# Patient Record
Sex: FEMALE | Race: WHITE | Employment: UNEMPLOYED | URBAN - METROPOLITAN AREA
[De-identification: names, ages, dates, MRNs, and addresses within clinical notes are randomized per-mention and may not be internally consistent; named-entity substitution may affect disease eponyms.]

---

## 2019-01-02 ENCOUNTER — HOSPITAL ENCOUNTER (INPATIENT)
Age: 84
LOS: 3 days | Discharge: HOME | DRG: 194 | End: 2019-01-05
Attending: EMERGENCY MEDICINE | Admitting: INTERNAL MEDICINE
Payer: MEDICARE

## 2019-01-02 ENCOUNTER — APPOINTMENT (OUTPATIENT)
Dept: GENERAL RADIOLOGY | Age: 84
DRG: 194 | End: 2019-01-02
Payer: MEDICARE

## 2019-01-02 DIAGNOSIS — R06.89 DYSPNEA AND RESPIRATORY ABNORMALITIES: ICD-10-CM

## 2019-01-02 DIAGNOSIS — J11.1 INFLUENZA WITH RESPIRATORY MANIFESTATION OTHER THAN PNEUMONIA: ICD-10-CM

## 2019-01-02 DIAGNOSIS — R06.00 DYSPNEA AND RESPIRATORY ABNORMALITIES: ICD-10-CM

## 2019-01-02 DIAGNOSIS — J18.9 PNEUMONIA OF BOTH LUNGS DUE TO INFECTIOUS ORGANISM, UNSPECIFIED PART OF LUNG: Primary | ICD-10-CM

## 2019-01-02 DIAGNOSIS — R00.0 TACHYCARDIA: ICD-10-CM

## 2019-01-02 DIAGNOSIS — D72.828 OTHER ELEVATED WHITE BLOOD CELL (WBC) COUNT: ICD-10-CM

## 2019-01-02 LAB
ALBUMIN SERPL-MCNC: 3.6 G/DL (ref 3.9–4.9)
ALP BLD-CCNC: 78 U/L (ref 40–130)
ALT SERPL-CCNC: 15 U/L (ref 0–33)
ANION GAP SERPL CALCULATED.3IONS-SCNC: 19 MEQ/L (ref 7–13)
AST SERPL-CCNC: 19 U/L (ref 0–35)
BILIRUB SERPL-MCNC: 0.5 MG/DL (ref 0–1.2)
BUN BLDV-MCNC: 24 MG/DL (ref 8–23)
CALCIUM SERPL-MCNC: 9.5 MG/DL (ref 8.6–10.2)
CHLORIDE BLD-SCNC: 98 MEQ/L (ref 98–107)
CO2: 24 MEQ/L (ref 22–29)
CREAT SERPL-MCNC: 0.67 MG/DL (ref 0.5–0.9)
EKG ATRIAL RATE: 111 BPM
EKG P AXIS: 19 DEGREES
EKG P-R INTERVAL: 100 MS
EKG Q-T INTERVAL: 340 MS
EKG QRS DURATION: 84 MS
EKG QTC CALCULATION (BAZETT): 462 MS
EKG R AXIS: 17 DEGREES
EKG T AXIS: 71 DEGREES
EKG VENTRICULAR RATE: 111 BPM
GFR AFRICAN AMERICAN: >60
GFR NON-AFRICAN AMERICAN: >60
GLOBULIN: 3.4 G/DL (ref 2.3–3.5)
GLUCOSE BLD-MCNC: 114 MG/DL (ref 74–109)
HCT VFR BLD CALC: 40 % (ref 37–47)
HEMOGLOBIN: 13.3 G/DL (ref 12–16)
LACTIC ACID: 1.7 MMOL/L (ref 0.5–2.2)
MCH RBC QN AUTO: 32.1 PG (ref 27–31.3)
MCHC RBC AUTO-ENTMCNC: 33.3 % (ref 33–37)
MCV RBC AUTO: 96.4 FL (ref 82–100)
PDW BLD-RTO: 13.5 % (ref 11.5–14.5)
PLATELET # BLD: 187 K/UL (ref 130–400)
POTASSIUM SERPL-SCNC: 4.1 MEQ/L (ref 3.5–5.1)
PRO-BNP: 1221 PG/ML
PROCALCITONIN: 1.89 NG/ML (ref 0–0.15)
RAPID INFLUENZA  B AGN: NEGATIVE
RAPID INFLUENZA A AGN: POSITIVE
RBC # BLD: 4.15 M/UL (ref 4.2–5.4)
SODIUM BLD-SCNC: 141 MEQ/L (ref 132–144)
TOTAL PROTEIN: 7 G/DL (ref 6.4–8.1)
WBC # BLD: 16.7 K/UL (ref 4.8–10.8)

## 2019-01-02 PROCEDURE — 6360000002 HC RX W HCPCS: Performed by: EMERGENCY MEDICINE

## 2019-01-02 PROCEDURE — 1210000000 HC MED SURG R&B

## 2019-01-02 PROCEDURE — 2580000003 HC RX 258: Performed by: PHYSICIAN ASSISTANT

## 2019-01-02 PROCEDURE — 71046 X-RAY EXAM CHEST 2 VIEWS: CPT

## 2019-01-02 PROCEDURE — 36415 COLL VENOUS BLD VENIPUNCTURE: CPT

## 2019-01-02 PROCEDURE — 83880 ASSAY OF NATRIURETIC PEPTIDE: CPT

## 2019-01-02 PROCEDURE — 94664 DEMO&/EVAL PT USE INHALER: CPT

## 2019-01-02 PROCEDURE — 80053 COMPREHEN METABOLIC PANEL: CPT

## 2019-01-02 PROCEDURE — 94640 AIRWAY INHALATION TREATMENT: CPT

## 2019-01-02 PROCEDURE — 6370000000 HC RX 637 (ALT 250 FOR IP): Performed by: EMERGENCY MEDICINE

## 2019-01-02 PROCEDURE — 2580000003 HC RX 258: Performed by: EMERGENCY MEDICINE

## 2019-01-02 PROCEDURE — 83605 ASSAY OF LACTIC ACID: CPT

## 2019-01-02 PROCEDURE — 96367 TX/PROPH/DG ADDL SEQ IV INF: CPT

## 2019-01-02 PROCEDURE — 96375 TX/PRO/DX INJ NEW DRUG ADDON: CPT

## 2019-01-02 PROCEDURE — 6370000000 HC RX 637 (ALT 250 FOR IP): Performed by: PHYSICIAN ASSISTANT

## 2019-01-02 PROCEDURE — 96365 THER/PROPH/DIAG IV INF INIT: CPT

## 2019-01-02 PROCEDURE — 87040 BLOOD CULTURE FOR BACTERIA: CPT

## 2019-01-02 PROCEDURE — 93005 ELECTROCARDIOGRAM TRACING: CPT

## 2019-01-02 PROCEDURE — 94760 N-INVAS EAR/PLS OXIMETRY 1: CPT

## 2019-01-02 PROCEDURE — 85027 COMPLETE CBC AUTOMATED: CPT

## 2019-01-02 PROCEDURE — 87804 INFLUENZA ASSAY W/OPTIC: CPT

## 2019-01-02 PROCEDURE — 84145 PROCALCITONIN (PCT): CPT

## 2019-01-02 PROCEDURE — 99285 EMERGENCY DEPT VISIT HI MDM: CPT

## 2019-01-02 PROCEDURE — 6360000002 HC RX W HCPCS: Performed by: PHYSICIAN ASSISTANT

## 2019-01-02 RX ORDER — ALBUTEROL SULFATE 2.5 MG/3ML
2.5 SOLUTION RESPIRATORY (INHALATION)
Status: DISCONTINUED | OUTPATIENT
Start: 2019-01-02 | End: 2019-01-05 | Stop reason: HOSPADM

## 2019-01-02 RX ORDER — METHYLPREDNISOLONE SODIUM SUCCINATE 40 MG/ML
40 INJECTION, POWDER, LYOPHILIZED, FOR SOLUTION INTRAMUSCULAR; INTRAVENOUS EVERY 6 HOURS
Status: DISCONTINUED | OUTPATIENT
Start: 2019-01-02 | End: 2019-01-03

## 2019-01-02 RX ORDER — OSELTAMIVIR PHOSPHATE 75 MG/1
75 CAPSULE ORAL 2 TIMES DAILY
Status: DISCONTINUED | OUTPATIENT
Start: 2019-01-02 | End: 2019-01-05 | Stop reason: HOSPADM

## 2019-01-02 RX ORDER — METHYLPREDNISOLONE SODIUM SUCCINATE 125 MG/2ML
125 INJECTION, POWDER, LYOPHILIZED, FOR SOLUTION INTRAMUSCULAR; INTRAVENOUS ONCE
Status: COMPLETED | OUTPATIENT
Start: 2019-01-02 | End: 2019-01-02

## 2019-01-02 RX ORDER — OSELTAMIVIR PHOSPHATE 75 MG/1
75 CAPSULE ORAL ONCE
Status: COMPLETED | OUTPATIENT
Start: 2019-01-02 | End: 2019-01-02

## 2019-01-02 RX ORDER — SODIUM CHLORIDE 0.9 % (FLUSH) 0.9 %
10 SYRINGE (ML) INJECTION EVERY 12 HOURS SCHEDULED
Status: DISCONTINUED | OUTPATIENT
Start: 2019-01-02 | End: 2019-01-05 | Stop reason: HOSPADM

## 2019-01-02 RX ORDER — ONDANSETRON 2 MG/ML
4 INJECTION INTRAMUSCULAR; INTRAVENOUS EVERY 6 HOURS PRN
Status: DISCONTINUED | OUTPATIENT
Start: 2019-01-02 | End: 2019-01-05 | Stop reason: HOSPADM

## 2019-01-02 RX ORDER — IPRATROPIUM BROMIDE AND ALBUTEROL SULFATE 2.5; .5 MG/3ML; MG/3ML
1 SOLUTION RESPIRATORY (INHALATION) 4 TIMES DAILY
Status: DISCONTINUED | OUTPATIENT
Start: 2019-01-03 | End: 2019-01-05

## 2019-01-02 RX ORDER — IPRATROPIUM BROMIDE AND ALBUTEROL SULFATE 2.5; .5 MG/3ML; MG/3ML
1 SOLUTION RESPIRATORY (INHALATION)
Status: DISCONTINUED | OUTPATIENT
Start: 2019-01-02 | End: 2019-01-02

## 2019-01-02 RX ORDER — SODIUM CHLORIDE 9 MG/ML
INJECTION, SOLUTION INTRAVENOUS CONTINUOUS
Status: DISCONTINUED | OUTPATIENT
Start: 2019-01-02 | End: 2019-01-05 | Stop reason: HOSPADM

## 2019-01-02 RX ORDER — ALBUTEROL SULFATE 2.5 MG/3ML
2.5 SOLUTION RESPIRATORY (INHALATION) EVERY 4 HOURS PRN
COMMUNITY

## 2019-01-02 RX ORDER — ALBUTEROL SULFATE 2.5 MG/3ML
2.5 SOLUTION RESPIRATORY (INHALATION) EVERY 6 HOURS PRN
Status: DISCONTINUED | OUTPATIENT
Start: 2019-01-02 | End: 2019-01-02

## 2019-01-02 RX ORDER — SODIUM CHLORIDE 0.9 % (FLUSH) 0.9 %
10 SYRINGE (ML) INJECTION PRN
Status: DISCONTINUED | OUTPATIENT
Start: 2019-01-02 | End: 2019-01-05 | Stop reason: HOSPADM

## 2019-01-02 RX ADMIN — ENOXAPARIN SODIUM 40 MG: 40 INJECTION SUBCUTANEOUS at 19:50

## 2019-01-02 RX ADMIN — CEFTRIAXONE SODIUM 1 G: 1 INJECTION, POWDER, FOR SOLUTION INTRAMUSCULAR; INTRAVENOUS at 14:34

## 2019-01-02 RX ADMIN — SODIUM CHLORIDE: 9 INJECTION, SOLUTION INTRAVENOUS at 19:49

## 2019-01-02 RX ADMIN — OSELTAMIVIR PHOSPHATE 75 MG: 75 CAPSULE ORAL at 16:13

## 2019-01-02 RX ADMIN — METHYLPREDNISOLONE SODIUM SUCCINATE 40 MG: 40 INJECTION, POWDER, FOR SOLUTION INTRAMUSCULAR; INTRAVENOUS at 19:49

## 2019-01-02 RX ADMIN — AZITHROMYCIN MONOHYDRATE 500 MG: 500 INJECTION, POWDER, LYOPHILIZED, FOR SOLUTION INTRAVENOUS at 15:23

## 2019-01-02 RX ADMIN — OSELTAMIVIR PHOSPHATE 75 MG: 75 CAPSULE ORAL at 21:10

## 2019-01-02 RX ADMIN — IPRATROPIUM BROMIDE AND ALBUTEROL SULFATE 1 AMPULE: .5; 3 SOLUTION RESPIRATORY (INHALATION) at 18:03

## 2019-01-02 RX ADMIN — Medication 10 ML: at 19:50

## 2019-01-02 RX ADMIN — IPRATROPIUM BROMIDE AND ALBUTEROL SULFATE 1 AMPULE: .5; 3 SOLUTION RESPIRATORY (INHALATION) at 14:03

## 2019-01-02 RX ADMIN — METHYLPREDNISOLONE SODIUM SUCCINATE 125 MG: 125 INJECTION, POWDER, FOR SOLUTION INTRAMUSCULAR; INTRAVENOUS at 14:34

## 2019-01-02 ASSESSMENT — ENCOUNTER SYMPTOMS
RHINORRHEA: 0
CHEST TIGHTNESS: 1
ABDOMINAL DISTENTION: 0
WHEEZING: 1
FACIAL SWELLING: 0
SHORTNESS OF BREATH: 1
PHOTOPHOBIA: 0
COLOR CHANGE: 0
COUGH: 1
VOMITING: 0
ABDOMINAL PAIN: 0
EYE DISCHARGE: 0

## 2019-01-02 ASSESSMENT — PAIN SCALES - GENERAL: PAINLEVEL_OUTOF10: 6

## 2019-01-02 ASSESSMENT — PAIN DESCRIPTION - LOCATION: LOCATION: CHEST

## 2019-01-03 ENCOUNTER — APPOINTMENT (OUTPATIENT)
Dept: ULTRASOUND IMAGING | Age: 84
DRG: 194 | End: 2019-01-03
Payer: MEDICARE

## 2019-01-03 LAB
ANION GAP SERPL CALCULATED.3IONS-SCNC: 12 MEQ/L (ref 7–13)
BASOPHILS ABSOLUTE: 0 K/UL (ref 0–0.2)
BASOPHILS RELATIVE PERCENT: 0.1 %
BUN BLDV-MCNC: 21 MG/DL (ref 8–23)
CALCIUM SERPL-MCNC: 9 MG/DL (ref 8.6–10.2)
CHLORIDE BLD-SCNC: 102 MEQ/L (ref 98–107)
CO2: 28 MEQ/L (ref 22–29)
CREAT SERPL-MCNC: 0.47 MG/DL (ref 0.5–0.9)
D DIMER: 1.66 MG/L FEU (ref 0–0.5)
EOSINOPHILS ABSOLUTE: 0 K/UL (ref 0–0.7)
EOSINOPHILS RELATIVE PERCENT: 0 %
GFR AFRICAN AMERICAN: >60
GFR NON-AFRICAN AMERICAN: >60
GLUCOSE BLD-MCNC: 132 MG/DL (ref 74–109)
HCT VFR BLD CALC: 36.9 % (ref 37–47)
HEMOGLOBIN: 12 G/DL (ref 12–16)
LYMPHOCYTES ABSOLUTE: 0.2 K/UL (ref 1–4.8)
LYMPHOCYTES RELATIVE PERCENT: 1.7 %
MCH RBC QN AUTO: 31.5 PG (ref 27–31.3)
MCHC RBC AUTO-ENTMCNC: 32.7 % (ref 33–37)
MCV RBC AUTO: 96.5 FL (ref 82–100)
MONOCYTES ABSOLUTE: 0.3 K/UL (ref 0.2–0.8)
MONOCYTES RELATIVE PERCENT: 2.2 %
NEUTROPHILS ABSOLUTE: 12.2 K/UL (ref 1.4–6.5)
NEUTROPHILS RELATIVE PERCENT: 96 %
PDW BLD-RTO: 13.4 % (ref 11.5–14.5)
PLATELET # BLD: 201 K/UL (ref 130–400)
POTASSIUM REFLEX MAGNESIUM: 3.7 MEQ/L (ref 3.5–5.1)
RBC # BLD: 3.82 M/UL (ref 4.2–5.4)
SODIUM BLD-SCNC: 142 MEQ/L (ref 132–144)
WBC # BLD: 12.6 K/UL (ref 4.8–10.8)

## 2019-01-03 PROCEDURE — 94640 AIRWAY INHALATION TREATMENT: CPT

## 2019-01-03 PROCEDURE — 85379 FIBRIN DEGRADATION QUANT: CPT

## 2019-01-03 PROCEDURE — 36415 COLL VENOUS BLD VENIPUNCTURE: CPT

## 2019-01-03 PROCEDURE — 2700000000 HC OXYGEN THERAPY PER DAY

## 2019-01-03 PROCEDURE — G8979 MOBILITY GOAL STATUS: HCPCS

## 2019-01-03 PROCEDURE — 80048 BASIC METABOLIC PNL TOTAL CA: CPT

## 2019-01-03 PROCEDURE — G8987 SELF CARE CURRENT STATUS: HCPCS

## 2019-01-03 PROCEDURE — G8978 MOBILITY CURRENT STATUS: HCPCS

## 2019-01-03 PROCEDURE — 6360000002 HC RX W HCPCS: Performed by: INTERNAL MEDICINE

## 2019-01-03 PROCEDURE — 6360000002 HC RX W HCPCS: Performed by: PHYSICIAN ASSISTANT

## 2019-01-03 PROCEDURE — 6370000000 HC RX 637 (ALT 250 FOR IP): Performed by: PHYSICIAN ASSISTANT

## 2019-01-03 PROCEDURE — 97166 OT EVAL MOD COMPLEX 45 MIN: CPT

## 2019-01-03 PROCEDURE — 93970 EXTREMITY STUDY: CPT

## 2019-01-03 PROCEDURE — G8988 SELF CARE GOAL STATUS: HCPCS

## 2019-01-03 PROCEDURE — 2580000003 HC RX 258: Performed by: PHYSICIAN ASSISTANT

## 2019-01-03 PROCEDURE — 6370000000 HC RX 637 (ALT 250 FOR IP): Performed by: INTERNAL MEDICINE

## 2019-01-03 PROCEDURE — 1210000000 HC MED SURG R&B

## 2019-01-03 PROCEDURE — 85025 COMPLETE CBC W/AUTO DIFF WBC: CPT

## 2019-01-03 PROCEDURE — 97162 PT EVAL MOD COMPLEX 30 MIN: CPT

## 2019-01-03 RX ORDER — METHYLPREDNISOLONE SODIUM SUCCINATE 40 MG/ML
40 INJECTION, POWDER, LYOPHILIZED, FOR SOLUTION INTRAMUSCULAR; INTRAVENOUS EVERY 8 HOURS
Status: DISCONTINUED | OUTPATIENT
Start: 2019-01-03 | End: 2019-01-05 | Stop reason: HOSPADM

## 2019-01-03 RX ORDER — ACETAMINOPHEN 325 MG/1
650 TABLET ORAL EVERY 4 HOURS PRN
Status: DISCONTINUED | OUTPATIENT
Start: 2019-01-03 | End: 2019-01-05 | Stop reason: HOSPADM

## 2019-01-03 RX ADMIN — IPRATROPIUM BROMIDE AND ALBUTEROL SULFATE 1 AMPULE: .5; 3 SOLUTION RESPIRATORY (INHALATION) at 11:16

## 2019-01-03 RX ADMIN — OSELTAMIVIR PHOSPHATE 75 MG: 75 CAPSULE ORAL at 20:36

## 2019-01-03 RX ADMIN — METHYLPREDNISOLONE SODIUM SUCCINATE 40 MG: 40 INJECTION, POWDER, FOR SOLUTION INTRAMUSCULAR; INTRAVENOUS at 19:38

## 2019-01-03 RX ADMIN — METHYLPREDNISOLONE SODIUM SUCCINATE 40 MG: 40 INJECTION, POWDER, FOR SOLUTION INTRAMUSCULAR; INTRAVENOUS at 02:49

## 2019-01-03 RX ADMIN — IPRATROPIUM BROMIDE AND ALBUTEROL SULFATE 1 AMPULE: .5; 3 SOLUTION RESPIRATORY (INHALATION) at 15:12

## 2019-01-03 RX ADMIN — AZITHROMYCIN MONOHYDRATE 500 MG: 500 INJECTION, POWDER, LYOPHILIZED, FOR SOLUTION INTRAVENOUS at 15:30

## 2019-01-03 RX ADMIN — IPRATROPIUM BROMIDE AND ALBUTEROL SULFATE 1 AMPULE: .5; 3 SOLUTION RESPIRATORY (INHALATION) at 20:59

## 2019-01-03 RX ADMIN — Medication 10 ML: at 19:38

## 2019-01-03 RX ADMIN — CEFTRIAXONE SODIUM 1 G: 1 INJECTION, POWDER, FOR SOLUTION INTRAMUSCULAR; INTRAVENOUS at 17:20

## 2019-01-03 RX ADMIN — SODIUM CHLORIDE: 9 INJECTION, SOLUTION INTRAVENOUS at 13:13

## 2019-01-03 RX ADMIN — ACETAMINOPHEN 650 MG: 325 TABLET ORAL at 15:33

## 2019-01-03 RX ADMIN — OSELTAMIVIR PHOSPHATE 75 MG: 75 CAPSULE ORAL at 10:57

## 2019-01-03 RX ADMIN — METHYLPREDNISOLONE SODIUM SUCCINATE 40 MG: 40 INJECTION, POWDER, FOR SOLUTION INTRAMUSCULAR; INTRAVENOUS at 10:57

## 2019-01-03 RX ADMIN — IPRATROPIUM BROMIDE AND ALBUTEROL SULFATE 1 AMPULE: .5; 3 SOLUTION RESPIRATORY (INHALATION) at 07:19

## 2019-01-03 RX ADMIN — ENOXAPARIN SODIUM 40 MG: 40 INJECTION SUBCUTANEOUS at 19:38

## 2019-01-03 ASSESSMENT — PAIN SCALES - GENERAL
PAINLEVEL_OUTOF10: 0
PAINLEVEL_OUTOF10: 8
PAINLEVEL_OUTOF10: 0

## 2019-01-04 LAB
ANION GAP SERPL CALCULATED.3IONS-SCNC: 12 MEQ/L (ref 7–13)
BASOPHILS ABSOLUTE: 0 K/UL (ref 0–0.2)
BASOPHILS RELATIVE PERCENT: 0.2 %
BUN BLDV-MCNC: 22 MG/DL (ref 8–23)
CALCIUM SERPL-MCNC: 8.6 MG/DL (ref 8.6–10.2)
CHLORIDE BLD-SCNC: 105 MEQ/L (ref 98–107)
CO2: 29 MEQ/L (ref 22–29)
CREAT SERPL-MCNC: 0.49 MG/DL (ref 0.5–0.9)
EOSINOPHILS ABSOLUTE: 0 K/UL (ref 0–0.7)
EOSINOPHILS RELATIVE PERCENT: 0 %
GFR AFRICAN AMERICAN: >60
GFR NON-AFRICAN AMERICAN: >60
GLUCOSE BLD-MCNC: 141 MG/DL (ref 74–109)
HCT VFR BLD CALC: 35.8 % (ref 37–47)
HEMOGLOBIN: 12.1 G/DL (ref 12–16)
LYMPHOCYTES ABSOLUTE: 0.3 K/UL (ref 1–4.8)
LYMPHOCYTES RELATIVE PERCENT: 3.4 %
MCH RBC QN AUTO: 32.7 PG (ref 27–31.3)
MCHC RBC AUTO-ENTMCNC: 33.8 % (ref 33–37)
MCV RBC AUTO: 96.7 FL (ref 82–100)
MONOCYTES ABSOLUTE: 0.4 K/UL (ref 0.2–0.8)
MONOCYTES RELATIVE PERCENT: 4 %
NEUTROPHILS ABSOLUTE: 8.9 K/UL (ref 1.4–6.5)
NEUTROPHILS RELATIVE PERCENT: 92.4 %
PDW BLD-RTO: 13.2 % (ref 11.5–14.5)
PLATELET # BLD: 240 K/UL (ref 130–400)
POTASSIUM REFLEX MAGNESIUM: 4.1 MEQ/L (ref 3.5–5.1)
RBC # BLD: 3.7 M/UL (ref 4.2–5.4)
SODIUM BLD-SCNC: 146 MEQ/L (ref 132–144)
WBC # BLD: 9.7 K/UL (ref 4.8–10.8)

## 2019-01-04 PROCEDURE — 85025 COMPLETE CBC W/AUTO DIFF WBC: CPT

## 2019-01-04 PROCEDURE — 97535 SELF CARE MNGMENT TRAINING: CPT

## 2019-01-04 PROCEDURE — 6370000000 HC RX 637 (ALT 250 FOR IP): Performed by: INTERNAL MEDICINE

## 2019-01-04 PROCEDURE — 36415 COLL VENOUS BLD VENIPUNCTURE: CPT

## 2019-01-04 PROCEDURE — 93010 ELECTROCARDIOGRAM REPORT: CPT | Performed by: INTERNAL MEDICINE

## 2019-01-04 PROCEDURE — 2700000000 HC OXYGEN THERAPY PER DAY

## 2019-01-04 PROCEDURE — 6370000000 HC RX 637 (ALT 250 FOR IP): Performed by: PHYSICIAN ASSISTANT

## 2019-01-04 PROCEDURE — 6360000002 HC RX W HCPCS: Performed by: PHYSICIAN ASSISTANT

## 2019-01-04 PROCEDURE — 94760 N-INVAS EAR/PLS OXIMETRY 1: CPT

## 2019-01-04 PROCEDURE — 6360000002 HC RX W HCPCS: Performed by: INTERNAL MEDICINE

## 2019-01-04 PROCEDURE — 1210000000 HC MED SURG R&B

## 2019-01-04 PROCEDURE — 94640 AIRWAY INHALATION TREATMENT: CPT

## 2019-01-04 PROCEDURE — 80048 BASIC METABOLIC PNL TOTAL CA: CPT

## 2019-01-04 PROCEDURE — 2580000003 HC RX 258: Performed by: PHYSICIAN ASSISTANT

## 2019-01-04 RX ORDER — BUDESONIDE AND FORMOTEROL FUMARATE DIHYDRATE 80; 4.5 UG/1; UG/1
2 AEROSOL RESPIRATORY (INHALATION) 2 TIMES DAILY
Status: DISCONTINUED | OUTPATIENT
Start: 2019-01-04 | End: 2019-01-04 | Stop reason: CLARIF

## 2019-01-04 RX ADMIN — Medication 10 ML: at 09:33

## 2019-01-04 RX ADMIN — METHYLPREDNISOLONE SODIUM SUCCINATE 40 MG: 40 INJECTION, POWDER, FOR SOLUTION INTRAMUSCULAR; INTRAVENOUS at 14:10

## 2019-01-04 RX ADMIN — OSELTAMIVIR PHOSPHATE 75 MG: 75 CAPSULE ORAL at 09:32

## 2019-01-04 RX ADMIN — OSELTAMIVIR PHOSPHATE 75 MG: 75 CAPSULE ORAL at 21:58

## 2019-01-04 RX ADMIN — Medication 2 PUFF: at 19:01

## 2019-01-04 RX ADMIN — SODIUM CHLORIDE: 9 INJECTION, SOLUTION INTRAVENOUS at 18:10

## 2019-01-04 RX ADMIN — AZITHROMYCIN MONOHYDRATE 500 MG: 500 INJECTION, POWDER, LYOPHILIZED, FOR SOLUTION INTRAVENOUS at 18:54

## 2019-01-04 RX ADMIN — ENOXAPARIN SODIUM 40 MG: 40 INJECTION SUBCUTANEOUS at 09:32

## 2019-01-04 RX ADMIN — CEFTRIAXONE SODIUM 1 G: 1 INJECTION, POWDER, FOR SOLUTION INTRAMUSCULAR; INTRAVENOUS at 18:09

## 2019-01-04 RX ADMIN — METHYLPREDNISOLONE SODIUM SUCCINATE 40 MG: 40 INJECTION, POWDER, FOR SOLUTION INTRAMUSCULAR; INTRAVENOUS at 18:10

## 2019-01-04 RX ADMIN — METHYLPREDNISOLONE SODIUM SUCCINATE 40 MG: 40 INJECTION, POWDER, FOR SOLUTION INTRAMUSCULAR; INTRAVENOUS at 02:56

## 2019-01-04 RX ADMIN — SODIUM CHLORIDE: 9 INJECTION, SOLUTION INTRAVENOUS at 02:56

## 2019-01-04 RX ADMIN — IPRATROPIUM BROMIDE AND ALBUTEROL SULFATE 1 AMPULE: .5; 3 SOLUTION RESPIRATORY (INHALATION) at 19:01

## 2019-01-04 RX ADMIN — IPRATROPIUM BROMIDE AND ALBUTEROL SULFATE 1 AMPULE: .5; 3 SOLUTION RESPIRATORY (INHALATION) at 07:21

## 2019-01-04 RX ADMIN — IPRATROPIUM BROMIDE AND ALBUTEROL SULFATE 1 AMPULE: .5; 3 SOLUTION RESPIRATORY (INHALATION) at 11:45

## 2019-01-04 ASSESSMENT — PAIN SCALES - GENERAL: PAINLEVEL_OUTOF10: 0

## 2019-01-05 VITALS
WEIGHT: 205.69 LBS | RESPIRATION RATE: 18 BRPM | TEMPERATURE: 97.9 F | DIASTOLIC BLOOD PRESSURE: 86 MMHG | BODY MASS INDEX: 34.27 KG/M2 | SYSTOLIC BLOOD PRESSURE: 156 MMHG | OXYGEN SATURATION: 96 % | HEART RATE: 107 BPM | HEIGHT: 65 IN

## 2019-01-05 LAB
ANION GAP SERPL CALCULATED.3IONS-SCNC: 8 MEQ/L (ref 7–13)
BASOPHILS ABSOLUTE: 0 K/UL (ref 0–0.2)
BASOPHILS RELATIVE PERCENT: 0.1 %
BUN BLDV-MCNC: 21 MG/DL (ref 8–23)
CALCIUM SERPL-MCNC: 8.5 MG/DL (ref 8.6–10.2)
CHLORIDE BLD-SCNC: 107 MEQ/L (ref 98–107)
CO2: 31 MEQ/L (ref 22–29)
CREAT SERPL-MCNC: 0.39 MG/DL (ref 0.5–0.9)
EOSINOPHILS ABSOLUTE: 0 K/UL (ref 0–0.7)
EOSINOPHILS RELATIVE PERCENT: 0.4 %
GFR AFRICAN AMERICAN: >60
GFR NON-AFRICAN AMERICAN: >60
GLUCOSE BLD-MCNC: 129 MG/DL (ref 74–109)
HCT VFR BLD CALC: 35.7 % (ref 37–47)
HEMOGLOBIN: 11.7 G/DL (ref 12–16)
LYMPHOCYTES ABSOLUTE: 0.3 K/UL (ref 1–4.8)
LYMPHOCYTES RELATIVE PERCENT: 4.3 %
MCH RBC QN AUTO: 31.7 PG (ref 27–31.3)
MCHC RBC AUTO-ENTMCNC: 32.7 % (ref 33–37)
MCV RBC AUTO: 96.8 FL (ref 82–100)
MONOCYTES ABSOLUTE: 0.5 K/UL (ref 0.2–0.8)
MONOCYTES RELATIVE PERCENT: 6.8 %
NEUTROPHILS ABSOLUTE: 5.8 K/UL (ref 1.4–6.5)
NEUTROPHILS RELATIVE PERCENT: 88.4 %
PDW BLD-RTO: 13.4 % (ref 11.5–14.5)
PLATELET # BLD: 242 K/UL (ref 130–400)
POTASSIUM REFLEX MAGNESIUM: 4.3 MEQ/L (ref 3.5–5.1)
RBC # BLD: 3.69 M/UL (ref 4.2–5.4)
SODIUM BLD-SCNC: 146 MEQ/L (ref 132–144)
WBC # BLD: 6.6 K/UL (ref 4.8–10.8)

## 2019-01-05 PROCEDURE — G0008 ADMIN INFLUENZA VIRUS VAC: HCPCS | Performed by: INTERNAL MEDICINE

## 2019-01-05 PROCEDURE — 2700000000 HC OXYGEN THERAPY PER DAY

## 2019-01-05 PROCEDURE — 36415 COLL VENOUS BLD VENIPUNCTURE: CPT

## 2019-01-05 PROCEDURE — 94640 AIRWAY INHALATION TREATMENT: CPT

## 2019-01-05 PROCEDURE — 6360000002 HC RX W HCPCS: Performed by: INTERNAL MEDICINE

## 2019-01-05 PROCEDURE — 85025 COMPLETE CBC W/AUTO DIFF WBC: CPT

## 2019-01-05 PROCEDURE — 90686 IIV4 VACC NO PRSV 0.5 ML IM: CPT | Performed by: INTERNAL MEDICINE

## 2019-01-05 PROCEDURE — 94664 DEMO&/EVAL PT USE INHALER: CPT

## 2019-01-05 PROCEDURE — 6370000000 HC RX 637 (ALT 250 FOR IP): Performed by: PHYSICIAN ASSISTANT

## 2019-01-05 PROCEDURE — 6360000002 HC RX W HCPCS: Performed by: PHYSICIAN ASSISTANT

## 2019-01-05 PROCEDURE — 90670 PCV13 VACCINE IM: CPT | Performed by: INTERNAL MEDICINE

## 2019-01-05 PROCEDURE — 2580000003 HC RX 258: Performed by: PHYSICIAN ASSISTANT

## 2019-01-05 PROCEDURE — 80048 BASIC METABOLIC PNL TOTAL CA: CPT

## 2019-01-05 PROCEDURE — G0009 ADMIN PNEUMOCOCCAL VACCINE: HCPCS | Performed by: INTERNAL MEDICINE

## 2019-01-05 PROCEDURE — 6370000000 HC RX 637 (ALT 250 FOR IP): Performed by: INTERNAL MEDICINE

## 2019-01-05 RX ORDER — IPRATROPIUM BROMIDE AND ALBUTEROL SULFATE 2.5; .5 MG/3ML; MG/3ML
1 SOLUTION RESPIRATORY (INHALATION) 3 TIMES DAILY
Status: DISCONTINUED | OUTPATIENT
Start: 2019-01-05 | End: 2019-01-05 | Stop reason: HOSPADM

## 2019-01-05 RX ORDER — PREDNISONE 20 MG/1
40 TABLET ORAL DAILY
Qty: 6 TABLET | Refills: 0 | Status: SHIPPED | OUTPATIENT
Start: 2019-01-05 | End: 2019-01-08

## 2019-01-05 RX ORDER — OSELTAMIVIR PHOSPHATE 75 MG/1
75 CAPSULE ORAL 2 TIMES DAILY
Qty: 4 CAPSULE | Refills: 0 | Status: SHIPPED | OUTPATIENT
Start: 2019-01-05 | End: 2019-01-07

## 2019-01-05 RX ORDER — LEVOFLOXACIN 500 MG/1
500 TABLET, FILM COATED ORAL DAILY
Qty: 7 TABLET | Refills: 0 | Status: SHIPPED | OUTPATIENT
Start: 2019-01-05 | End: 2019-01-12

## 2019-01-05 RX ADMIN — PNEUMOCOCCAL 13-VALENT CONJUGATE VACCINE 0.5 ML: 2.2; 2.2; 2.2; 2.2; 2.2; 4.4; 2.2; 2.2; 2.2; 2.2; 2.2; 2.2; 2.2 INJECTION, SUSPENSION INTRAMUSCULAR at 08:02

## 2019-01-05 RX ADMIN — INFLUENZA A VIRUS A/MICHIGAN/45/2015 X-275 (H1N1) ANTIGEN (FORMALDEHYDE INACTIVATED), INFLUENZA A VIRUS A/SINGAPORE/INFIMH-16-0019/2016 IVR-186 (H3N2) ANTIGEN (FORMALDEHYDE INACTIVATED), INFLUENZA B VIRUS B/PHUKET/3073/2013 ANTIGEN (FORMALDEHYDE INACTIVATED), AND INFLUENZA B VIRUS B/MARYLAND/15/2016 BX-69A ANTIGEN (FORMALDEHYDE INACTIVATED) 0.5 ML: 15; 15; 15; 15 INJECTION, SUSPENSION INTRAMUSCULAR at 08:00

## 2019-01-05 RX ADMIN — METHYLPREDNISOLONE SODIUM SUCCINATE 40 MG: 40 INJECTION, POWDER, FOR SOLUTION INTRAMUSCULAR; INTRAVENOUS at 03:10

## 2019-01-05 RX ADMIN — METHYLPREDNISOLONE SODIUM SUCCINATE 40 MG: 40 INJECTION, POWDER, FOR SOLUTION INTRAMUSCULAR; INTRAVENOUS at 10:28

## 2019-01-05 RX ADMIN — Medication 2 PUFF: at 08:33

## 2019-01-05 RX ADMIN — OSELTAMIVIR PHOSPHATE 75 MG: 75 CAPSULE ORAL at 07:59

## 2019-01-05 RX ADMIN — IPRATROPIUM BROMIDE AND ALBUTEROL SULFATE 1 AMPULE: .5; 3 SOLUTION RESPIRATORY (INHALATION) at 08:33

## 2019-01-05 RX ADMIN — Medication 10 ML: at 08:06

## 2019-01-05 RX ADMIN — ENOXAPARIN SODIUM 40 MG: 40 INJECTION SUBCUTANEOUS at 08:00

## 2019-01-07 LAB
BLOOD CULTURE, ROUTINE: NORMAL
CULTURE, BLOOD 2: NORMAL

## 2019-07-31 ENCOUNTER — APPOINTMENT (OUTPATIENT)
Dept: CT IMAGING | Age: 84
End: 2019-07-31
Payer: MEDICARE

## 2019-07-31 ENCOUNTER — APPOINTMENT (OUTPATIENT)
Dept: GENERAL RADIOLOGY | Age: 84
End: 2019-07-31
Payer: MEDICARE

## 2019-07-31 ENCOUNTER — HOSPITAL ENCOUNTER (EMERGENCY)
Age: 84
Discharge: HOME | End: 2019-07-31
Attending: EMERGENCY MEDICINE
Payer: MEDICARE

## 2019-07-31 VITALS
DIASTOLIC BLOOD PRESSURE: 55 MMHG | WEIGHT: 206 LBS | SYSTOLIC BLOOD PRESSURE: 134 MMHG | RESPIRATION RATE: 18 BRPM | TEMPERATURE: 98 F | HEIGHT: 65 IN | HEART RATE: 99 BPM | BODY MASS INDEX: 34.32 KG/M2 | OXYGEN SATURATION: 98 %

## 2019-07-31 DIAGNOSIS — S32.000A LUMBAR COMPRESSION FRACTURE, CLOSED, INITIAL ENCOUNTER (HCC): Primary | ICD-10-CM

## 2019-07-31 LAB
ALBUMIN SERPL-MCNC: 4 G/DL (ref 3.5–4.6)
ALP BLD-CCNC: 63 U/L (ref 40–130)
ALT SERPL-CCNC: 13 U/L (ref 0–33)
ANION GAP SERPL CALCULATED.3IONS-SCNC: 14 MEQ/L (ref 9–15)
AST SERPL-CCNC: 22 U/L (ref 0–35)
BASOPHILS ABSOLUTE: 0 K/UL (ref 0–0.2)
BASOPHILS RELATIVE PERCENT: 0.2 %
BILIRUB SERPL-MCNC: 0.6 MG/DL (ref 0.2–0.7)
BUN BLDV-MCNC: 14 MG/DL (ref 8–23)
CALCIUM SERPL-MCNC: 9.5 MG/DL (ref 8.5–9.9)
CHLORIDE BLD-SCNC: 106 MEQ/L (ref 95–107)
CO2: 27 MEQ/L (ref 20–31)
CREAT SERPL-MCNC: 0.73 MG/DL (ref 0.5–0.9)
EKG ATRIAL RATE: 115 BPM
EKG P AXIS: 55 DEGREES
EKG P-R INTERVAL: 124 MS
EKG Q-T INTERVAL: 310 MS
EKG QRS DURATION: 72 MS
EKG QTC CALCULATION (BAZETT): 428 MS
EKG R AXIS: 27 DEGREES
EKG T AXIS: 59 DEGREES
EKG VENTRICULAR RATE: 115 BPM
EOSINOPHILS ABSOLUTE: 0.1 K/UL (ref 0–0.7)
EOSINOPHILS RELATIVE PERCENT: 1.2 %
GFR AFRICAN AMERICAN: >60
GFR NON-AFRICAN AMERICAN: >60
GLOBULIN: 3.1 G/DL (ref 2.3–3.5)
GLUCOSE BLD-MCNC: 119 MG/DL (ref 70–99)
HCT VFR BLD CALC: 39.5 % (ref 37–47)
HEMOGLOBIN: 13.4 G/DL (ref 12–16)
INR BLD: 1
LYMPHOCYTES ABSOLUTE: 0.4 K/UL (ref 1–4.8)
LYMPHOCYTES RELATIVE PERCENT: 6.2 %
MCH RBC QN AUTO: 34 PG (ref 27–31.3)
MCHC RBC AUTO-ENTMCNC: 33.8 % (ref 33–37)
MCV RBC AUTO: 100.4 FL (ref 82–100)
MONOCYTES ABSOLUTE: 0.7 K/UL (ref 0.2–0.8)
MONOCYTES RELATIVE PERCENT: 11.5 %
NEUTROPHILS ABSOLUTE: 5.1 K/UL (ref 1.4–6.5)
NEUTROPHILS RELATIVE PERCENT: 80.9 %
PDW BLD-RTO: 14.5 % (ref 11.5–14.5)
PLATELET # BLD: 178 K/UL (ref 130–400)
POTASSIUM SERPL-SCNC: 4 MEQ/L (ref 3.4–4.9)
PROTHROMBIN TIME: 13.5 SEC (ref 12.3–14.9)
RBC # BLD: 3.94 M/UL (ref 4.2–5.4)
SODIUM BLD-SCNC: 147 MEQ/L (ref 135–144)
TOTAL PROTEIN: 7.1 G/DL (ref 6.3–8)
TROPONIN: <0.01 NG/ML (ref 0–0.01)
WBC # BLD: 6.3 K/UL (ref 4.8–10.8)

## 2019-07-31 PROCEDURE — 6360000002 HC RX W HCPCS: Performed by: EMERGENCY MEDICINE

## 2019-07-31 PROCEDURE — 73030 X-RAY EXAM OF SHOULDER: CPT

## 2019-07-31 PROCEDURE — 93005 ELECTROCARDIOGRAM TRACING: CPT | Performed by: EMERGENCY MEDICINE

## 2019-07-31 PROCEDURE — 6370000000 HC RX 637 (ALT 250 FOR IP): Performed by: EMERGENCY MEDICINE

## 2019-07-31 PROCEDURE — 72072 X-RAY EXAM THORAC SPINE 3VWS: CPT

## 2019-07-31 PROCEDURE — 99284 EMERGENCY DEPT VISIT MOD MDM: CPT

## 2019-07-31 PROCEDURE — 36415 COLL VENOUS BLD VENIPUNCTURE: CPT

## 2019-07-31 PROCEDURE — 72128 CT CHEST SPINE W/O DYE: CPT

## 2019-07-31 PROCEDURE — 85025 COMPLETE CBC W/AUTO DIFF WBC: CPT

## 2019-07-31 PROCEDURE — 72110 X-RAY EXAM L-2 SPINE 4/>VWS: CPT

## 2019-07-31 PROCEDURE — 96372 THER/PROPH/DIAG INJ SC/IM: CPT

## 2019-07-31 PROCEDURE — 71046 X-RAY EXAM CHEST 2 VIEWS: CPT

## 2019-07-31 PROCEDURE — 85610 PROTHROMBIN TIME: CPT

## 2019-07-31 PROCEDURE — 84484 ASSAY OF TROPONIN QUANT: CPT

## 2019-07-31 PROCEDURE — 80053 COMPREHEN METABOLIC PANEL: CPT

## 2019-07-31 RX ORDER — ONDANSETRON 4 MG/1
4 TABLET, ORALLY DISINTEGRATING ORAL ONCE
Status: COMPLETED | OUTPATIENT
Start: 2019-07-31 | End: 2019-07-31

## 2019-07-31 RX ORDER — ONDANSETRON 2 MG/ML
4 INJECTION INTRAMUSCULAR; INTRAVENOUS ONCE
Status: DISCONTINUED | OUTPATIENT
Start: 2019-07-31 | End: 2019-07-31

## 2019-07-31 RX ORDER — MORPHINE SULFATE 2 MG/ML
4 INJECTION, SOLUTION INTRAMUSCULAR; INTRAVENOUS ONCE
Status: DISCONTINUED | OUTPATIENT
Start: 2019-07-31 | End: 2019-07-31

## 2019-07-31 RX ORDER — HYDROCODONE BITARTRATE AND ACETAMINOPHEN 5; 325 MG/1; MG/1
1-2 TABLET ORAL EVERY 4 HOURS PRN
Qty: 15 TABLET | Refills: 0 | Status: SHIPPED | OUTPATIENT
Start: 2019-07-31 | End: 2019-08-05

## 2019-07-31 RX ORDER — MORPHINE SULFATE 2 MG/ML
4 INJECTION, SOLUTION INTRAMUSCULAR; INTRAVENOUS ONCE
Status: COMPLETED | OUTPATIENT
Start: 2019-07-31 | End: 2019-07-31

## 2019-07-31 RX ADMIN — MORPHINE SULFATE 4 MG: 2 INJECTION, SOLUTION INTRAMUSCULAR; INTRAVENOUS at 14:37

## 2019-07-31 RX ADMIN — ONDANSETRON 4 MG: 4 TABLET, ORALLY DISINTEGRATING ORAL at 14:35

## 2019-07-31 ASSESSMENT — PAIN DESCRIPTION - LOCATION: LOCATION: BACK

## 2019-07-31 ASSESSMENT — PAIN DESCRIPTION - DESCRIPTORS: DESCRIPTORS: ACHING

## 2019-07-31 ASSESSMENT — PAIN DESCRIPTION - ONSET: ONSET: SUDDEN

## 2019-07-31 ASSESSMENT — PAIN DESCRIPTION - FREQUENCY: FREQUENCY: CONTINUOUS

## 2019-07-31 ASSESSMENT — ENCOUNTER SYMPTOMS
VOMITING: 0
NAUSEA: 0
SHORTNESS OF BREATH: 0
CHEST TIGHTNESS: 0
SORE THROAT: 0
EYE PAIN: 0
BACK PAIN: 1
ABDOMINAL PAIN: 0

## 2019-07-31 ASSESSMENT — PAIN DESCRIPTION - ORIENTATION: ORIENTATION: RIGHT;LEFT;LOWER;MID

## 2019-07-31 ASSESSMENT — PAIN DESCRIPTION - PAIN TYPE: TYPE: ACUTE PAIN

## 2019-07-31 ASSESSMENT — PAIN SCALES - GENERAL: PAINLEVEL_OUTOF10: 9

## 2019-07-31 NOTE — ED PROVIDER NOTES
Psychiatric/Behavioral: Negative for confusion and sleep disturbance. Except as noted above the remainder of the review of systems was reviewed and negative. PAST MEDICAL HISTORY     Past Medical History:   Diagnosis Date    COPD (chronic obstructive pulmonary disease) ([facility] [address]          SURGICALHISTORY     No past surgical history on file. CURRENT MEDICATIONS       Previous Medications    ALBUTEROL (PROVENTIL) (2.5 MG/3ML) 0.083% NEBULIZER SOLUTION    Take 2.5 mg by nebulization every 6 hours as needed for Wheezing       ALLERGIES     Patient has no known allergies. FAMILY HISTORY     No family history on file. SOCIAL HISTORY       Social History     Socioeconomic History    Marital status:       Spouse name: Not on file    Number of children: Not on file    Years of education: Not on file    Highest education level: Not on file   Occupational History    Not on file   Social Needs    Financial resource strain: Not on file    Food insecurity:     Worry: Not on file     Inability: Not on file    Transportation needs:     Medical: Not on file     Non-medical: Not on file   Tobacco Use    Smoking status: Former Smoker    Smokeless tobacco: Never Used   Substance and Sexual Activity    Alcohol use: No    Drug use: No    Sexual activity: Not on file   Lifestyle    Physical activity:     Days per week: Not on file     Minutes per session: Not on file    Stress: Not on file   Relationships    Social connections:     Talks on phone: Not on file     Gets together: Not on file     Attends Jainism service: Not on file     Active member of club or organization: Not on file     Attends meetings of clubs or organizations: Not on file     Relationship status: Not on file    Intimate partner violence:     Fear of current or ex partner: Not on file     Emotionally abused: Not on file     Physically abused: Not on file     Forced sexual activity: Not on file   Other Topics Concern    Not on file   Social History Narrative    Not on file       SCREENINGS              PHYSICAL EXAM    (up to 7 for level 4, 8 or more for level 5)     ED Triage Vitals [07/31/19 1212]   BP Temp Temp Source Pulse Resp SpO2 Height Weight   (!) 142/83 98 °F (36.7 °C) Oral 115 20 92 % 5' 5\" (1.651 m) 206 lb (93.4 kg)       Physical Exam   Constitutional: She is oriented to person, place, and time. She appears well-developed and well-nourished. No distress. HENT:   Head: Normocephalic and atraumatic. Right Ear: External ear normal.   Left Ear: External ear normal.   Mouth/Throat: No oropharyngeal exudate. Eyes: Pupils are equal, round, and reactive to light. Conjunctivae are normal.   Neck: Normal range of motion. Neck supple. No JVD present. No tracheal deviation present. No thyromegaly present. Cardiovascular: Normal rate and normal heart sounds. No murmur heard. Pulmonary/Chest: Effort normal and breath sounds normal. No respiratory distress. She has no wheezes. She exhibits tenderness. Right side chest wall tender to palpation without subcu emphysema or crepitance. Entire right breast is ecchymotic. Abdominal: Soft. Bowel sounds are normal. There is no tenderness. There is no guarding. Musculoskeletal: Normal range of motion. She exhibits no edema.  to palpation in the midline in the lower thoracic and lumbar region. No paraspinal musculature tenderness   Neurological: She is alert and oriented to person, place, and time. No cranial nerve deficit. Skin: Skin is warm and dry. No rash noted. She is not diaphoretic. Psychiatric: She has a normal mood and affect. Her behavior is normal.   Nursing note and vitals reviewed. DIAGNOSTIC RESULTS     EKG: All EKG's are interpreted by the Emergency Department Physician who either signs or Co-signsthis chart in the absence of a cardiologist.    Sinus tachycardia 115 bpm no acute ischemia.   Some nonspecific T wave changes    RADIOLOGY:

## 2019-08-01 PROCEDURE — 93010 ELECTROCARDIOGRAM REPORT: CPT | Performed by: INTERNAL MEDICINE

## 2020-02-04 ENCOUNTER — HOSPITAL ENCOUNTER (INPATIENT)
Age: 85
LOS: 6 days | Discharge: NURSING, REHAB OR LONG-TERM CARE | DRG: 189 | End: 2020-02-10
Attending: EMERGENCY MEDICINE | Admitting: INTERNAL MEDICINE
Payer: MEDICARE

## 2020-02-04 ENCOUNTER — APPOINTMENT (OUTPATIENT)
Dept: CT IMAGING | Age: 85
DRG: 189 | End: 2020-02-04
Payer: MEDICARE

## 2020-02-04 ENCOUNTER — APPOINTMENT (OUTPATIENT)
Dept: GENERAL RADIOLOGY | Age: 85
DRG: 189 | End: 2020-02-04
Payer: MEDICARE

## 2020-02-04 PROBLEM — J44.1 COPD WITH ACUTE EXACERBATION (HCC): Status: ACTIVE | Noted: 2020-02-04

## 2020-02-04 LAB
ALBUMIN SERPL-MCNC: 3.2 G/DL (ref 3.5–4.6)
ALP BLD-CCNC: 85 U/L (ref 40–130)
ALT SERPL-CCNC: 13 U/L (ref 0–33)
ANION GAP SERPL CALCULATED.3IONS-SCNC: 12 MEQ/L (ref 9–15)
AST SERPL-CCNC: 13 U/L (ref 0–35)
BASE EXCESS ARTERIAL: 9 (ref -3–3)
BASOPHILS ABSOLUTE: 0 K/UL (ref 0–0.2)
BASOPHILS RELATIVE PERCENT: 0.2 %
BILIRUB SERPL-MCNC: 0.3 MG/DL (ref 0.2–0.7)
BUN BLDV-MCNC: 18 MG/DL (ref 8–23)
CALCIUM IONIZED: 1.23 MMOL/L (ref 1.12–1.32)
CALCIUM SERPL-MCNC: 9.4 MG/DL (ref 8.5–9.9)
CHLORIDE BLD-SCNC: 99 MEQ/L (ref 95–107)
CO2: 33 MEQ/L (ref 20–31)
CREAT SERPL-MCNC: 0.43 MG/DL (ref 0.5–0.9)
EOSINOPHILS ABSOLUTE: 0 K/UL (ref 0–0.7)
EOSINOPHILS RELATIVE PERCENT: 0.1 %
GFR AFRICAN AMERICAN: >60
GFR AFRICAN AMERICAN: >60
GFR NON-AFRICAN AMERICAN: >60
GFR NON-AFRICAN AMERICAN: >60
GLOBULIN: 3.7 G/DL (ref 2.3–3.5)
GLUCOSE BLD-MCNC: 119 MG/DL (ref 70–99)
GLUCOSE BLD-MCNC: 178 MG/DL (ref 60–115)
HCO3 ARTERIAL: 34.4 MMOL/L (ref 21–29)
HCT VFR BLD CALC: 39.5 % (ref 37–47)
HEMOGLOBIN: 11.9 GM/DL (ref 12–16)
HEMOGLOBIN: 12.6 G/DL (ref 12–16)
LACTATE: 0.45 MMOL/L (ref 0.4–2)
LACTIC ACID: 0.9 MMOL/L (ref 0.5–2.2)
LYMPHOCYTES ABSOLUTE: 0.5 K/UL (ref 1–4.8)
LYMPHOCYTES RELATIVE PERCENT: 4 %
MCH RBC QN AUTO: 32.4 PG (ref 27–31.3)
MCHC RBC AUTO-ENTMCNC: 31.9 % (ref 33–37)
MCV RBC AUTO: 101.4 FL (ref 82–100)
MONOCYTES ABSOLUTE: 0.8 K/UL (ref 0.2–0.8)
MONOCYTES RELATIVE PERCENT: 6 %
NEUTROPHILS ABSOLUTE: 11.5 K/UL (ref 1.4–6.5)
NEUTROPHILS RELATIVE PERCENT: 89.7 %
O2 SAT, ARTERIAL: 94 % (ref 93–100)
PCO2 ARTERIAL: 57 MM HG (ref 35–45)
PDW BLD-RTO: 13.6 % (ref 11.5–14.5)
PERFORMED ON: ABNORMAL
PH ARTERIAL: 7.39 (ref 7.35–7.45)
PLATELET # BLD: 283 K/UL (ref 130–400)
PO2 ARTERIAL: 74 MM HG (ref 75–108)
POC CHLORIDE: 99 MEQ/L (ref 99–110)
POC CREATININE: 0.5 MG/DL (ref 0.6–1.2)
POC HEMATOCRIT: 35 % (ref 36–48)
POC POTASSIUM: 3.7 MEQ/L (ref 3.5–5.1)
POC SAMPLE TYPE: ABNORMAL
POC SODIUM: 140 MEQ/L (ref 136–145)
POTASSIUM SERPL-SCNC: 4 MEQ/L (ref 3.4–4.9)
PROCALCITONIN: 0.08 NG/ML (ref 0–0.15)
RBC # BLD: 3.9 M/UL (ref 4.2–5.4)
SODIUM BLD-SCNC: 144 MEQ/L (ref 135–144)
TCO2 ARTERIAL: 36 (ref 22–29)
TOTAL PROTEIN: 6.9 G/DL (ref 6.3–8)
TSH SERPL DL<=0.05 MIU/L-ACNC: 0.04 UIU/ML (ref 0.44–3.86)
WBC # BLD: 12.8 K/UL (ref 4.8–10.8)

## 2020-02-04 PROCEDURE — 71275 CT ANGIOGRAPHY CHEST: CPT

## 2020-02-04 PROCEDURE — 96374 THER/PROPH/DIAG INJ IV PUSH: CPT

## 2020-02-04 PROCEDURE — 6830039000 HC L3 TRAUMA ALERT

## 2020-02-04 PROCEDURE — 93005 ELECTROCARDIOGRAM TRACING: CPT | Performed by: INTERNAL MEDICINE

## 2020-02-04 PROCEDURE — 2580000003 HC RX 258: Performed by: NURSE PRACTITIONER

## 2020-02-04 PROCEDURE — 99285 EMERGENCY DEPT VISIT HI MDM: CPT

## 2020-02-04 PROCEDURE — 6360000002 HC RX W HCPCS: Performed by: NURSE PRACTITIONER

## 2020-02-04 PROCEDURE — 84145 PROCALCITONIN (PCT): CPT

## 2020-02-04 PROCEDURE — 80053 COMPREHEN METABOLIC PANEL: CPT

## 2020-02-04 PROCEDURE — 94640 AIRWAY INHALATION TREATMENT: CPT

## 2020-02-04 PROCEDURE — 82435 ASSAY OF BLOOD CHLORIDE: CPT

## 2020-02-04 PROCEDURE — 6370000000 HC RX 637 (ALT 250 FOR IP): Performed by: NURSE PRACTITIONER

## 2020-02-04 PROCEDURE — 6360000004 HC RX CONTRAST MEDICATION: Performed by: EMERGENCY MEDICINE

## 2020-02-04 PROCEDURE — 70450 CT HEAD/BRAIN W/O DYE: CPT

## 2020-02-04 PROCEDURE — 85014 HEMATOCRIT: CPT

## 2020-02-04 PROCEDURE — 83605 ASSAY OF LACTIC ACID: CPT

## 2020-02-04 PROCEDURE — 87040 BLOOD CULTURE FOR BACTERIA: CPT

## 2020-02-04 PROCEDURE — 99222 1ST HOSP IP/OBS MODERATE 55: CPT | Performed by: INTERNAL MEDICINE

## 2020-02-04 PROCEDURE — 84132 ASSAY OF SERUM POTASSIUM: CPT

## 2020-02-04 PROCEDURE — 82330 ASSAY OF CALCIUM: CPT

## 2020-02-04 PROCEDURE — 6360000002 HC RX W HCPCS: Performed by: EMERGENCY MEDICINE

## 2020-02-04 PROCEDURE — 2580000003 HC RX 258: Performed by: EMERGENCY MEDICINE

## 2020-02-04 PROCEDURE — 71045 X-RAY EXAM CHEST 1 VIEW: CPT

## 2020-02-04 PROCEDURE — 84295 ASSAY OF SERUM SODIUM: CPT

## 2020-02-04 PROCEDURE — 82803 BLOOD GASES ANY COMBINATION: CPT

## 2020-02-04 PROCEDURE — 36600 WITHDRAWAL OF ARTERIAL BLOOD: CPT

## 2020-02-04 PROCEDURE — 84443 ASSAY THYROID STIM HORMONE: CPT

## 2020-02-04 PROCEDURE — 82565 ASSAY OF CREATININE: CPT

## 2020-02-04 PROCEDURE — 6370000000 HC RX 637 (ALT 250 FOR IP): Performed by: EMERGENCY MEDICINE

## 2020-02-04 PROCEDURE — 1210000000 HC MED SURG R&B

## 2020-02-04 PROCEDURE — 85025 COMPLETE CBC W/AUTO DIFF WBC: CPT

## 2020-02-04 PROCEDURE — 94664 DEMO&/EVAL PT USE INHALER: CPT

## 2020-02-04 PROCEDURE — 36415 COLL VENOUS BLD VENIPUNCTURE: CPT

## 2020-02-04 PROCEDURE — 94761 N-INVAS EAR/PLS OXIMETRY MLT: CPT

## 2020-02-04 RX ORDER — SODIUM CHLORIDE 0.9 % (FLUSH) 0.9 %
10 SYRINGE (ML) INJECTION PRN
Status: DISCONTINUED | OUTPATIENT
Start: 2020-02-04 | End: 2020-02-10 | Stop reason: HOSPADM

## 2020-02-04 RX ORDER — PREDNISONE 20 MG/1
40 TABLET ORAL DAILY
Status: DISCONTINUED | OUTPATIENT
Start: 2020-02-07 | End: 2020-02-05

## 2020-02-04 RX ORDER — SODIUM CHLORIDE 0.9 % (FLUSH) 0.9 %
10 SYRINGE (ML) INJECTION EVERY 12 HOURS SCHEDULED
Status: DISCONTINUED | OUTPATIENT
Start: 2020-02-04 | End: 2020-02-10 | Stop reason: HOSPADM

## 2020-02-04 RX ORDER — ACETAMINOPHEN 325 MG/1
650 TABLET ORAL EVERY 4 HOURS PRN
Status: DISCONTINUED | OUTPATIENT
Start: 2020-02-04 | End: 2020-02-10 | Stop reason: HOSPADM

## 2020-02-04 RX ORDER — METHYLPREDNISOLONE SODIUM SUCCINATE 125 MG/2ML
125 INJECTION, POWDER, LYOPHILIZED, FOR SOLUTION INTRAMUSCULAR; INTRAVENOUS ONCE
Status: COMPLETED | OUTPATIENT
Start: 2020-02-04 | End: 2020-02-04

## 2020-02-04 RX ORDER — IPRATROPIUM BROMIDE AND ALBUTEROL SULFATE 2.5; .5 MG/3ML; MG/3ML
1 SOLUTION RESPIRATORY (INHALATION)
Status: DISCONTINUED | OUTPATIENT
Start: 2020-02-04 | End: 2020-02-04

## 2020-02-04 RX ORDER — ONDANSETRON 2 MG/ML
4 INJECTION INTRAMUSCULAR; INTRAVENOUS EVERY 6 HOURS PRN
Status: DISCONTINUED | OUTPATIENT
Start: 2020-02-04 | End: 2020-02-10 | Stop reason: HOSPADM

## 2020-02-04 RX ORDER — 0.9 % SODIUM CHLORIDE 0.9 %
500 INTRAVENOUS SOLUTION INTRAVENOUS ONCE
Status: COMPLETED | OUTPATIENT
Start: 2020-02-04 | End: 2020-02-04

## 2020-02-04 RX ORDER — ALBUTEROL SULFATE 2.5 MG/3ML
2.5 SOLUTION RESPIRATORY (INHALATION)
Status: DISCONTINUED | OUTPATIENT
Start: 2020-02-04 | End: 2020-02-10 | Stop reason: HOSPADM

## 2020-02-04 RX ORDER — METHYLPREDNISOLONE SODIUM SUCCINATE 40 MG/ML
40 INJECTION, POWDER, LYOPHILIZED, FOR SOLUTION INTRAMUSCULAR; INTRAVENOUS EVERY 8 HOURS
Status: DISCONTINUED | OUTPATIENT
Start: 2020-02-04 | End: 2020-02-05

## 2020-02-04 RX ORDER — IPRATROPIUM BROMIDE AND ALBUTEROL SULFATE 2.5; .5 MG/3ML; MG/3ML
1 SOLUTION RESPIRATORY (INHALATION) 4 TIMES DAILY
Status: DISCONTINUED | OUTPATIENT
Start: 2020-02-04 | End: 2020-02-07

## 2020-02-04 RX ORDER — IPRATROPIUM BROMIDE AND ALBUTEROL SULFATE 2.5; .5 MG/3ML; MG/3ML
1 SOLUTION RESPIRATORY (INHALATION) CONTINUOUS PRN
Status: DISCONTINUED | OUTPATIENT
Start: 2020-02-04 | End: 2020-02-04

## 2020-02-04 RX ADMIN — SODIUM CHLORIDE 500 ML: 9 INJECTION, SOLUTION INTRAVENOUS at 11:09

## 2020-02-04 RX ADMIN — METHYLPREDNISOLONE SODIUM SUCCINATE 125 MG: 125 INJECTION, POWDER, FOR SOLUTION INTRAMUSCULAR; INTRAVENOUS at 11:09

## 2020-02-04 RX ADMIN — IPRATROPIUM BROMIDE AND ALBUTEROL SULFATE 1 AMPULE: .5; 3 SOLUTION RESPIRATORY (INHALATION) at 11:02

## 2020-02-04 RX ADMIN — ALBUTEROL SULFATE 2.5 MG: 2.5 SOLUTION RESPIRATORY (INHALATION) at 19:37

## 2020-02-04 RX ADMIN — IOPAMIDOL 100 ML: 755 INJECTION, SOLUTION INTRAVENOUS at 13:04

## 2020-02-04 RX ADMIN — IPRATROPIUM BROMIDE AND ALBUTEROL SULFATE 1 AMPULE: .5; 3 SOLUTION RESPIRATORY (INHALATION) at 11:13

## 2020-02-04 RX ADMIN — Medication 10 ML: at 23:50

## 2020-02-04 RX ADMIN — AZITHROMYCIN DIHYDRATE 500 MG: 500 INJECTION, POWDER, LYOPHILIZED, FOR SOLUTION INTRAVENOUS at 18:27

## 2020-02-04 RX ADMIN — METHYLPREDNISOLONE SODIUM SUCCINATE 40 MG: 40 INJECTION, POWDER, FOR SOLUTION INTRAMUSCULAR; INTRAVENOUS at 23:50

## 2020-02-04 RX ADMIN — METOPROLOL TARTRATE 25 MG: 25 TABLET, FILM COATED ORAL at 18:30

## 2020-02-04 ASSESSMENT — ENCOUNTER SYMPTOMS
ABDOMINAL DISTENTION: 0
PHOTOPHOBIA: 0
NAUSEA: 0
VOMITING: 0
EYE DISCHARGE: 0
ABDOMINAL PAIN: 0
WHEEZING: 0
SORE THROAT: 0
CHEST TIGHTNESS: 0
DIARRHEA: 0
COUGH: 0
SHORTNESS OF BREATH: 1

## 2020-02-04 ASSESSMENT — PAIN SCALES - GENERAL
PAINLEVEL_OUTOF10: 5
PAINLEVEL_OUTOF10: 0

## 2020-02-04 ASSESSMENT — PAIN DESCRIPTION - PAIN TYPE: TYPE: ACUTE PAIN

## 2020-02-04 ASSESSMENT — PAIN DESCRIPTION - LOCATION: LOCATION: CHEST

## 2020-02-04 NOTE — ED PROVIDER NOTES
Allergic/Immunologic: Negative for immunocompromised state. Neurological: Negative for dizziness and syncope. Hematological: Negative for adenopathy. Psychiatric/Behavioral: Negative for agitation, hallucinations, self-injury and sleep disturbance. The patient is not nervous/anxious. All other systems reviewed and are negative. Except as noted above the remainder of the review of systems was reviewed and negative. PAST MEDICAL HISTORY     Past Medical History:   Diagnosis Date    COPD (chronic obstructive pulmonary disease) ([facility] [address]          SURGICALHISTORY       Past Surgical History:   Procedure Laterality Date    BACK SURGERY           CURRENT MEDICATIONS       Previous Medications    ALBUTEROL (PROVENTIL) (2.5 MG/3ML) 0.083% NEBULIZER SOLUTION    Take 2.5 mg by nebulization every 6 hours as needed for Wheezing       ALLERGIES     Aspirin; Ibuprofen; and Penicillins    FAMILY HISTORY     History reviewed. No pertinent family history. SOCIAL HISTORY       Social History     Socioeconomic History    Marital status:       Spouse name: None    Number of children: None    Years of education: None    Highest education level: None   Occupational History    None   Social Needs    Financial resource strain: None    Food insecurity:     Worry: None     Inability: None    Transportation needs:     Medical: None     Non-medical: None   Tobacco Use    Smoking status: Former Smoker    Smokeless tobacco: Never Used   Substance and Sexual Activity    Alcohol use: No    Drug use: No    Sexual activity: None   Lifestyle    Physical activity:     Days per week: None     Minutes per session: None    Stress: None   Relationships    Social connections:     Talks on phone: None     Gets together: None     Attends Scientologist service: None     Active member of club or organization: None     Attends meetings of clubs or organizations: None     Relationship status: None    Intimate partner violence:     Fear of current or ex partner: None     Emotionally abused: None     Physically abused: None     Forced sexual activity: None   Other Topics Concern    None   Social History Narrative    None       SCREENINGS    [place] Coma Scale  Eye Opening: Spontaneous  Best Verbal Response: Oriented  Best Motor Response: Obeys commands  [place] Coma Scale Score: 15 @FLOW([id number])@      PHYSICAL EXAM    (up to 7 for level 4, 8 or more for level 5)     ED Triage Vitals [02/04/20 1023]   BP Temp Temp Source Pulse Resp SpO2 Height Weight   (!) 151/86 98.9 °F (37.2 °C) Oral 121 (!) 36 96 % 5' 5\" (1.651 m) 200 lb (90.7 kg)       Physical Exam  Vitals signs and nursing note reviewed. Constitutional:       Appearance: She is well-developed. HENT:      Head: Normocephalic. Right Ear: Tympanic membrane normal.      Left Ear: Tympanic membrane normal.      Nose: Nose normal.      Mouth/Throat:      Mouth: Mucous membranes are dry. Eyes:      Conjunctiva/sclera: Conjunctivae normal.      Pupils: Pupils are equal, round, and reactive to light. Neck:      Musculoskeletal: Normal range of motion and neck supple. Cardiovascular:      Rate and Rhythm: Regular rhythm. Tachycardia present. Heart sounds: Normal heart sounds. Pulmonary:      Effort: Tachypnea present. Breath sounds: Wheezing present. Abdominal:      General: Bowel sounds are normal.      Palpations: Abdomen is soft. Tenderness: There is no abdominal tenderness. There is no guarding. Musculoskeletal: Normal range of motion. Skin:     General: Skin is warm and dry. Capillary Refill: Capillary refill takes less than 2 seconds. Neurological:      Mental Status: She is alert and oriented to person, place, and time.    Psychiatric:         Mood and Affect: Mood normal.         DIAGNOSTIC RESULTS     EKG: All EKG's are interpreted by the Emergency Department Physician who either signs or Co-signsthis chart in the absence of a cardiologist.    EKG shows a sinus tachycardia rate of 123. No acute ST-T wave changes. Normal axis. Abnormal EKG. RADIOLOGY:   Non-plain filmimages such as CT, Ultrasound and MRI are read by the radiologist. Plain radiographic images are visualized and preliminarily interpreted by the emergency physician with the below findings:        Interpretation per the Radiologist below, if available at the time ofthis note:    CTA Chest W WO  (PE study)   Final Result      No CT evidence pulmonary embolism. Diffuse emphysema. Bilateral multifocal areas of atelectatic changes described. Probable nodular focus of atelectasis, posterior left lung base. If clinical concern warrants, follow-up CT imaging in 3 months following treatment may be utilized to demonstrate resolution of finding and rule out underlying malignancy. Ascending aortic ectasia. Multiple borderline lymph nodes as discussed, and mediastinum and right hilum, most likely reactive in etiology. Other findings discussed. All CT scans at this facility use dose modulation, iterative reconstruction, and/or weight based dosing when appropriate to reduce radiation dose to as low as reasonably achievable. CT Head WO Contrast   Final Result   Age-related changes. Nothing acute intracranially.                   XR CHEST PORTABLE    (Results Pending)         ED BEDSIDE ULTRASOUND:   Performed by ED Physician - none    LABS:  Labs Reviewed   COMPREHENSIVE METABOLIC PANEL - Abnormal; Notable for the following components:       Result Value    CO2 33 (*)     Glucose 119 (*)     CREATININE 0.43 (*)     Alb 3.2 (*)     Globulin 3.7 (*)     All other components within normal limits   CBC WITH AUTO DIFFERENTIAL - Abnormal; Notable for the following components:    WBC 12.8 (*)     RBC 3.90 (*)     .4 (*)     MCH 32.4 (*)     MCHC 31.9 (*)     Neutrophils Absolute 11.5 (*)     Lymphocytes Absolute 0.5 (*)     All other components within normal limits   CULTURE BLOOD #1   CULTURE BLOOD #2   LACTIC ACID, PLASMA   URINE RT REFLEX TO CULTURE       All other labs were within normal range or not returned as of this dictation. EMERGENCY DEPARTMENT COURSE and DIFFERENTIAL DIAGNOSIS/MDM:   Vitals:    Vitals:    02/04/20 1114 02/04/20 1200 02/04/20 1230 02/04/20 1310   BP:  (!) 141/84 (!) 143/77 (!) 150/88   Pulse:  126 119 119   Resp:  24 30 23   Temp:       TempSrc:       SpO2: 100% 94% 96% 93%   Weight:       Height:              MDM patient's CT scan is negative for pulmonary embolism. However she has quite a bit of atelectasis versus early pneumonia. She is currently afebrile. Is treated for acute COPD exacerbation with relative hypoxia and tachypnea and tachycardia. Should be watched closely for any worsening symptoms. CONSULTS:  None    PROCEDURES:  Unless otherwise noted below, none     Procedures    FINAL IMPRESSION      1. COPD exacerbation ([address]    2. Tachycardia          DISPOSITION/PLAN   DISPOSITION Decision To Admit 02/04/2020 01:54:32 PM      PATIENT REFERRED TO:  No follow-up provider specified.     DISCHARGE MEDICATIONS:  New Prescriptions    No medications on file          (Please note that portions of this note were completed with a voice recognition program.  Efforts were made to edit the dictations but occasionally words are mis-transcribed.)    Yue Fine MD (electronically signed)  Attending Emergency Physician          Yue Fine MD  02/04/20 [phone]

## 2020-02-04 NOTE — ED NOTES
Pt resting in bed, eyes closed. Respirations tachypneic but unlabored. Skin warm and dry. Family members at bedside and deny any needs.       Rita Keen RN  02/04/20 5474

## 2020-02-04 NOTE — H&P
Hospital Medicine History & Physical      PCP: Renan Still DO    Date of Admission: 2/4/2020    Date of Service: Pt seen/examined on 2/4/2020 Admitted to Inpatient with expected LOS greater than two midnights due to medical therapy. Chief Complaint:  SOB, weakness and difficult ambulation      History Of Present Illness:      80 y.o. female with PMhx of COPD who presented to [facility] ED with above chief complaint which started about 3 to 4 days ago and unimproved. Patient stated that she uses 2 L nasal cannula oxygen at nighttime but has been using it continuously both day and night since symptoms started. Patient also stated she has been having difficulty ambulating due to weakness in her legs and reported fall yesterday. Family states patient ambulate with a walker or cane at baseline. patient denies any cough, CP,nausea, vomiting fever, or chills or diarrhea. Patient added that she uses her breathing treatment 4 times daily with minimal short-term improvement. She denies any recent tobacco use, states she smoked in the past but quits in the 66's. Past Medical History:          Diagnosis Date    COPD (chronic obstructive pulmonary disease) ([facility] [address]        Past Surgical History:          Procedure Laterality Date    BACK SURGERY         Medications Prior to Admission:      Prior to Admission medications    Medication Sig Start Date End Date Taking? Authorizing Provider   albuterol (PROVENTIL) (2.5 MG/3ML) 0.083% nebulizer solution Take 2.5 mg by nebulization every 6 hours as needed for Wheezing    Historical Provider, MD       Allergies:  Aspirin; Ibuprofen; and Penicillins    Social History:      The patient currently lives@ home     TOBACCO:   reports that she has quit smoking. She has never used smokeless tobacco.  ETOH:   reports no history of alcohol use. Family History:      Reviewed in detail and negative for DM, CAD, Cancer, CVA. Positive as follows:    History reviewed.  No pertinent family history. REVIEW OF SYSTEMS:   Pertinent positives as noted in the HPI. All other systems reviewed and negative. PHYSICAL EXAM:    BP (!) 151/91   Pulse 127   Temp 98.9 °F (37.2 °C) (Oral)   Resp 26   Ht 5' 5\" (1.651 m)   Wt 200 lb (90.7 kg)   SpO2 94%   BMI 33.28 kg/m²     General appearance:  No apparent distress, appears stated age and cooperative. HEENT:  Normal cephalic, atraumatic without obvious deformity. Pupils equal, round, and reactive to light. Extra ocular muscles intact. Conjunctivae/corneas clear. Neck: Supple, with full range of motion. No jugular venous distention. Trachea midline. Respiratory: some effort with respiration, tachypnea, wheezes otherwise diminished lung sounds  Cardiovascular: Tachycardia without murmurs, rubs or gallops. Abdomen: Soft, non-tender, non-distended with normal bowel sounds. Musculoskeletal:  No clubbing, cyanosis or edema bilaterally. .  Skin: Skin color, texture, turgor normal.  No rashes or lesions. Neurologic: :  Alert and oriented, thought content appropriate, normal insight  Capillary Refill: Brisk,< 3 seconds   Peripheral Pulses: +2 palpable, equal bilaterally       Labs:     Recent Labs     02/04/20  1138   WBC 12.8*   HGB 12.6   HCT 39.5        Recent Labs     02/04/20  1137      K 4.0   CL 99   CO2 33*   BUN 18   CREATININE 0.43*   CALCIUM 9.4     Recent Labs     02/04/20  1137   AST 13   ALT 13   BILITOT 0.3   ALKPHOS 85     No results for input(s): INR in the last 72 hours. No results for input(s): Eugune Ran in the last 72 hours. Urinalysis:    No results found for: Kaz Corral, BACTERIA, [address] [facility], [place], [address], [place] [address]    Radiology:     CXR: I have reviewed the CXR with the following interpretation:   EKG:  I have reviewed the EKG with the following interpretation:    CTA Chest W WO  (PE study)   Final Result      No CT evidence pulmonary embolism. Diffuse emphysema.       Bilateral multifocal areas of atelectatic changes described. Probable nodular focus of atelectasis, posterior left lung base. If clinical concern warrants, follow-up CT imaging in 3 months following treatment may be utilized to demonstrate resolution of finding and rule out underlying malignancy. Ascending aortic ectasia. Multiple borderline lymph nodes as discussed, and mediastinum and right hilum, most likely reactive in etiology. Other findings discussed. All CT scans at this facility use dose modulation, iterative reconstruction, and/or weight based dosing when appropriate to reduce radiation dose to as low as reasonably achievable. CT Head WO Contrast   Final Result   Age-related changes. Nothing acute intracranially. XR CHEST PORTABLE    (Results Pending)       ASSESSMENT/Plan :  Acute on chronic respiratory failure d/t COPD with acute exacerbation: Continue IV Solu-Medrol, DuoNeb treatments, Acapella, IV azithromycin, consult pulmonology, obtain ABG, continue oxygen supplement and pulse ox monitoring for improvement    Tachycardia: EKG showed ST,partly d/t above,  start on betablocker and consult cardiology, telemetry monitoring , CT unremarkable for PE, repeat EKG, check TSH to rule out any underlying thyroid disease    Ascending Aortic Ectasia: consult cardiology    Weakness/ Frequent falls: PT/ OT eval and treat      DVT Prophylaxis: Lovenox  Diet: cardiac  Code Status:full  Dispo - inpatient        Cuca Vanegas, CHRISTOPHER - CNP    Thank you Gabriella Angel DO for the opportunity to be involved in this patient's care. If you have any questions or concerns please feel free to contact me.

## 2020-02-04 NOTE — PROGRESS NOTES
[facility] EMERGENCY [facility] AT [place] Respiratory Therapy Evaluation   Current Order:  DUONEB Q4 WA      Home Regimen: PRN      Ordering Physician: Laquita Juan  Re-evaluation Date:  2/7     Diagnosis: COPD EXACERBATION      Patient Status: Stable / Unstable + Physician notified    The following MDI Criteria must be met in order to convert aerosol to MDI with spacer. If unable to meet, MDI will be converted to aerosol:  []  Patient able to demonstrate the ability to use MDI effectively  []  Patient alert and cooperative  []  Patient able to take deep breath with 5-10 second hold  []  Medication(s) available in this delivery method   []  Peak flow greater than or equal to 200 ml/min            Current Order Substituted To  (same drug, same frequency)   Aerosol to MDI [] Albuterol Sulfate 0.083% unit dose by aerosol Albuterol Sulfate MDI 2 puffs by inhalation with spacer    [] Levalbuterol 1.25 mg unit dose by aerosol Levalbuterol MDI 2 puffs by inhalation with spacer    [] Levalbuterol 0.63 mg unit dose by aerosol Levalbuterol MDI 2 puffs by inhalation with spacer    [] Ipratropium Bromide 0.02% unit dose by aerosol Ipratropium Bromide MDI 2 puffs by inhalation with spacer    [] Duoneb (Ipratropium + Albuterol) unit dose by aerosol Ipratropium MDI + Albuterol MDI 2 puffs by inhalation w/spacer   MDI to Aerosol [] Albuterol Sulfate MDI Albuterol Sulfate 0.083% unit dose by aerosol    [] Levalbuterol MDI 2 puffs by inhalation Levalbuterol 1.25 mg unit dose by aerosol    [] Ipratropium Bromide MDI by inhalation Ipratropium Bromide 0.02% unit dose by aerosol    [] Combivent (Ipratropium + Albuterol) MDI by inhalation Duoneb (Ipratropium + Albuterol) unit dose by aerosol   Treatment Assessment [Frequency/Schedule]:  Change frequency to: ____NO CHANGE______________________________________________per Protocol, P&T, MEC      Points 0 1 2 3 4   Pulmonary Status  Non-Smoker  []   Smoking history   < 20 pack years  []   Smoking history  ?  20 pack years  []

## 2020-02-04 NOTE — ED NOTES
Pt resting in bed with family at bedside. Pt hungry and given pudding, applesauce and coffee. Pt not SOB at this time. Denies any further needs. Awaiting bed assignment.      Kenny Coates RN  02/04/20 2294

## 2020-02-04 NOTE — CARE COORDINATION
[facility] AT [place] Case Management Initial Discharge Assessment    Met with Patient and Family to discuss discharge plan. PCP: Sarkis Barnes DO                                Date of Last Visit: 1 month ago. If no PCP, list provided? N/A    Discharge Planning    Living Arrangements: with sister and her son, she does have difficulty getting in and out of the home at this time due to weakness per sister. Who do you live with? Sister and son    Who helps you with your care:  family    If lives at home:     Do you have any barriers navigating in your home? This may be an issue if pt is still too weak to get inside. Patient can perform ADL? May need assistance    Current Services (outpatient and in home) :  None    Dialysis: No    Is transportation available to get to your appointments? Yes    DME Equipment:  yes - wheel chair, walker per sister    Respiratory equipment: Continuous Oxygen  2 Liters   Sister is not sure how many liters  Respiratory provider:  Not listed     Pharmacy:  yes - [facility]    Consult with Medication Assistance Program?  {YES/NO:06313}      Patient agreeable to [place] [address]? {BKK6:09888}    Patient agreeable to SNF/Rehab? {VUY9:23417}    Other discharge needs identified? {KNV3:31346}    Freedom of choice list provided with basic dialogue that supports the patient's individualized plan of care/goals and shares the quality data associated with the providers. {Yes No T/K:[id number]}    Does Patient Have a High-Risk for Readmission Diagnosis (CHF, PN, MI, COPD)? {UYP1:09058}    If Yes,     Consult with pulmonologist? {CYG6:66377}   Consult with cardiologist? {KIT3:03268}   Cardiac Rehab referral if EF <35%? {EBR8:27012}   Consult with Pharmacy for medication assessment prior to discharge? {CLP4:07798}   Consult with Behavioral health to aid in depression, anxiety, or coping issues? {XMK0:72731}   Palliative Care Consult?  {BRT6:89486}   Pulmonary Rehab order for COPD, PN,

## 2020-02-05 LAB
ANION GAP SERPL CALCULATED.3IONS-SCNC: 11 MEQ/L (ref 9–15)
BACTERIA: ABNORMAL /HPF
BILIRUBIN URINE: NEGATIVE
BLOOD, URINE: NEGATIVE
BUN BLDV-MCNC: 16 MG/DL (ref 8–23)
CALCIUM SERPL-MCNC: 9.5 MG/DL (ref 8.5–9.9)
CHLORIDE BLD-SCNC: 99 MEQ/L (ref 95–107)
CLARITY: CLEAR
CO2: 33 MEQ/L (ref 20–31)
COLOR: YELLOW
CREAT SERPL-MCNC: 0.35 MG/DL (ref 0.5–0.9)
EKG ATRIAL RATE: 105 BPM
EKG ATRIAL RATE: 123 BPM
EKG P AXIS: 58 DEGREES
EKG P AXIS: 59 DEGREES
EKG P-R INTERVAL: 126 MS
EKG P-R INTERVAL: 130 MS
EKG Q-T INTERVAL: 308 MS
EKG Q-T INTERVAL: 312 MS
EKG QRS DURATION: 76 MS
EKG QRS DURATION: 82 MS
EKG QTC CALCULATION (BAZETT): 412 MS
EKG QTC CALCULATION (BAZETT): 440 MS
EKG R AXIS: 22 DEGREES
EKG R AXIS: 35 DEGREES
EKG T AXIS: 54 DEGREES
EKG T AXIS: 60 DEGREES
EKG VENTRICULAR RATE: 105 BPM
EKG VENTRICULAR RATE: 123 BPM
EPITHELIAL CELLS, UA: ABNORMAL /HPF (ref 0–5)
GFR AFRICAN AMERICAN: >60
GFR NON-AFRICAN AMERICAN: >60
GLUCOSE BLD-MCNC: 127 MG/DL (ref 70–99)
GLUCOSE URINE: NEGATIVE MG/DL
HCT VFR BLD CALC: 35.5 % (ref 37–47)
HEMOGLOBIN: 11.5 G/DL (ref 12–16)
HYALINE CASTS: ABNORMAL /HPF (ref 0–5)
INFLUENZA A BY PCR: NEGATIVE
INFLUENZA B BY PCR: NEGATIVE
KETONES, URINE: NEGATIVE MG/DL
LEUKOCYTE ESTERASE, URINE: NEGATIVE
LV EF: 65 %
LVEF MODALITY: NORMAL
MCH RBC QN AUTO: 32.7 PG (ref 27–31.3)
MCHC RBC AUTO-ENTMCNC: 32.5 % (ref 33–37)
MCV RBC AUTO: 100.6 FL (ref 82–100)
NITRITE, URINE: NEGATIVE
PDW BLD-RTO: 13.2 % (ref 11.5–14.5)
PH UA: 6 (ref 5–9)
PLATELET # BLD: 246 K/UL (ref 130–400)
POTASSIUM REFLEX MAGNESIUM: 4.4 MEQ/L (ref 3.4–4.9)
PROTEIN UA: 30 MG/DL
RBC # BLD: 3.53 M/UL (ref 4.2–5.4)
RBC UA: ABNORMAL /HPF (ref 0–5)
SODIUM BLD-SCNC: 143 MEQ/L (ref 135–144)
SPECIFIC GRAVITY UA: 1.02 (ref 1–1.03)
URINE REFLEX TO CULTURE: YES
UROBILINOGEN, URINE: 1 E.U./DL
WBC # BLD: 9.3 K/UL (ref 4.8–10.8)
WBC UA: ABNORMAL /HPF (ref 0–5)

## 2020-02-05 PROCEDURE — 6360000002 HC RX W HCPCS: Performed by: NURSE PRACTITIONER

## 2020-02-05 PROCEDURE — 1210000000 HC MED SURG R&B

## 2020-02-05 PROCEDURE — 94667 MNPJ CHEST WALL 1ST: CPT

## 2020-02-05 PROCEDURE — 99232 SBSQ HOSP IP/OBS MODERATE 35: CPT | Performed by: INTERNAL MEDICINE

## 2020-02-05 PROCEDURE — 81001 URINALYSIS AUTO W/SCOPE: CPT

## 2020-02-05 PROCEDURE — 36415 COLL VENOUS BLD VENIPUNCTURE: CPT

## 2020-02-05 PROCEDURE — 93306 TTE W/DOPPLER COMPLETE: CPT

## 2020-02-05 PROCEDURE — 93005 ELECTROCARDIOGRAM TRACING: CPT | Performed by: INTERNAL MEDICINE

## 2020-02-05 PROCEDURE — 87502 INFLUENZA DNA AMP PROBE: CPT

## 2020-02-05 PROCEDURE — 6360000002 HC RX W HCPCS: Performed by: INTERNAL MEDICINE

## 2020-02-05 PROCEDURE — 94640 AIRWAY INHALATION TREATMENT: CPT

## 2020-02-05 PROCEDURE — 94761 N-INVAS EAR/PLS OXIMETRY MLT: CPT

## 2020-02-05 PROCEDURE — 2700000000 HC OXYGEN THERAPY PER DAY

## 2020-02-05 PROCEDURE — 97167 OT EVAL HIGH COMPLEX 60 MIN: CPT

## 2020-02-05 PROCEDURE — 80048 BASIC METABOLIC PNL TOTAL CA: CPT

## 2020-02-05 PROCEDURE — 99223 1ST HOSP IP/OBS HIGH 75: CPT | Performed by: INTERNAL MEDICINE

## 2020-02-05 PROCEDURE — 87086 URINE CULTURE/COLONY COUNT: CPT

## 2020-02-05 PROCEDURE — 85027 COMPLETE CBC AUTOMATED: CPT

## 2020-02-05 PROCEDURE — 2580000003 HC RX 258: Performed by: NURSE PRACTITIONER

## 2020-02-05 PROCEDURE — 6370000000 HC RX 637 (ALT 250 FOR IP): Performed by: NURSE PRACTITIONER

## 2020-02-05 PROCEDURE — 6370000000 HC RX 637 (ALT 250 FOR IP): Performed by: INTERNAL MEDICINE

## 2020-02-05 PROCEDURE — 97162 PT EVAL MOD COMPLEX 30 MIN: CPT

## 2020-02-05 RX ORDER — ASPIRIN 81 MG/1
81 TABLET ORAL DAILY
Status: DISCONTINUED | OUTPATIENT
Start: 2020-02-05 | End: 2020-02-10 | Stop reason: HOSPADM

## 2020-02-05 RX ORDER — PANTOPRAZOLE SODIUM 40 MG/1
40 TABLET, DELAYED RELEASE ORAL
Status: DISCONTINUED | OUTPATIENT
Start: 2020-02-06 | End: 2020-02-10 | Stop reason: HOSPADM

## 2020-02-05 RX ORDER — BUDESONIDE 0.5 MG/2ML
0.5 INHALANT ORAL 2 TIMES DAILY
Status: DISCONTINUED | OUTPATIENT
Start: 2020-02-05 | End: 2020-02-10 | Stop reason: HOSPADM

## 2020-02-05 RX ORDER — ATORVASTATIN CALCIUM 20 MG/1
20 TABLET, FILM COATED ORAL NIGHTLY
Status: DISCONTINUED | OUTPATIENT
Start: 2020-02-05 | End: 2020-02-10 | Stop reason: HOSPADM

## 2020-02-05 RX ORDER — METOPROLOL TARTRATE 50 MG/1
50 TABLET, FILM COATED ORAL 2 TIMES DAILY
Status: DISCONTINUED | OUTPATIENT
Start: 2020-02-05 | End: 2020-02-10 | Stop reason: HOSPADM

## 2020-02-05 RX ORDER — PREDNISONE 20 MG/1
40 TABLET ORAL DAILY
Status: DISCONTINUED | OUTPATIENT
Start: 2020-02-06 | End: 2020-02-10 | Stop reason: HOSPADM

## 2020-02-05 RX ADMIN — IPRATROPIUM BROMIDE AND ALBUTEROL SULFATE 1 AMPULE: .5; 3 SOLUTION RESPIRATORY (INHALATION) at 07:19

## 2020-02-05 RX ADMIN — IPRATROPIUM BROMIDE AND ALBUTEROL SULFATE 1 AMPULE: .5; 3 SOLUTION RESPIRATORY (INHALATION) at 20:15

## 2020-02-05 RX ADMIN — METHYLPREDNISOLONE SODIUM SUCCINATE 40 MG: 40 INJECTION, POWDER, FOR SOLUTION INTRAMUSCULAR; INTRAVENOUS at 14:18

## 2020-02-05 RX ADMIN — METOPROLOL TARTRATE 12.5 MG: 25 TABLET ORAL at 08:53

## 2020-02-05 RX ADMIN — Medication 10 ML: at 08:53

## 2020-02-05 RX ADMIN — Medication 10 ML: at 20:46

## 2020-02-05 RX ADMIN — METHYLPREDNISOLONE SODIUM SUCCINATE 40 MG: 40 INJECTION, POWDER, FOR SOLUTION INTRAMUSCULAR; INTRAVENOUS at 06:39

## 2020-02-05 RX ADMIN — BUDESONIDE 500 MCG: 0.5 SUSPENSION RESPIRATORY (INHALATION) at 20:15

## 2020-02-05 RX ADMIN — AZITHROMYCIN DIHYDRATE 500 MG: 500 INJECTION, POWDER, LYOPHILIZED, FOR SOLUTION INTRAVENOUS at 17:26

## 2020-02-05 RX ADMIN — ENOXAPARIN SODIUM 40 MG: 40 INJECTION SUBCUTANEOUS at 08:53

## 2020-02-05 RX ADMIN — IPRATROPIUM BROMIDE AND ALBUTEROL SULFATE 1 AMPULE: .5; 3 SOLUTION RESPIRATORY (INHALATION) at 15:22

## 2020-02-05 RX ADMIN — ATORVASTATIN CALCIUM 20 MG: 20 TABLET, FILM COATED ORAL at 20:47

## 2020-02-05 RX ADMIN — METOPROLOL TARTRATE 50 MG: 50 TABLET, FILM COATED ORAL at 20:47

## 2020-02-05 ASSESSMENT — ENCOUNTER SYMPTOMS
CHEST TIGHTNESS: 0
GASTROINTESTINAL NEGATIVE: 1
WHEEZING: 1
EYES NEGATIVE: 1
BLOOD IN STOOL: 0
COUGH: 0
SHORTNESS OF BREATH: 1
STRIDOR: 0
NAUSEA: 0

## 2020-02-05 ASSESSMENT — PAIN SCALES - GENERAL
PAINLEVEL_OUTOF10: 0

## 2020-02-05 NOTE — PROGRESS NOTES
Physical Therapy Med Surg Initial Assessment  Facility/Department: [facility]  [facility]       NAME: Kavin Knox  : 1934 (80 y.o.)  MRN: [id number]  CODE STATUS: Full Code    Date of Service: 2020    Patient Diagnosis(es): COPD with acute exacerbation [facility]) [J44.1]   Chief Complaint   Patient presents with    Shortness of Breath    Fall     hit face, states no blood thinners     Patient Active Problem List    Diagnosis Date Noted    COPD with acute exacerbation ([facility] 2020    Pneumonia 2019        Past Medical History:   Diagnosis Date    COPD (chronic obstructive pulmonary disease) ([facility]      Past Surgical History:   Procedure Laterality Date    BACK SURGERY         Chart Reviewed: Yes  Patient assessed for rehabilitation services?: Yes  Family / Caregiver Present: No  General Comment  Comments: Pt awake in bed, agreeable to PT eval with encouragement. Restrictions:  Restrictions/Precautions: Isolation, Fall Risk(Droplet)     SUBJECTIVE: Subjective: Pt not wanting to get OOB but eventually agreeable when discussing risks of staying in bed and benefits of sitting up in chair even for an hour.     Pain  Pre Treatment Pain Screening  Pain at present: 0    Post Treatment Pain Screening:   Pain Screening  Patient Currently in Pain: Denies  Pain Assessment  Pain Level: 0    Prior Level of Function:  Social/Functional History  Lives With: Family  Type of Home: House  Home Layout: One level  Home Access: Stairs to enter with rails  Entrance Stairs - Number of Steps: 3  Bathroom Shower/Tub: Walk-in shower, Shower chair with back  Home Equipment: 4 wheeled walker, Oxygen(2L O2)  Receives Help From: Family  ADL Assistance: Independent  Homemaking Assistance: Needs assistance(provided by family)  Ambulation Assistance: Independent(Rollator)  Transfer Assistance: Independent  Active : No  Patient's  Info: Niece    OBJECTIVE:   Vision: Impaired  Vision Exceptions: SOB with bed mobility and unable to safely progress to ambulation. Pt will benefit from continued PT to progress mobility as tolerated.   REQUIRES PT FOLLOW UP: Yes      PLAN OF CARE:  Plan  Times per week: 3-6  Current Treatment Recommendations: Strengthening, Transfer Training, Endurance Training, Neuromuscular Re-education, Equipment Evaluation, Education, & procurement, Balance Training, Gait Training, Home Exercise Program, Functional Mobility Training, Safety Education & Training  Safety Devices  Type of devices: Call light within reach, Chair alarm in place    Goals:  Patient goals : to go home soon  Long term goals  Long term goal 1: supervision with bed mobility  Long term goal 2: supervision with transfers  Long term goal 3: pt to ambulate >50 ft with supervision [employer]  Long term goal 4: pt to navigate 3 steps with CGA    [facility] (6 CLICK) [address] Mobility Raw Score : 13     Therapy Time:   Individual   Time In 1009   Time Out 1025   Minutes [address] [place] [address], 02/05/20 at 12:07 PM

## 2020-02-05 NOTE — PROGRESS NOTES
Progress Note  Patient: Ann-Marie Hernandez  Unit/Bed: M422/Z976-64  YOB: 1934  MRN: [id number]  Acct: [de-identified]   Admitting Diagnosis: COPD with acute exacerbation ([facility] [J44.1]  Admit Date:  2/4/2020  Hospital Day: 1    Chief Complaint: COPD Tachycardia Aortic Dilatation    Histories:  Past Medical History:   Diagnosis Date    COPD (chronic obstructive pulmonary disease) ([facility]      Past Surgical History:   Procedure Laterality Date    BACK SURGERY       History reviewed. No pertinent family history. Social History     Socioeconomic History    Marital status:      Spouse name: None    Number of children: None    Years of education: None    Highest education level: None   Occupational History    None   Social Needs    Financial resource strain: None    Food insecurity:     Worry: None     Inability: None    Transportation needs:     Medical: None     Non-medical: None   Tobacco Use    Smoking status: Former Smoker    Smokeless tobacco: Never Used   Substance and Sexual Activity    Alcohol use: No    Drug use: No    Sexual activity: None   Lifestyle    Physical activity:     Days per week: None     Minutes per session: None    Stress: None   Relationships    Social connections:     Talks on phone: None     Gets together: None     Attends Yazdanism service: None     Active member of club or organization: None     Attends meetings of clubs or organizations: None     Relationship status: None    Intimate partner violence:     Fear of current or ex partner: None     Emotionally abused: None     Physically abused: None     Forced sexual activity: None   Other Topics Concern    None   Social History Narrative    None       Subjective/HPI  Still sob and weak. Eating ok. Not walking. EKG:         Review of Systems:   Review of Systems   Constitutional: Negative. Negative for diaphoresis and fatigue. HENT: Negative. Eyes: Negative.     Respiratory: Positive for shortness of breath and wheezing. Negative for cough, chest tightness and stridor. Cardiovascular: Negative. Negative for chest pain, palpitations and leg swelling. Gastrointestinal: Negative. Negative for blood in stool and nausea. Genitourinary: Negative. Musculoskeletal: Positive for arthralgias and gait problem. Skin: Negative. Neurological: Positive for weakness. Negative for dizziness, syncope and light-headedness. Hematological: Negative. Psychiatric/Behavioral: Negative. Physical Examination:    /77   Pulse 101   Temp 97.1 °F (36.2 °C) (Oral)   Resp 16   Ht 5' 5\" (1.651 m)   Wt 200 lb (90.7 kg)   SpO2 92%   BMI 33.28 kg/m²    Physical Exam   Constitutional: No distress. She appears chronically ill and acutely ill. HENT:   Normal cephalic and Atraumatic   Eyes: Pupils are equal, round, and reactive to light. Neck: Normal range of motion and thyroid normal. Neck supple. No JVD present. No neck adenopathy. No thyromegaly present. Cardiovascular: Regular rhythm, intact distal pulses and normal pulses. Tachycardia present. Murmur heard. Pulmonary/Chest: Effort normal. She has no rales. She has diffuse wheezes. She exhibits no tenderness. Abdominal: Soft. Bowel sounds are normal. There is no abdominal tenderness. Musculoskeletal: Normal range of motion. General: No tenderness or edema. Neurological: She is alert and oriented to person, place, and time. Skin: Skin is warm. No cyanosis. Nails show no clubbing.        LABS:  CBC:   Lab Results   Component Value Date    WBC 9.3 02/05/2020    RBC 3.53 02/05/2020    HGB 11.5 02/05/2020    HCT 35.5 02/05/2020    .6 02/05/2020    MCH 32.7 02/05/2020    MCHC 32.5 02/05/2020    RDW 13.2 02/05/2020     02/05/2020     CBC with Differential:    Lab Results   Component Value Date    WBC 9.3 02/05/2020    RBC 3.53 02/05/2020    HGB 11.5 02/05/2020    HCT 35.5 02/05/2020     02/05/2020    .6 02/05/2020    MCH 32.7 02/05/2020    MCHC 32.5 02/05/2020    RDW 13.2 02/05/2020    LYMPHOPCT 4.0 02/04/2020    MONOPCT 6.0 02/04/2020    BASOPCT 0.2 02/04/2020    MONOSABS 0.8 02/04/2020    LYMPHSABS 0.5 02/04/2020    EOSABS 0.0 02/04/2020    BASOSABS 0.0 02/04/2020     CMP:    Lab Results   Component Value Date     02/05/2020    K 4.4 02/05/2020    CL 99 02/05/2020    CO2 33 02/05/2020    BUN 16 02/05/2020    CREATININE 0.35 02/05/2020    GFRAA >60.0 02/05/2020    LABGLOM >60.0 02/05/2020    GLUCOSE 127 02/05/2020    PROT 6.9 02/04/2020    LABALBU 3.2 02/04/2020    CALCIUM 9.5 02/05/2020    BILITOT 0.3 02/04/2020    ALKPHOS 85 02/04/2020    AST 13 02/04/2020    ALT 13 02/04/2020     BMP:    Lab Results   Component Value Date     02/05/2020    K 4.4 02/05/2020    CL 99 02/05/2020    CO2 33 02/05/2020    BUN 16 02/05/2020    LABALBU 3.2 02/04/2020    CREATININE 0.35 02/05/2020    CALCIUM 9.5 02/05/2020    GFRAA >60.0 02/05/2020    LABGLOM >60.0 02/05/2020    GLUCOSE 127 02/05/2020     Magnesium:  No results found for: MG  Troponin:    Lab Results   Component Value Date    TROPONINI <0.010 07/31/2019        Active Hospital Problems    Diagnosis Date Noted    COPD with acute exacerbation ([facility] [J44.1] 02/04/2020     Priority: Low        Assessment/Plan:  1. AECOPD  2. Sinus tachycardia appropraite - will advance BB  3. Aortic Ectasia w mild dilation- add ASA( allergy is Upset stomach) and PPI . Add Statin. Advance BB  4.  Will review Echo        Electronically signed by Jessee Muñiz MD on 2/5/2020 at 9:18 AM

## 2020-02-05 NOTE — FLOWSHEET NOTE
1030- PATIENT UP IN CHAIR AT BEDSIDE PER PHYSICAL THERAPY. BRUISING PERSISTS ON ARMS. LUNG YOU ARE DIMINISHED WITH FINE CRACKLES AUSCULTATED POSTERIOR THROUGHTOUT. NO COUGH AT THIS TIME.

## 2020-02-05 NOTE — PROGRESS NOTES
% injection 10 mL  10 mL Intravenous PRN Areta Beecham, APRN - CNP        magnesium hydroxide (MILK OF MAGNESIA) 400 MG/5ML suspension 30 mL  30 mL Oral Daily PRN Areta Beecham, APRN - CNP        ondansetron (ZOFRAN) injection 4 mg  4 mg Intravenous Q6H PRN Areta Beecham, APRN - CNP        enoxaparin (LOVENOX) injection 40 mg  40 mg Subcutaneous Daily Areta Beecham, APRN - CNP   40 mg at 02/05/20 0853    albuterol (PROVENTIL) nebulizer solution 2.5 mg  2.5 mg Nebulization Q2H PRN Areta Beecham, APRN - CNP   2.5 mg at 02/04/20 1937    methylPREDNISolone sodium (SOLU-MEDROL) injection 40 mg  40 mg Intravenous Q8H Jeniffer Méndez, APRN - CNP   40 mg at 02/05/20 6250    Followed by   Peter Mcdaniel ON 2/7/2020] predniSONE (DELTASONE) tablet 40 mg  40 mg Oral Daily Areta Beecham, APRN - CNP        acetaminophen (TYLENOL) tablet 650 mg  650 mg Oral Q4H PRN Areta Beecham, APRN - CNP        azithromycin (ZITHROMAX) 500 mg in D5W 250ml addavial  500 mg Intravenous Q24H Areta Beecham, APRN - CNP   Stopped at 02/04/20 1947    ipratropium-albuterol (DUONEB) nebulizer solution 1 ampule  1 ampule Inhalation 4x daily Carlos Avalos MD   1 ampule at 02/05/20 0719     Assessment and Plan: Active problems:    #  COPD exacerbation  -Resume steroid, nebulizers and azithromycin. Pulmonary consulted. She is on oxygen at home. -Rapid influenza test is negative. #Sinus tachycardia due to above-improved monitor on telemetry. Cardiology seen already. #Ascending aortic ectasia-seen on CT PE.  -Blood pressure stable. Echo pending. Check the diameter as per cardiology. #Deconditioning  -PT/OT         DISCHARGE PLANNING  Pending work-up, PT/OT evaluation.        Electronically signed by Hector Lee DO on 2/5/2020 at 11:24 AM

## 2020-02-05 NOTE — CARE COORDINATION
[facility] Case Management Initial Discharge Assessment    Met with Patient and Son to discuss discharge plan. PCP: Dr. Vianney Galeano                                 Date of Last Visit: 1/9/2020    If no PCP, list provided? N/A    Discharge Planning    Living Arrangements: at home dependent on family care    Who do you live with? Son states he has been staying with patient for 3 months    Who helps you with your care:  self or family    If lives at home:     Do you have any barriers navigating in your home? no    Patient can perform ADL? No - son assists    Current Services (outpatient and in home) : Outpatient therapy in [facility]    Dialysis: No    Is transportation available to get to your appointments? Yes - son transports    DME Equipment:  yes - walker, cane, rollator, showerchair    Respiratory equipment: PRN/HS Oxygen 2 Liters and Nebulizer, states has concentrator from [address] [place] [address] and [employer]    Respiratory provider:  yes - Unable to remember     Pharmacy:  yes - [facility]. Consult with Medication Assistance Program?  No      [facility] Unsure of needs      Patient agreeable to SNF/Rehab? Patient states no prior SNF/ Rehab - Accepted SNF list.      Other discharge needs identified? Palliative Care and Pulmonary Rehab    [place] of choice list provided with basic dialogue that supports the patient's individualized plan of care/goals and shares the quality data associated with the providers. Yes    Does Patient Have a High-Risk for Readmission Diagnosis (CHF, PN, MI, COPD)? Yes    If Yes,    Consult with pulmonologist? Yes  Consult with cardiologist? Yes  Cardiac Rehab referral if EF <35%? - If ordered, no EF available in [address] [place] [address] with Pharmacy for medication assessment prior to discharge? N/A  Consult with Behavioral health to aid in depression, anxiety, or coping issues? N/A  Palliative Care Consult?  Requested  Pulmonary Rehab order for COPD, PN, and CHF (if EF

## 2020-02-05 NOTE — CONSULTS
Pulmonary and Critical Care Medicine  Consult Note  Encounter Date: 2020 4:54 PM    Ms. Alisson Mcknight is a 80 y.o. female  : 1934   Requesting Provider: Farhat Shaver DO    Reason for request: SOB            HISTORY OF PRESENT ILLNESS:    Patient is 80 y.o. presents with shortness of breath, per notes this has been going on for 2 days. She had a fall yesterday and she has been having frequent falls at home. Patient is poor historian in general to me she denies any shortness of breath she says this is her baseline symptoms she is on oxygen while asleep at home, and she does have history of shortness of breath with exertion. Denies coughing or chest pain. Per reports she did have wheezing today with worsening shortness of breath earlier this morning however she does not recall any of that. No fever overnight, she has history of COPD, no prior PFT    Past Medical History:        Diagnosis Date    COPD (chronic obstructive pulmonary disease) ([facility]        Past Surgical History:        Procedure Laterality Date    BACK SURGERY         Social History:     reports that she has quit smoking. She has never used smokeless tobacco. She reports that she does not drink alcohol or use drugs. Family History:   History reviewed. No pertinent family history.     Allergies:  Aspirin; Ibuprofen; and Penicillins        MEDICATIONS during current hospitalization:    Continuous Infusions:    Scheduled Meds:   metoprolol tartrate  50 mg Oral BID    aspirin  81 mg Oral Daily    [START ON 2020] pantoprazole  40 mg Oral QAM AC    atorvastatin  20 mg Oral Nightly    sodium chloride flush  10 mL Intravenous 2 times per day    enoxaparin  40 mg Subcutaneous Daily    methylPREDNISolone  40 mg Intravenous Q8H    Followed by   Robbie Cano ON 2020] predniSONE  40 mg Oral Daily    azithromycin  500 mg Intravenous Q24H    ipratropium-albuterol  1 ampule Inhalation 4x daily       PRN Meds:sodium chloride flush, magnesium hydroxide, ondansetron, albuterol, acetaminophen        REVIEW OF SYSTEMS:  ROS: 10 organs review of system is done including general, psychological, ENT, hematological, endocrine, respiratory, cardiovascular, gastrointestinal, musculoskeletal, neurological,  allergy and Immunology is done and is otherwise negative. PHYSICAL EXAM:    Vitals:  /77   Pulse 103   Temp 97.1 °F (36.2 °C) (Oral)   Resp 18   Ht 5' 5\" (1.651 m)   Wt 200 lb (90.7 kg)   SpO2 94%   BMI 33.28 kg/m²     General: alert, cooperative, no distress  Head: normocephalic, atraumatic  Eyes:No gross abnormalities. , PERRL and Sclera nonicteric  ENT:  MMM no lesions  Neck:  supple and no masses  Chest : Poor air movement, no wheezing, mild bibasilar rales, nontender, tympanic  Heart[de-identified] Heart sounds are normal.  Regular rate and rhythm without murmur, gallop or rub. ABD:  symmetric, soft, non-tender  Musculoskeletal : no cyanosis, no clubbing and no edema  Neuro:  Grossly normal  Skin: No rashes or nodules noted. Lymph node:  no cervical nodes  Urology: Yes [place]   Psychiatric: appropriate        Data Review  Recent Labs     02/04/20  1138 02/04/20 2123 02/05/20  0521   WBC 12.8*  --  9.3   HGB 12.6 11.9* 11.5*   HCT 39.5  --  35.5*     --  246      Recent Labs     02/04/20  1137 02/04/20 2123 02/05/20  0520     --  143   K 4.0  --  4.4   CL 99  --  99   CO2 33*  --  33*   BUN 18  --  16   CREATININE 0.43* 0.5* 0.35*   GLUCOSE 119*  --  127*       MV Settings:           ABGs:   Recent Labs     02/04/20 2123   PHART 7.388   FGP0IGM 57*   PO2ART 74*   YMK1YRQ 34.4*   BEART 9*   Q4NLWISL 94*   KVT2NLX 36*     O2 Device: Nasal cannula  O2 Flow Rate (L/min): 4 L/min  Lab Results   Component Value Date    LACTA 0.9 02/04/2020    LACTA 1.7 01/02/2019       Radiology  I personally reviewed imaging studies and CT chest shows centrilobular emphysema, area of faint groundglass changes in the right upper lobe, and multiple areas of pleural-based nodules versus infiltrative changes         Assessment, plan:   Patient is at risk due to    · Shortness of breath, patient currently denies symptoms however I discussed with Dr. Julien Guillory she was wheezy and significantly short of breath this morning likely has COPD exacerbation currently chest exam is tight but no wheezing  · Multiple areas of pleural-based nodules and infiltrates some appears to be new compared to the CT thoracic spine done in July last year which include part of the lung that does not show same changes, differential diagnosis at this point is not clear could be inflammatory but need monitoring rule out malignant etiology, she denied asbestos exposure  · Pulmonary hypertension likely group 2 and 3  · Diastolic dysfunction  · Chronic toxic and hypercapnic respiratory failure on oxygen 2 L at home.      Recommendation  · Prednisone 40 mg daily  · Budesonide nebs twice daily  · DuoNebs every 6 hours while awake  · Incentive spirometry and flutter valve  · Vest 3 times daily  · Will repeat CAT scan in 6 weeks if changes persisted we will consider PET scan and if needed biopsy      Thank you for consultation    Electronically signed by Adri Granger MD, [facility],  on 2/5/2020 at 4:54 PM

## 2020-02-05 NOTE — PLAN OF CARE
See OT evaluation for all goals and OT POC.  Electronically signed by SUSAN Ross/L on 2/5/2020 at 11:00 AM

## 2020-02-06 LAB
ANION GAP SERPL CALCULATED.3IONS-SCNC: 10 MEQ/L (ref 9–15)
BUN BLDV-MCNC: 20 MG/DL (ref 8–23)
CALCIUM SERPL-MCNC: 9.3 MG/DL (ref 8.5–9.9)
CHLORIDE BLD-SCNC: 98 MEQ/L (ref 95–107)
CO2: 37 MEQ/L (ref 20–31)
CREAT SERPL-MCNC: 0.39 MG/DL (ref 0.5–0.9)
GFR AFRICAN AMERICAN: >60
GFR NON-AFRICAN AMERICAN: >60
GLUCOSE BLD-MCNC: 98 MG/DL (ref 70–99)
HCT VFR BLD CALC: 35.7 % (ref 37–47)
HEMOGLOBIN: 11.7 G/DL (ref 12–16)
MCH RBC QN AUTO: 32.8 PG (ref 27–31.3)
MCHC RBC AUTO-ENTMCNC: 32.8 % (ref 33–37)
MCV RBC AUTO: 99.9 FL (ref 82–100)
PDW BLD-RTO: 13 % (ref 11.5–14.5)
PLATELET # BLD: 286 K/UL (ref 130–400)
POTASSIUM REFLEX MAGNESIUM: 3.9 MEQ/L (ref 3.4–4.9)
RBC # BLD: 3.58 M/UL (ref 4.2–5.4)
SODIUM BLD-SCNC: 145 MEQ/L (ref 135–144)
WBC # BLD: 9.3 K/UL (ref 4.8–10.8)

## 2020-02-06 PROCEDURE — 6360000002 HC RX W HCPCS: Performed by: NURSE PRACTITIONER

## 2020-02-06 PROCEDURE — 1210000000 HC MED SURG R&B

## 2020-02-06 PROCEDURE — 94761 N-INVAS EAR/PLS OXIMETRY MLT: CPT

## 2020-02-06 PROCEDURE — 6360000002 HC RX W HCPCS: Performed by: INTERNAL MEDICINE

## 2020-02-06 PROCEDURE — 80048 BASIC METABOLIC PNL TOTAL CA: CPT

## 2020-02-06 PROCEDURE — 97535 SELF CARE MNGMENT TRAINING: CPT

## 2020-02-06 PROCEDURE — 99232 SBSQ HOSP IP/OBS MODERATE 35: CPT | Performed by: INTERNAL MEDICINE

## 2020-02-06 PROCEDURE — 94640 AIRWAY INHALATION TREATMENT: CPT

## 2020-02-06 PROCEDURE — 6370000000 HC RX 637 (ALT 250 FOR IP): Performed by: INTERNAL MEDICINE

## 2020-02-06 PROCEDURE — 97110 THERAPEUTIC EXERCISES: CPT

## 2020-02-06 PROCEDURE — 2700000000 HC OXYGEN THERAPY PER DAY

## 2020-02-06 PROCEDURE — 85027 COMPLETE CBC AUTOMATED: CPT

## 2020-02-06 PROCEDURE — 94760 N-INVAS EAR/PLS OXIMETRY 1: CPT

## 2020-02-06 PROCEDURE — 94668 MNPJ CHEST WALL SBSQ: CPT

## 2020-02-06 PROCEDURE — 94150 VITAL CAPACITY TEST: CPT

## 2020-02-06 PROCEDURE — 36415 COLL VENOUS BLD VENIPUNCTURE: CPT

## 2020-02-06 PROCEDURE — 2580000003 HC RX 258: Performed by: NURSE PRACTITIONER

## 2020-02-06 PROCEDURE — 6370000000 HC RX 637 (ALT 250 FOR IP): Performed by: NURSE PRACTITIONER

## 2020-02-06 RX ORDER — VALACYCLOVIR HYDROCHLORIDE 500 MG/1
500 TABLET, FILM COATED ORAL DAILY
COMMUNITY

## 2020-02-06 RX ORDER — PREDNISOLONE ACETATE 10 MG/ML
1 SUSPENSION/ DROPS OPHTHALMIC DAILY
COMMUNITY

## 2020-02-06 RX ADMIN — ACETAMINOPHEN 650 MG: 325 TABLET ORAL at 05:02

## 2020-02-06 RX ADMIN — PANTOPRAZOLE SODIUM 40 MG: 40 TABLET, DELAYED RELEASE ORAL at 05:03

## 2020-02-06 RX ADMIN — IPRATROPIUM BROMIDE AND ALBUTEROL SULFATE 1 AMPULE: .5; 3 SOLUTION RESPIRATORY (INHALATION) at 11:20

## 2020-02-06 RX ADMIN — IPRATROPIUM BROMIDE AND ALBUTEROL SULFATE 1 AMPULE: .5; 3 SOLUTION RESPIRATORY (INHALATION) at 21:44

## 2020-02-06 RX ADMIN — ASPIRIN 81 MG: 81 TABLET, COATED ORAL at 09:44

## 2020-02-06 RX ADMIN — METOPROLOL TARTRATE 50 MG: 50 TABLET, FILM COATED ORAL at 21:22

## 2020-02-06 RX ADMIN — ACETAMINOPHEN 650 MG: 325 TABLET ORAL at 21:22

## 2020-02-06 RX ADMIN — Medication 10 ML: at 21:22

## 2020-02-06 RX ADMIN — IPRATROPIUM BROMIDE AND ALBUTEROL SULFATE 1 AMPULE: .5; 3 SOLUTION RESPIRATORY (INHALATION) at 07:17

## 2020-02-06 RX ADMIN — Medication 10 ML: at 09:45

## 2020-02-06 RX ADMIN — BUDESONIDE 500 MCG: 0.5 SUSPENSION RESPIRATORY (INHALATION) at 21:44

## 2020-02-06 RX ADMIN — ENOXAPARIN SODIUM 40 MG: 40 INJECTION SUBCUTANEOUS at 09:45

## 2020-02-06 RX ADMIN — PREDNISONE 40 MG: 20 TABLET ORAL at 09:44

## 2020-02-06 RX ADMIN — IPRATROPIUM BROMIDE AND ALBUTEROL SULFATE 1 AMPULE: .5; 3 SOLUTION RESPIRATORY (INHALATION) at 15:48

## 2020-02-06 RX ADMIN — AZITHROMYCIN DIHYDRATE 500 MG: 500 INJECTION, POWDER, LYOPHILIZED, FOR SOLUTION INTRAVENOUS at 15:49

## 2020-02-06 RX ADMIN — METOPROLOL TARTRATE 50 MG: 50 TABLET, FILM COATED ORAL at 09:44

## 2020-02-06 RX ADMIN — ATORVASTATIN CALCIUM 20 MG: 20 TABLET, FILM COATED ORAL at 21:22

## 2020-02-06 RX ADMIN — BUDESONIDE 500 MCG: 0.5 SUSPENSION RESPIRATORY (INHALATION) at 07:17

## 2020-02-06 ASSESSMENT — ENCOUNTER SYMPTOMS
BLOOD IN STOOL: 0
CHEST TIGHTNESS: 0
STRIDOR: 0
SHORTNESS OF BREATH: 1
NAUSEA: 0
WHEEZING: 1
EYES NEGATIVE: 1
GASTROINTESTINAL NEGATIVE: 1
COUGH: 0

## 2020-02-06 ASSESSMENT — PAIN SCALES - GENERAL
PAINLEVEL_OUTOF10: 0
PAINLEVEL_OUTOF10: 2
PAINLEVEL_OUTOF10: 6
PAINLEVEL_OUTOF10: 5

## 2020-02-06 NOTE — CARE COORDINATION
Per the , PT/OT is recommending SNF. The LSW met with the patient. The patient is adamant that she is going home with Yesica[facility]. Patient states she told the doctor that she is going home and no SNF. LSW discussed the physical therapy recommendations and the patient is refusing SNF. LSW updated the .  Electronically signed by Medina Nogueira on 2/6/20 at 12:44 PM

## 2020-02-06 NOTE — PROGRESS NOTES
Physician Progress Note    2020   3:01 PM    Name:  Cassie Martinez  MRN:    [id number]     IP Day: 2     Admit Date: 2020 10:19 AM  PCP: Wilber Whatley DO    Code Status:  Full Code    Subjective:      no new events. Continues to have dyspnea and cough.     Physical Examination:      Vitals:  /61   Pulse 97   Temp 97.7 °F (36.5 °C) (Oral)   Resp 18   Ht 5' 5\" (1.651 m)   Wt 200 lb (90.7 kg)   SpO2 98%   BMI 33.28 kg/m²   Temp (24hrs), Av.8 °F (36.6 °C), Min:97.7 °F (36.5 °C), Max:97.8 °F (36.6 °C)      General appearance: alert, cooperative and no distress  Mental Status: oriented to person, place and time and normal affect  Lungs: diminished bilaterally, normal effort  Heart: regular rate and rhythm, no murmur  Abdomen: soft, nontender, nondistended, bowel sounds present, no masses  Extremities: no edema, redness, tenderness in the calves  Skin: no gross lesions, rashes    Data:     Labs:  Recent Labs     20  0521 20  0528   WBC 9.3 9.3   HGB 11.5* 11.7*    286     Recent Labs     20  0520 20  0528    145*   K 4.4 3.9   CL 99 98   CO2 33* 37*   BUN 16 20   CREATININE 0.35* 0.39*   GLUCOSE 127* 98     Recent Labs     20  1137   AST 13   ALT 13   BILITOT 0.3   ALKPHOS 85       Current Facility-Administered Medications   Medication Dose Route Frequency Provider Last Rate Last Dose    metoprolol tartrate (LOPRESSOR) tablet 50 mg  50 mg Oral BID Nicolle Uriarte MD   50 mg at 20 09    aspirin EC tablet 81 mg  81 mg Oral Daily Nicolle Uriarte MD   81 mg at 20 09    pantoprazole (PROTONIX) tablet 40 mg  40 mg Oral QAM AC Nicolle Uriarte MD   40 mg at 20 0503    atorvastatin (LIPITOR) tablet 20 mg  20 mg Oral Nightly Nicolle Uriarte MD   20 mg at 20    predniSONE (DELTASONE) tablet 40 mg  40 mg Oral Daily Denise Vines MD   40 mg at 20 0944    budesonide (PULMICORT) nebulizer suspension 500 mcg  0.5 mg Nebulization BID

## 2020-02-06 NOTE — PROGRESS NOTES
Respiratory: Positive for shortness of breath and wheezing. Negative for cough, chest tightness and stridor. Cardiovascular: Negative. Negative for chest pain, palpitations and leg swelling. Gastrointestinal: Negative. Negative for blood in stool and nausea. Genitourinary: Negative. Musculoskeletal: Positive for arthralgias and gait problem. Skin: Negative. Neurological: Positive for weakness. Negative for dizziness, syncope and light-headedness. Hematological: Negative. Psychiatric/Behavioral: Negative. Physical Examination:    /61   Pulse 97   Temp 97.7 °F (36.5 °C) (Oral)   Resp 18   Ht 5' 5\" (1.651 m)   Wt 200 lb (90.7 kg)   SpO2 98%   BMI 33.28 kg/m²    Physical Exam   Constitutional: No distress. She appears chronically ill and acutely ill. HENT:   Normal cephalic and Atraumatic   Eyes: Pupils are equal, round, and reactive to light. Neck: Normal range of motion and thyroid normal. Neck supple. No JVD present. No neck adenopathy. No thyromegaly present. Cardiovascular: Regular rhythm, intact distal pulses and normal pulses. Tachycardia present. Murmur heard. Pulmonary/Chest: Effort normal. She has no rales. She has diffuse wheezes. She exhibits no tenderness. Abdominal: Soft. Bowel sounds are normal. There is no abdominal tenderness. Musculoskeletal: Normal range of motion. General: No tenderness or edema. Neurological: She is alert and oriented to person, place, and time. Skin: Skin is warm. No cyanosis. Nails show no clubbing.        LABS:  CBC:   Lab Results   Component Value Date    WBC 9.3 02/06/2020    RBC 3.58 02/06/2020    HGB 11.7 02/06/2020    HCT 35.7 02/06/2020    MCV 99.9 02/06/2020    MCH 32.8 02/06/2020    MCHC 32.8 02/06/2020    RDW 13.0 02/06/2020     02/06/2020     CBC with Differential:    Lab Results   Component Value Date    WBC 9.3 02/06/2020    RBC 3.58 02/06/2020    HGB 11.7 02/06/2020    HCT 35.7 02/06/2020

## 2020-02-06 NOTE — PROGRESS NOTES
BILITOT 0.3  --   --   --    ALKPHOS 85  --   --   --    AST 13  --   --   --    ALT 13  --   --   --    LABGLOM >60.0   < > >60.0 >60.0   GFRAA >60.0   < > >60.0 >60.0   GLOB 3.7*  --   --   --     < > = values in this interval not displayed. MV Settings:          Recent Labs     02/04/20 2123   PHART 7.388   IMN0LUO 57*   PO2ART 74*   VNV9LRH 34.4*   BEART 9*   C6HDSSSO 94*       O2 Device: Nasal cannula  O2 Flow Rate (L/min): 4 L/min    DIET CARDIAC;     MEDICATIONS during current hospitalization:    Continuous Infusions:    Scheduled Meds:   metoprolol tartrate  50 mg Oral BID    aspirin  81 mg Oral Daily    pantoprazole  40 mg Oral QAM AC    atorvastatin  20 mg Oral Nightly    predniSONE  40 mg Oral Daily    budesonide  0.5 mg Nebulization BID    sodium chloride flush  10 mL Intravenous 2 times per day    enoxaparin  40 mg Subcutaneous Daily    azithromycin  500 mg Intravenous Q24H    ipratropium-albuterol  1 ampule Inhalation 4x daily       PRN Meds:sodium chloride flush, magnesium hydroxide, ondansetron, albuterol, acetaminophen    Radiology  Ct Head Wo Contrast    Result Date: 2/4/2020  EXAMINATION: CT HEAD WO CONTRAST  DATE AND TIME:2/4/2020 11:30 AM CLINICAL HISTORY: Severe headache.  fall hit head  COMPARISON: None TECHNIQUE:Axial CT from skull base to vertex without  contrast..  All CT scans at this facility use dose modulation, iterative reconstruction, and/or weight based dosing when appropriate to reduce radiation dose to as low as reasonably achievable. FINDINGS CSF spaces: Ventricles are normal in size and position. Sulci are appropriate for age. Brain parenchyma: There is no parenchymal mass, or mass effect signs of acute infarct, acute hemorrhage or extra-axial fluid. There are a few scattered hyperintensity in the periventricular white matter that can be related to microvascular disease. This  can also be seen as part of normal aging.  Hypodense area in the left subinsular area likely the left temporal tip versus small remote ischemic focus. Skull base: The bony calvarium and skull base are normal.   The visualized paranasal sinuses and mastoids are clear. Age-related changes. Nothing acute intracranially. Cta Chest W Wo  (pe Study)    Result Date: 2/4/2020  EXAM: CT SCAN OF THE THORAX WITH CONTRAST/PE PROTOCOL/CTA CHEST COMPARISON: CHEST RADIOGRAPH, FEBRUARY 4, 2020, JULY 31, 2019. REASON FOR EXAMINATION:  SHORTNESS OF BREATH. PNEUMONIA VERSUS PULMONARY EMBOLISM. TECHNIQUE: Helical CTA was performed through the chest utilizing 100 mL of Isovue 370 intravenous contrast.  Images were obtained with bolus tracking in order to opacify the pulmonary arteries. Thick section coronal MIP 3D reconstructions were performed  on a separate workstation. FINDINGS:  No intraluminal filling defects, pulmonary arterial tree. Cardiac size enlarged. No pericardial effusion. Ascending thoracic aorta measures 4.0 x 3.7 cm, pulmonary arterial bifurcation. Multiple lymph nodes identified within aortopulmonary window, pretracheal space, and precarinal space, as well as subcarinal space, measuring between 8 and 12 mm. Right hilar lymph node identified measuring 12 mm. Normal size lymph nodes left hilum. No axillary lymph node enlargement Right lung shows emphysematous change. Minimal dependent subsegmental atelectatic change right upper lobe marginated by major fissure. Scarring anterior right upper lobe. Trace dependent atelectatic change. 1.4 x 1.7 cm pleural-based noncalcified nodular  area, right posterior lung base (series 2, image 96). No pleural effusion. Left lung shows diffuse emphysematous change. Mild dependent subsegmental atelectatic change left upper lobe, lingula, and greatest at left lung base. No nodules or masses. No pleural effusion. Limited imaging upper abdomen shows adrenal glands without anomaly. 3.2 cm cyst, posterior left upper pole kidney.  Remote T12

## 2020-02-07 LAB
ANION GAP SERPL CALCULATED.3IONS-SCNC: 9 MEQ/L (ref 9–15)
BUN BLDV-MCNC: 16 MG/DL (ref 8–23)
CALCIUM SERPL-MCNC: 9.2 MG/DL (ref 8.5–9.9)
CHLORIDE BLD-SCNC: 97 MEQ/L (ref 95–107)
CO2: 36 MEQ/L (ref 20–31)
CREAT SERPL-MCNC: 0.43 MG/DL (ref 0.5–0.9)
GFR AFRICAN AMERICAN: >60
GFR NON-AFRICAN AMERICAN: >60
GLUCOSE BLD-MCNC: 93 MG/DL (ref 70–99)
HCT VFR BLD CALC: 36 % (ref 37–47)
HEMOGLOBIN: 11.7 G/DL (ref 12–16)
MCH RBC QN AUTO: 32.5 PG (ref 27–31.3)
MCHC RBC AUTO-ENTMCNC: 32.6 % (ref 33–37)
MCV RBC AUTO: 99.8 FL (ref 82–100)
PDW BLD-RTO: 13.1 % (ref 11.5–14.5)
PLATELET # BLD: 280 K/UL (ref 130–400)
POTASSIUM REFLEX MAGNESIUM: 3.8 MEQ/L (ref 3.4–4.9)
RBC # BLD: 3.61 M/UL (ref 4.2–5.4)
SODIUM BLD-SCNC: 142 MEQ/L (ref 135–144)
URINE CULTURE, ROUTINE: NORMAL
WBC # BLD: 6.5 K/UL (ref 4.8–10.8)

## 2020-02-07 PROCEDURE — 6370000000 HC RX 637 (ALT 250 FOR IP): Performed by: INTERNAL MEDICINE

## 2020-02-07 PROCEDURE — 94664 DEMO&/EVAL PT USE INHALER: CPT

## 2020-02-07 PROCEDURE — 6370000000 HC RX 637 (ALT 250 FOR IP): Performed by: NURSE PRACTITIONER

## 2020-02-07 PROCEDURE — 6360000002 HC RX W HCPCS: Performed by: NURSE PRACTITIONER

## 2020-02-07 PROCEDURE — 80048 BASIC METABOLIC PNL TOTAL CA: CPT

## 2020-02-07 PROCEDURE — 6360000002 HC RX W HCPCS: Performed by: INTERNAL MEDICINE

## 2020-02-07 PROCEDURE — 93010 ELECTROCARDIOGRAM REPORT: CPT | Performed by: INTERNAL MEDICINE

## 2020-02-07 PROCEDURE — 94761 N-INVAS EAR/PLS OXIMETRY MLT: CPT

## 2020-02-07 PROCEDURE — 99233 SBSQ HOSP IP/OBS HIGH 50: CPT | Performed by: INTERNAL MEDICINE

## 2020-02-07 PROCEDURE — 97535 SELF CARE MNGMENT TRAINING: CPT

## 2020-02-07 PROCEDURE — 1210000000 HC MED SURG R&B

## 2020-02-07 PROCEDURE — 94640 AIRWAY INHALATION TREATMENT: CPT

## 2020-02-07 PROCEDURE — 85027 COMPLETE CBC AUTOMATED: CPT

## 2020-02-07 PROCEDURE — 94668 MNPJ CHEST WALL SBSQ: CPT

## 2020-02-07 PROCEDURE — 36415 COLL VENOUS BLD VENIPUNCTURE: CPT

## 2020-02-07 PROCEDURE — 2580000003 HC RX 258: Performed by: NURSE PRACTITIONER

## 2020-02-07 PROCEDURE — 2700000000 HC OXYGEN THERAPY PER DAY

## 2020-02-07 RX ORDER — IPRATROPIUM BROMIDE AND ALBUTEROL SULFATE 2.5; .5 MG/3ML; MG/3ML
1 SOLUTION RESPIRATORY (INHALATION) 3 TIMES DAILY
Status: DISCONTINUED | OUTPATIENT
Start: 2020-02-07 | End: 2020-02-10 | Stop reason: HOSPADM

## 2020-02-07 RX ORDER — FUROSEMIDE 10 MG/ML
20 INJECTION INTRAMUSCULAR; INTRAVENOUS ONCE
Status: COMPLETED | OUTPATIENT
Start: 2020-02-07 | End: 2020-02-07

## 2020-02-07 RX ADMIN — BUDESONIDE 500 MCG: 0.5 SUSPENSION RESPIRATORY (INHALATION) at 21:33

## 2020-02-07 RX ADMIN — ASPIRIN 81 MG: 81 TABLET, COATED ORAL at 08:33

## 2020-02-07 RX ADMIN — BUDESONIDE 500 MCG: 0.5 SUSPENSION RESPIRATORY (INHALATION) at 07:47

## 2020-02-07 RX ADMIN — IPRATROPIUM BROMIDE AND ALBUTEROL SULFATE 1 AMPULE: .5; 3 SOLUTION RESPIRATORY (INHALATION) at 07:46

## 2020-02-07 RX ADMIN — ENOXAPARIN SODIUM 40 MG: 40 INJECTION SUBCUTANEOUS at 08:33

## 2020-02-07 RX ADMIN — ACETAMINOPHEN 650 MG: 325 TABLET ORAL at 21:14

## 2020-02-07 RX ADMIN — ATORVASTATIN CALCIUM 20 MG: 20 TABLET, FILM COATED ORAL at 21:14

## 2020-02-07 RX ADMIN — Medication 10 ML: at 08:37

## 2020-02-07 RX ADMIN — PREDNISONE 40 MG: 20 TABLET ORAL at 08:33

## 2020-02-07 RX ADMIN — METOPROLOL TARTRATE 50 MG: 50 TABLET, FILM COATED ORAL at 08:37

## 2020-02-07 RX ADMIN — IPRATROPIUM BROMIDE AND ALBUTEROL SULFATE 1 AMPULE: .5; 3 SOLUTION RESPIRATORY (INHALATION) at 21:33

## 2020-02-07 RX ADMIN — PANTOPRAZOLE SODIUM 40 MG: 40 TABLET, DELAYED RELEASE ORAL at 05:25

## 2020-02-07 RX ADMIN — FUROSEMIDE 20 MG: 10 INJECTION, SOLUTION INTRAMUSCULAR; INTRAVENOUS at 12:53

## 2020-02-07 RX ADMIN — ACETAMINOPHEN 650 MG: 325 TABLET ORAL at 08:43

## 2020-02-07 RX ADMIN — Medication 10 ML: at 21:15

## 2020-02-07 RX ADMIN — IPRATROPIUM BROMIDE AND ALBUTEROL SULFATE 1 AMPULE: .5; 3 SOLUTION RESPIRATORY (INHALATION) at 12:00

## 2020-02-07 RX ADMIN — AZITHROMYCIN DIHYDRATE 500 MG: 500 INJECTION, POWDER, LYOPHILIZED, FOR SOLUTION INTRAVENOUS at 18:04

## 2020-02-07 ASSESSMENT — PAIN SCALES - GENERAL
PAINLEVEL_OUTOF10: 8
PAINLEVEL_OUTOF10: 3

## 2020-02-07 ASSESSMENT — PAIN DESCRIPTION - LOCATION: LOCATION: HEAD

## 2020-02-07 ASSESSMENT — PAIN DESCRIPTION - PAIN TYPE: TYPE: ACUTE PAIN

## 2020-02-07 NOTE — CARE COORDINATION
Care Transition Nurse provided COPD booklet and zones sheet and reviewed. Stressed importance of phoning physician promptly for symptoms in the yellow zone and ems for symptoms in the red zone. Discussed cause of COPD, how lungs work, avoiding respiratory infection, good handwashing, avoid sick contacts,keeping nebulizer set up clean, avoiding inhaled irritants, use of masks to protect airway, breathing exercises, continue to use incentive spirometer and acapella at home, positions to reduce sob, energy conservation, copd medications, take antibiotics and prednisone (if ordered) until gone, vaccines, drinking plenty of fluids, nutrition, limit sodium,  pulmonary rehab. Patient is a former smoker. She quit smoking in the 1970's but had smoked since the age of 13. She states she was symptomatic for one month before coming to the hospital. She states she did not call the doctor because Dr. Supriya Acevedo retired. Informed her that there is probably a doctor replacing Dr. Supriya Acevedo and she will need to follow up with him/her. Stressed importance of not waiting to seek medical attention. Advised that if treatment can be started promptly then a hospital admission may be avoided. Stressed  importance of physician follow up within one week of discharge and follow up with pulmonology as directed. Patient voiced understanding.

## 2020-02-07 NOTE — PROGRESS NOTES
No    ASSESSMENT   Body structures, Functions, Activity limitations: Decreased functional mobility ; Decreased safe awareness;Decreased balance;Decreased endurance;Decreased strength  Assessment: patient demonstrates mild/ moderate dyspnea upon exertion. Maintains SPO2 93-97% throughout session. Continues to be limited by quick fatigue/ weakness. Patient reports she was previously ambulating from recliner to bathroom at home. Unable to take > 2-3 steps before requiring seated RB this date. Prognosis: Good  REQUIRES PT FOLLOW UP: Yes     Discharge Recommendations:  Patient would benefit from continued therapy after discharge    Goals  Long term goals  Long term goal 1: supervision with bed mobility  Long term goal 2: supervision with transfers  Long term goal 3: pt to ambulate >50 ft with supervision [employer]  Long term goal 4: pt to navigate 3 steps with CGA  Patient Goals   Patient goals : to go home soon    PLAN    Plan  Times per week: 3-6  Current Treatment Recommendations: Strengthening;Transfer Training; Endurance Training;Neuromuscular Re-education;Equipment Evaluation, Education, & procurement;Balance Training;Gait Training;Home Exercise Program;Functional Mobility Training; Safety Education & Training  Safety Devices  Type of devices: Call light within reach; All fall risk precautions in place; Left in bed;Bed alarm in place     [facility] (6 CLICK) BASIC MOBILITY  AM-PAC Inpatient Mobility Raw Score : 13     Therapy Time   Individual   Time In 1037   Time Out 1100   Minutes 23     Timed Code Treatment Minutes: 23 Minutes     Gt 10  Tr 1505 [facility], 02/07/20 at 11:35 AM

## 2020-02-07 NOTE — DISCHARGE INSTR - COC
Continuity of Care Form    Patient Name: Cassie Martinez   :  1934  MRN:  [id number]    Admit date:  2020  Discharge date:  2-10-20    Code Status Order: Full Code   Advance Directives:   Advance Care Flowsheet Documentation     Date/Time Healthcare Directive Type of Healthcare Directive Copy in [address] Agent's Name Healthcare Agent's Phone Number    20 1025  Yes, patient has an advance directive for healthcare treatment  Health care treatment directive; Living will  No, copy requested from family  Adult siblings   Andrae  [phone]          Admitting Physician:  Jennifer Lynn MD  PCP: Wilber Whatley DO    Discharging Nurse: [facility] Unit/Room#: D934/N079-62  Discharging Unit Phone Number: [phone]    Emergency Contact:   Extended Emergency Contact Information  Primary Emergency Contact: Naomi [place]   [address] Phone: [phone]  Mobile Phone: [phone]  Relation: Brother/Sister    Past Surgical History:  Past Surgical History:   Procedure Laterality Date    BACK SURGERY         Immunization History:   Immunization History   Administered Date(s) Administered    Influenza, Quadv, 6 mo and older, IM, PF (Flulaval, Fluarix) 2019    Pneumococcal Conjugate 13-valent ([place]) 2019       Active Problems:  Patient Active Problem List   Diagnosis Code    Pneumonia J18.9    [facility] J44.1       Isolation/Infection:   Isolation          No Isolation        Patient Infection Status     None to display          Nurse Assessment:  Last Vital Signs: /68   Pulse 92   Temp 97.9 °F (36.6 °C) (Oral)   Resp 20   Ht 5' 5\" (1.651 m)   Wt 200 lb (90.7 kg)   SpO2 91%   BMI 33.28 kg/m²     Last documented pain score (0-10 scale): Pain Level: 8  Last Weight:   Wt Readings from Last 1 Encounters:   20 200 lb (90.7 kg)     Mental Status:  oriented and alert    IV Access:  - None    Nursing Mobility/ADLs:  Walking   Assisted  Transfer  Assisted 2 assist  Bathing  Assisted  Dressing  Assisted  Toileting  Assisted  Feeding  Independent  Med Admin  Assisted  Med Delivery   whole    Wound Care Documentation and Therapy:        Elimination:  Continence:   · Bowel: Yes  · Bladder: Yes  Urinary Catheter: None   Colostomy/Ileostomy/Ileal Conduit: No       Date of Last BM: 2/8/20    Intake/Output Summary (Last 24 hours) at 2/7/2020 1258  Last data filed at 2/7/2020 1255  Gross per 24 hour   Intake 600 ml   Output --   Net 600 ml     I/O last 3 completed shifts: In: 480 [P.O.:480]  Out: 700 [Urine:700]    Safety Concerns: At Risk for Falls    Impairments/Disabilities:      None    Nutrition Therapy:  Current Nutrition Therapy:   - Oral Diet:  Cardiac    Routes of Feeding: Oral  Liquids: Thin Liquids  Daily Fluid Restriction: no  Last Modified Barium Swallow with Video (Video Swallowing Test): not done    Treatments at the Time of Hospital Discharge:   Respiratory Treatments: ***  Oxygen Therapy:  is on oxygen at 2 L/min per nasal cannula.   Ventilator:    - No ventilator support    Rehab Therapies: Physical Therapy and Occupational Therapy  Weight Bearing Status/Restrictions: No weight bearing restirctions  Other Medical Equipment (for information only, NOT a DME order):  walker and bedside commode  Other Treatments: ***    Patient's personal belongings (please select all that are sent with patient):  Fox    RN SIGNATURE:  Electronically signed by Giovany Graham RN on 2/10/20 at 5:25 PM    CASE MANAGEMENT/SOCIAL WORK SECTION    Inpatient Status Date: ***    Readmission Risk Assessment Score:  Readmission Risk              Risk of Unplanned Readmission:        11           Discharging to Facility/ Agency   · Name: [facility]  · Address:  · Phone:[phone]  · Fax:    Dialysis Facility (if applicable)   · Name:  · Address:  · Dialysis Schedule:  · Phone:  · Fax:    /Social

## 2020-02-07 NOTE — PROGRESS NOTES
also be seen as part of normal aging. Hypodense area in the left subinsular area likely the left temporal tip versus small remote ischemic focus. Skull base: The bony calvarium and skull base are normal.   The visualized paranasal sinuses and mastoids are clear. Age-related changes. Nothing acute intracranially. Cta Chest W Wo  (pe Study)    Result Date: 2/4/2020  EXAM: CT SCAN OF THE THORAX WITH CONTRAST/PE PROTOCOL/CTA CHEST COMPARISON: CHEST RADIOGRAPH, FEBRUARY 4, 2020, JULY 31, 2019. REASON FOR EXAMINATION:  SHORTNESS OF BREATH. PNEUMONIA VERSUS PULMONARY EMBOLISM. TECHNIQUE: Helical CTA was performed through the chest utilizing 100 mL of Isovue 370 intravenous contrast.  Images were obtained with bolus tracking in order to opacify the pulmonary arteries. Thick section coronal MIP 3D reconstructions were performed  on a separate workstation. FINDINGS:  No intraluminal filling defects, pulmonary arterial tree. Cardiac size enlarged. No pericardial effusion. Ascending thoracic aorta measures 4.0 x 3.7 cm, pulmonary arterial bifurcation. Multiple lymph nodes identified within aortopulmonary window, pretracheal space, and precarinal space, as well as subcarinal space, measuring between 8 and 12 mm. Right hilar lymph node identified measuring 12 mm. Normal size lymph nodes left hilum. No axillary lymph node enlargement Right lung shows emphysematous change. Minimal dependent subsegmental atelectatic change right upper lobe marginated by major fissure. Scarring anterior right upper lobe. Trace dependent atelectatic change. 1.4 x 1.7 cm pleural-based noncalcified nodular  area, right posterior lung base (series 2, image 96). No pleural effusion. Left lung shows diffuse emphysematous change. Mild dependent subsegmental atelectatic change left upper lobe, lingula, and greatest at left lung base. No nodules or masses. No pleural effusion. Limited imaging upper abdomen shows adrenal glands without anomaly.  3.2 cm cyst, posterior left upper pole kidney. Remote T12 kyphoplasty. No osteoblastic, and no osteolytic lesions. No CT evidence pulmonary embolism. Diffuse emphysema. Bilateral multifocal areas of atelectatic changes described. Probable nodular focus of atelectasis, posterior left lung base. If clinical concern warrants, follow-up CT imaging in 3 months following treatment may be utilized to demonstrate resolution of finding and rule out underlying malignancy. Ascending aortic ectasia. Multiple borderline lymph nodes as discussed, and mediastinum and right hilum, most likely reactive in etiology. Other findings discussed. All CT scans at this facility use dose modulation, iterative reconstruction, and/or weight based dosing when appropriate to reduce radiation dose to as low as reasonably achievable. Xr Chest Portable    Result Date: 2/4/2020  EXAMINATION: XR CHEST PORTABLE CLINICAL HISTORY: SHORTNESS OF BREATH COMPARISONS: CHEST RADIOGRAPHS, JULY 31, 2019, JANUARY 2, 2019 FINDINGS: Osseous structures intact. Cardiopericardial silhouette is normal. Pulmonary vasculature is normal. Ill-defined area of increased opacity visualized laterally left mid, and left lower lung. Right lung clear.      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

## 2020-02-07 NOTE — CARE COORDINATION
The LSW met with the patient. The patient has a SNF list and the LSW discussed freedom of choice. The patient states she is agreeable to SNF. The patient asked the LSW to call her sister, Franchesca Wilkinson. Franchesca Mom states she will talk with her daughter in law (RN) regarding SNF choices. The patient's sister will call the LSW back. The patient was present for the conversation by telephone with her sister and LSW. RN updated. Electronically signed by JOSEFINA Rhodes on 2/7/20 at 12:18 PM    [facility] called the LSW and states they would like [facility] [address]. Per Dianne Sevilla, admissions at [facility] [address], they do not take the [employer]. The LSW left a message for [facility], admissions [address].  Electronically signed by Dean Ray on 2/7/20 at 1:05 PM

## 2020-02-07 NOTE — PLAN OF CARE
Problem: Falls - Risk of:  Goal: Will remain free from falls  Description  Will remain free from falls  Outcome: Ongoing  Goal: Absence of physical injury  Description  Absence of physical injury  Outcome: Ongoing     Problem: Risk for Impaired Skin Integrity  Goal: Tissue integrity - skin and mucous membranes  Description  Structural intactness and normal physiological function of skin and  mucous membranes. Outcome: Ongoing     Problem: IP BALANCE  Goal: LTG - patient will maintain standing balance to allow for completion of daily activities  Outcome: Ongoing     Problem: Mobility - Impaired:  Goal: Mobility will improve  Description  Therapy evaluation completed. Please see daily notes and/or progress notes for details related to planned treatment interventions, goals and functional abilities. Outcome: Ongoing     Problem: Pain:  Goal: Pain level will decrease  Description  Pain level will decrease  Outcome: Ongoing  Goal: Control of acute pain  Description  Control of acute pain  Outcome: Ongoing  Goal: Control of chronic pain  Description  Control of chronic pain  Outcome: Ongoing     Problem: Discharge Planning:  Goal: Discharged to appropriate level of care  Description  Discharged to appropriate level of care  Outcome: Ongoing     Problem:  Activity Intolerance:  Goal: Ability to tolerate increased activity will improve  Description  Ability to tolerate increased activity will improve  Outcome: Ongoing     Problem: Gas Exchange - Impaired:  Goal: Levels of oxygenation will improve  Description  Levels of oxygenation will improve  Outcome: Ongoing

## 2020-02-07 NOTE — PROGRESS NOTES
KAMERON Randolph MD   20 mg at 02/06/20 2122    predniSONE (DELTASONE) tablet 40 mg  40 mg Oral Daily Karma Roche MD   40 mg at 02/07/20 0833    budesonide (PULMICORT) nebulizer suspension 500 mcg  0.5 mg Nebulization BID Karma Roche MD   500 mcg at 02/07/20 0747    sodium chloride flush 0.9 % injection 10 mL  10 mL Intravenous 2 times per day Rogelio Meline, APRN - CNP   10 mL at 02/07/20 0837    sodium chloride flush 0.9 % injection 10 mL  10 mL Intravenous PRN Rogelio Meline, APRN - CNP        magnesium hydroxide (MILK OF MAGNESIA) 400 MG/5ML suspension 30 mL  30 mL Oral Daily PRN Rogelio Meline, APRN - CNP        ondansetron (ZOFRAN) injection 4 mg  4 mg Intravenous Q6H PRN Rogelio Meline, APRN - CNP        enoxaparin (LOVENOX) injection 40 mg  40 mg Subcutaneous Daily Jeniffer Méndez, APRN - CNP   40 mg at 02/07/20 0833    albuterol (PROVENTIL) nebulizer solution 2.5 mg  2.5 mg Nebulization Q2H PRN Rogelio Meline, APRN - CNP   2.5 mg at 02/04/20 1937    acetaminophen (TYLENOL) tablet 650 mg  650 mg Oral Q4H PRN Rogelio Meline, APRN - CNP   650 mg at 02/07/20 0843    azithromycin (ZITHROMAX) 500 mg in D5W 250ml addavial  500 mg Intravenous Q24H Rogelio Meline, APRN - CNP   Stopped at 02/06/20 1723     Assessment and Plan: Active problems:    #  COPD exacerbation  -Resume steroid, nebulizers and azithromycin. Pulmonary consulted. She is on oxygen at home. -Rapid influenza test is negative. #Sinus tachycardia due to above-improved monitor on telemetry. Cardiology seen already. #Ascending aortic ectasia-seen on CT PE.  -Blood pressure stable. Echo pending. Check the diameter as per cardiology.     #Deconditioning  -PT/OT         DISCHARGE PLANNING  Pending SNF      Electronically signed by Contreras Lucas DO on 2/7/2020 at 12:48 PM

## 2020-02-07 NOTE — PROGRESS NOTES
Physician Progress Note    2020   11:34 AM    Name:  Mario Brunson  MRN:    [id number]     IP Day: 3     Admit Date: 2020 10:19 AM  PCP: Pilo Soria DO    Code Status:  Full Code    Subjective:      no new events. Continues to have dyspnea and cough.     Physical Examination:      Vitals:  /85   Pulse 118   Temp 97.9 °F (36.6 °C) (Oral)   Resp 20   Ht 5' 5\" (1.651 m)   Wt 200 lb (90.7 kg)   SpO2 91%   BMI 33.28 kg/m²   Temp (24hrs), Av.9 °F (36.6 °C), Min:97.8 °F (36.6 °C), Max:98 °F (36.7 °C)      General appearance: alert, cooperative and no distress  Mental Status: oriented to person, place and time and normal affect  Lungs: diminished bilaterally, normal effort  Heart: regular rate and rhythm, no murmur  Abdomen: soft, nontender, nondistended, bowel sounds present, no masses  Extremities: no edema, redness, tenderness in the calves  Skin: no gross lesions, rashes    Data:     Labs:  Recent Labs     20  0528 20  0513   WBC 9.3 6.5   HGB 11.7* 11.7*    280     Recent Labs     20  0528 20  0513   * 142   K 3.9 3.8   CL 98 97   CO2 37* 36*   BUN 20 16   CREATININE 0.39* 0.43*   GLUCOSE 98 93     Recent Labs     20  1137   AST 13   ALT 13   BILITOT 0.3   ALKPHOS 85       Current Facility-Administered Medications   Medication Dose Route Frequency Provider Last Rate Last Dose    ipratropium-albuterol (DUONEB) nebulizer solution 1 ampule  1 ampule Inhalation TID Hector Lee DO   1 ampule at 20 0746    furosemide (LASIX) injection 20 mg  20 mg Intravenous Once Carlos Avalos MD        metoprolol tartrate (LOPRESSOR) tablet 50 mg  50 mg Oral BID Bob Neville MD   50 mg at 20 4599    aspirin EC tablet 81 mg  81 mg Oral Daily Bob Neville MD   81 mg at 20 6792    pantoprazole (PROTONIX) tablet 40 mg  40 mg Oral QAM KARYN Neville MD   40 mg at 20 0525    atorvastatin (LIPITOR) tablet 20 mg  20 mg Oral Nightly Brittanie Moseley

## 2020-02-08 LAB
ANION GAP SERPL CALCULATED.3IONS-SCNC: 13 MEQ/L (ref 9–15)
BUN BLDV-MCNC: 15 MG/DL (ref 8–23)
CALCIUM SERPL-MCNC: 9.6 MG/DL (ref 8.5–9.9)
CHLORIDE BLD-SCNC: 100 MEQ/L (ref 95–107)
CO2: 36 MEQ/L (ref 20–31)
CREAT SERPL-MCNC: 0.45 MG/DL (ref 0.5–0.9)
GFR AFRICAN AMERICAN: >60
GFR NON-AFRICAN AMERICAN: >60
GLUCOSE BLD-MCNC: 91 MG/DL (ref 70–99)
HCT VFR BLD CALC: 36.7 % (ref 37–47)
HEMOGLOBIN: 12.1 G/DL (ref 12–16)
MCH RBC QN AUTO: 33.3 PG (ref 27–31.3)
MCHC RBC AUTO-ENTMCNC: 33.1 % (ref 33–37)
MCV RBC AUTO: 100.7 FL (ref 82–100)
PDW BLD-RTO: 13.5 % (ref 11.5–14.5)
PLATELET # BLD: 288 K/UL (ref 130–400)
POTASSIUM REFLEX MAGNESIUM: 4.1 MEQ/L (ref 3.4–4.9)
RBC # BLD: 3.65 M/UL (ref 4.2–5.4)
SODIUM BLD-SCNC: 149 MEQ/L (ref 135–144)
WBC # BLD: 6 K/UL (ref 4.8–10.8)

## 2020-02-08 PROCEDURE — 36415 COLL VENOUS BLD VENIPUNCTURE: CPT

## 2020-02-08 PROCEDURE — 97535 SELF CARE MNGMENT TRAINING: CPT

## 2020-02-08 PROCEDURE — 80048 BASIC METABOLIC PNL TOTAL CA: CPT

## 2020-02-08 PROCEDURE — 1210000000 HC MED SURG R&B

## 2020-02-08 PROCEDURE — 2700000000 HC OXYGEN THERAPY PER DAY

## 2020-02-08 PROCEDURE — 6360000002 HC RX W HCPCS: Performed by: INTERNAL MEDICINE

## 2020-02-08 PROCEDURE — 94668 MNPJ CHEST WALL SBSQ: CPT

## 2020-02-08 PROCEDURE — 94640 AIRWAY INHALATION TREATMENT: CPT

## 2020-02-08 PROCEDURE — 99232 SBSQ HOSP IP/OBS MODERATE 35: CPT | Performed by: INTERNAL MEDICINE

## 2020-02-08 PROCEDURE — 6370000000 HC RX 637 (ALT 250 FOR IP): Performed by: INTERNAL MEDICINE

## 2020-02-08 PROCEDURE — 94761 N-INVAS EAR/PLS OXIMETRY MLT: CPT

## 2020-02-08 PROCEDURE — 6360000002 HC RX W HCPCS: Performed by: NURSE PRACTITIONER

## 2020-02-08 PROCEDURE — 2580000003 HC RX 258: Performed by: NURSE PRACTITIONER

## 2020-02-08 PROCEDURE — 85027 COMPLETE CBC AUTOMATED: CPT

## 2020-02-08 RX ORDER — ATORVASTATIN CALCIUM 20 MG/1
20 TABLET, FILM COATED ORAL NIGHTLY
Qty: 30 TABLET | Refills: 3 | Status: SHIPPED | OUTPATIENT
Start: 2020-02-08

## 2020-02-08 RX ORDER — PREDNISONE 20 MG/1
40 TABLET ORAL DAILY
Qty: 4 TABLET | Refills: 0 | Status: SHIPPED | OUTPATIENT
Start: 2020-02-09 | End: 2020-02-11

## 2020-02-08 RX ORDER — ASPIRIN 81 MG/1
81 TABLET ORAL DAILY
Qty: 30 TABLET | Refills: 3 | Status: SHIPPED | OUTPATIENT
Start: 2020-02-09

## 2020-02-08 RX ORDER — METOPROLOL TARTRATE 50 MG/1
50 TABLET, FILM COATED ORAL 2 TIMES DAILY
Qty: 60 TABLET | Refills: 3 | Status: ON HOLD | OUTPATIENT
Start: 2020-02-08 | End: 2020-03-03 | Stop reason: HOSPADM

## 2020-02-08 RX ORDER — ACYCLOVIR 200 MG/1
500 CAPSULE ORAL DAILY
Status: DISCONTINUED | OUTPATIENT
Start: 2020-02-08 | End: 2020-02-10 | Stop reason: HOSPADM

## 2020-02-08 RX ADMIN — ASPIRIN 81 MG: 81 TABLET, COATED ORAL at 08:23

## 2020-02-08 RX ADMIN — BUDESONIDE 500 MCG: 0.5 SUSPENSION RESPIRATORY (INHALATION) at 07:48

## 2020-02-08 RX ADMIN — IPRATROPIUM BROMIDE AND ALBUTEROL SULFATE 1 AMPULE: .5; 3 SOLUTION RESPIRATORY (INHALATION) at 13:34

## 2020-02-08 RX ADMIN — Medication 10 ML: at 21:52

## 2020-02-08 RX ADMIN — IPRATROPIUM BROMIDE AND ALBUTEROL SULFATE 1 AMPULE: .5; 3 SOLUTION RESPIRATORY (INHALATION) at 19:21

## 2020-02-08 RX ADMIN — IPRATROPIUM BROMIDE AND ALBUTEROL SULFATE 1 AMPULE: .5; 3 SOLUTION RESPIRATORY (INHALATION) at 07:48

## 2020-02-08 RX ADMIN — PREDNISONE 40 MG: 20 TABLET ORAL at 08:22

## 2020-02-08 RX ADMIN — AZITHROMYCIN DIHYDRATE 500 MG: 500 INJECTION, POWDER, LYOPHILIZED, FOR SOLUTION INTRAVENOUS at 16:46

## 2020-02-08 RX ADMIN — BUDESONIDE 500 MCG: 0.5 SUSPENSION RESPIRATORY (INHALATION) at 19:21

## 2020-02-08 RX ADMIN — ATORVASTATIN CALCIUM 20 MG: 20 TABLET, FILM COATED ORAL at 21:52

## 2020-02-08 RX ADMIN — PANTOPRAZOLE SODIUM 40 MG: 40 TABLET, DELAYED RELEASE ORAL at 06:15

## 2020-02-08 RX ADMIN — Medication 10 ML: at 08:22

## 2020-02-08 RX ADMIN — ENOXAPARIN SODIUM 40 MG: 40 INJECTION SUBCUTANEOUS at 08:22

## 2020-02-08 RX ADMIN — ACYCLOVIR 600 MG: 200 CAPSULE ORAL at 18:04

## 2020-02-08 RX ADMIN — METOPROLOL TARTRATE 50 MG: 50 TABLET, FILM COATED ORAL at 08:23

## 2020-02-08 RX ADMIN — METOPROLOL TARTRATE 50 MG: 50 TABLET, FILM COATED ORAL at 21:51

## 2020-02-08 ASSESSMENT — PAIN SCALES - GENERAL
PAINLEVEL_OUTOF10: 0
PAINLEVEL_OUTOF10: 0

## 2020-02-08 NOTE — DISCHARGE SUMMARY
Hospital Medicine Discharge Summary    Lawerence Prader  :  1934  MRN:  [id number]    Admit date:  2020  Discharge date:  2020    Admitting Physician:  Chin Hwang MD  Primary Care Physician:  Angelita Johnson DO      Discharge Diagnoses: Active Problems:    COPD with acute exacerbation ([facility]  Resolved Problems:    * No resolved hospital problems. *    Chief Complaint   Patient presents with    Shortness of Breath    Fall     hit face, states no blood thinners     Hospital Course:   Lawerence Prader is a 80 y.o. female that was admitted and treated at [facility] for the following medical issues: Active Problems:    COPD with acute exacerbation ([facility]  Resolved Problems:    * No resolved hospital problems. *        ***  Pt was discharge in a stable condition. Exam on discharge: ***  /65   Pulse 100   Temp 97.7 °F (36.5 °C) (Oral)   Resp 16   Ht 5' 5\" (1.651 m)   Wt 200 lb (90.7 kg)   SpO2 94%   BMI 33.28 kg/m²   General appearance: No apparent distress, appears stated age and cooperative. HEENT: Pupils equal, round, and reactive to light. Conjunctivae/corneas clear. Neck: Supple, with full range of motion. No jugular venous distention. Trachea midline. Respiratory:  Normal respiratory effort. Clear to auscultation, bilaterally without Rales/Wheezes/Rhonchi. Cardiovascular: Regular rate and rhythm with normal S1/S2 without murmurs, rubs or gallops. Abdomen: Soft, non-tender, non-distended with normal bowel sounds. Musculoskeletal: No clubbing, cyanosis or edema bilaterally. Full range of motion without deformity. Skin: Skin color, texture, turgor normal.  No rashes or lesions. Neuro: Non Focal. Symetrical motor and tone. Nl Comprehension, Alert,awake and oriented. NL CN. Symetrical tone and reflexes.   Psychiatric: Alert and oriented, thought content appropriate, normal insight  Capillary Refill: Brisk,< 3 seconds   Peripheral Pulses: +2 palpable, equal bilaterally     Patient was seen by the following consultants while admitted to [facility]:   Consults:  [address] TO PULMONOLOGY    Significant Diagnostic Studies:    BMP  Please refer to chart if no studies are shown here    Ct Head Wo Contrast    Result Date: 2/4/2020  EXAMINATION: CT HEAD WO CONTRAST  DATE AND TIME:2/4/2020 11:30 AM CLINICAL HISTORY: Severe headache.  fall hit head  COMPARISON: None TECHNIQUE:Axial CT from skull base to vertex without  contrast..  All CT scans at this facility use dose modulation, iterative reconstruction, and/or weight based dosing when appropriate to reduce radiation dose to as low as reasonably achievable. FINDINGS CSF spaces: Ventricles are normal in size and position. Sulci are appropriate for age. Brain parenchyma: There is no parenchymal mass, or mass effect signs of acute infarct, acute hemorrhage or extra-axial fluid. There are a few scattered hyperintensity in the periventricular white matter that can be related to microvascular disease. This  can also be seen as part of normal aging. Hypodense area in the left subinsular area likely the left temporal tip versus small remote ischemic focus. Skull base: The bony calvarium and skull base are normal.   The visualized paranasal sinuses and mastoids are clear. Age-related changes. Nothing acute intracranially. Cta Chest W Wo  (pe Study)    Result Date: 2/4/2020  EXAM: CT SCAN OF THE THORAX WITH CONTRAST/PE PROTOCOL/CTA CHEST COMPARISON: CHEST RADIOGRAPH, FEBRUARY 4, 2020, JULY 31, 2019. REASON FOR EXAMINATION:  SHORTNESS OF BREATH. PNEUMONIA VERSUS PULMONARY EMBOLISM. TECHNIQUE: Helical CTA was performed through the chest utilizing 100 mL of Isovue 370 intravenous contrast.  Images were obtained with bolus tracking in order to opacify the pulmonary arteries. Thick section coronal MIP 3D reconstructions were performed  on a separate workstation. 2/4/2020  EXAMINATION: XR CHEST PORTABLE CLINICAL HISTORY: SHORTNESS OF BREATH COMPARISONS: CHEST RADIOGRAPHS, JULY 31, 2019, JANUARY 2, 2019 FINDINGS: Osseous structures intact. Cardiopericardial silhouette is normal. Pulmonary vasculature is normal. Ill-defined area of increased opacity visualized laterally left mid, and left lower lung. Right lung clear. LEFT MID/LOWER LUNG ATELECTASIS/PNEUMONIA      Discharge Medications:       Diana Landeros   Home Medication Instructions [id number]    Printed on:02/08/20 1140   Medication Information                      albuterol (PROVENTIL) (2.5 MG/3ML) 0.083% nebulizer solution  Take 2.5 mg by nebulization every 4 hours as needed for Wheezing              aspirin 81 MG EC tablet  Take 1 tablet by mouth daily             atorvastatin (LIPITOR) 20 MG tablet  Take 1 tablet by mouth nightly             metoprolol tartrate (LOPRESSOR) 50 MG tablet  Take 1 tablet by mouth 2 times daily             prednisoLONE acetate (PRED FORTE) 1 % ophthalmic suspension  Place 1 drop into the right eye daily             predniSONE (DELTASONE) 20 MG tablet  Take 2 tablets by mouth daily for 2 days             valACYclovir (VALTREX) 500 MG tablet  Take 500 mg by mouth daily                 Disposition:   If discharged to Home, Any [place] [id number] needs that were indicated and/or required as been addressed and set up by Social Work. Condition at discharge: Pt was medically stable at the time of discharge. Activity: {discharge activity:20583}    Total time taken for discharging this patient: 40 minutes. Greater than 70% of time was spent focused exclusively on this patient. Time was taken to review chart, discuss plans with consultants, reconciling medications, discussing plan answering questions with patient.      Giancarlo Quinones  2/8/2020, 11:49 AM  ----------------------------------------------------------------------------------------------------------------------    Alfa Les,

## 2020-02-08 NOTE — PROGRESS NOTES
Physical Therapy Med Surg Daily Treatment Note  [facility]  [facility]       NAME: Jaz Reyes  : 1934 (80 y.o.)  MRN: [id number]  CODE STATUS: Full Code    Date of Service: 2020    Patient Diagnosis(es): COPD with acute exacerbation [facility]) [J44.1]   Chief Complaint   Patient presents with    Shortness of Breath    Fall     hit face, states no blood thinners     Patient Active Problem List    Diagnosis Date Noted    COPD with acute exacerbation ([facility] 2020    Pneumonia 2019        Past Medical History:   Diagnosis Date    COPD (chronic obstructive pulmonary disease) ([facility]      Past Surgical History:   Procedure Laterality Date    BACK SURGERY           Restrictions  Restrictions/Precautions: Isolation; Fall Risk    SUBJECTIVE   General  Chart Reviewed: Yes  Subjective  Subjective: \"I was in the chair for lunch but I can get back now. \"     Pre-Session Pain Report  Pre Treatment Pain Screening  Pain at present: 0  Scale Used: Numeric Score  Intervention List: Patient able to continue with treatment  Pain Screening  Patient Currently in Pain: No       Post-Session Pain Report  Pain Assessment  Pain Assessment: 0-10  Pain Level: 0         OBJECTIVE        Bed mobility  Supine to Sit: Moderate assistance  Sit to Supine: (DNT pt in chair.)  Comment: increased time and effort to perform. pt needing assistance with trunk control. Transfers  Sit to Stand: Minimal Assistance  Stand to sit: Minimal Assistance  Bed to Chair: Minimal assistance  Comment: patient requires cuing for hand placement to improve safety. Patient significantly retropulsive, vc's for anterior weight shift with poor follow through. Activity Tolerance  Activity Tolerance: Patient limited by endurance; Patient limited by fatigue          ASSESSMENT   Assessment: pt retropulsive with transfers, pt SOB with exertion.       Discharge

## 2020-02-08 NOTE — FLOWSHEET NOTE
0815- In bed resting, very little trace edema ankles not feet. Lung fields are diminished with inspiratory wheeze posterior. 0900-Assisted to bedside commode, voided clear yellow urine.

## 2020-02-08 NOTE — PROGRESS NOTES
Physician Progress Note    2020   11:48 AM    Name:  Shira Hernandez  MRN:    [id number]     IP Day: 4     Admit Date: 2020 10:19 AM  PCP: Facundo Henderson DO    Code Status:  Full Code    Subjective:      no new events. Continues to have dyspnea and cough. Physical Examination:      Vitals:  /65   Pulse 100   Temp 97.7 °F (36.5 °C) (Oral)   Resp 16   Ht 5' 5\" (1.651 m)   Wt 200 lb (90.7 kg)   SpO2 94%   BMI 33.28 kg/m²   Temp (24hrs), Av.6 °F (36.4 °C), Min:97.4 °F (36.3 °C), Max:97.7 °F (36.5 °C)      General appearance: alert, cooperative and no distress  Mental Status: oriented to person, place and time and normal affect  Lungs: diminished bilaterally, normal effort  Heart: regular rate and rhythm, no murmur  Abdomen: soft, nontender, nondistended, bowel sounds present, no masses  Extremities: no edema, redness, tenderness in the calves  Skin: no gross lesions, rashes    Data:     Labs:  Recent Labs     20  0513 20  0520   WBC 6.5 6.0   HGB 11.7* 12.1    288     Recent Labs     20  0513 20  0520    149*   K 3.8 4.1   CL 97 100   CO2 36* 36*   BUN 16 15   CREATININE 0.43* 0.45*   GLUCOSE 93 91     No results for input(s): AST, ALT, ALB, BILITOT, ALKPHOS in the last 72 hours.     Current Facility-Administered Medications   Medication Dose Route Frequency Provider Last Rate Last Dose    ipratropium-albuterol (DUONEB) nebulizer solution 1 ampule  1 ampule Inhalation TID Jennifer Robles DO   1 ampule at 20 0748    metoprolol tartrate (LOPRESSOR) tablet 50 mg  50 mg Oral BID Curley Soulier, MD   50 mg at 20 8603    aspirin EC tablet 81 mg  81 mg Oral Daily Curley Soulier, MD   81 mg at 20 0366    pantoprazole (PROTONIX) tablet 40 mg  40 mg Oral QAM  Curley Soulier, MD   40 mg at 20 0615    atorvastatin (LIPITOR) tablet 20 mg  20 mg Oral Nightly Curley Soulier, MD   20 mg at 20    predniSONE (DELTASONE) tablet 40 mg  40 mg Oral Daily Balwinder Lama MD   40 mg at 02/08/20 3914    budesonide (PULMICORT) nebulizer suspension 500 mcg  0.5 mg Nebulization BID Balwinder Lama MD   500 mcg at 02/08/20 0748    sodium chloride flush 0.9 % injection 10 mL  10 mL Intravenous 2 times per day Ledprem Bryan, APRN - CNP   10 mL at 02/08/20 7996    sodium chloride flush 0.9 % injection 10 mL  10 mL Intravenous PRN Ledprem Bryan, APRN - CNP        magnesium hydroxide (MILK OF MAGNESIA) 400 MG/5ML suspension 30 mL  30 mL Oral Daily PRN Ledell Sous, APRN - CNP        ondansetron (ZOFRAN) injection 4 mg  4 mg Intravenous Q6H PRN Ledell Sous, APRN - CNP        enoxaparin (LOVENOX) injection 40 mg  40 mg Subcutaneous Daily Jeniffer Méndez APRN - CNP   40 mg at 02/08/20 0822    albuterol (PROVENTIL) nebulizer solution 2.5 mg  2.5 mg Nebulization Q2H PRN Ledell Sheritas, APRN - CNP   2.5 mg at 02/04/20 1937    acetaminophen (TYLENOL) tablet 650 mg  650 mg Oral Q4H PRN Ledell Sous, APRN - CNP   650 mg at 02/07/20 2114    azithromycin (ZITHROMAX) 500 mg in D5W 250ml addavial  500 mg Intravenous Q24H Allie Magallons, APRN - CNP   Stopped at 02/07/20 1940     Assessment and Plan: Active problems:    #  COPD exacerbation  -Resume steroid, nebulizers and azithromycin. Pulmonary consulted. She is on oxygen at home. -Rapid influenza test is negative. #Sinus tachycardia due to above-improved monitor on telemetry. Cardiology seen already. #Ascending aortic ectasia-seen on CT PE.  -Blood pressure stable. Echo pending. Check the diameter as per cardiology.     #Deconditioning  -PT/OT         DISCHARGE PLANNING  Pending SNF      Electronically signed by Gladys Bazzi DO on 2/8/2020 at 11:48 AM

## 2020-02-09 LAB
ANION GAP SERPL CALCULATED.3IONS-SCNC: 9 MEQ/L (ref 9–15)
BLOOD CULTURE, ROUTINE: NORMAL
BUN BLDV-MCNC: 15 MG/DL (ref 8–23)
CALCIUM SERPL-MCNC: 9.6 MG/DL (ref 8.5–9.9)
CHLORIDE BLD-SCNC: 97 MEQ/L (ref 95–107)
CO2: 39 MEQ/L (ref 20–31)
CREAT SERPL-MCNC: 0.48 MG/DL (ref 0.5–0.9)
CULTURE, BLOOD 2: NORMAL
GFR AFRICAN AMERICAN: >60
GFR NON-AFRICAN AMERICAN: >60
GLUCOSE BLD-MCNC: 92 MG/DL (ref 70–99)
HCT VFR BLD CALC: 38.9 % (ref 37–47)
HEMOGLOBIN: 12.6 G/DL (ref 12–16)
MCH RBC QN AUTO: 32.4 PG (ref 27–31.3)
MCHC RBC AUTO-ENTMCNC: 32.3 % (ref 33–37)
MCV RBC AUTO: 100.3 FL (ref 82–100)
PDW BLD-RTO: 13.4 % (ref 11.5–14.5)
PLATELET # BLD: 295 K/UL (ref 130–400)
POTASSIUM REFLEX MAGNESIUM: 4.5 MEQ/L (ref 3.4–4.9)
RBC # BLD: 3.88 M/UL (ref 4.2–5.4)
SODIUM BLD-SCNC: 145 MEQ/L (ref 135–144)
T3 FREE: 1.7 PG/ML (ref 2–4.4)
T4 FREE: 1.28 NG/DL (ref 0.84–1.68)
WBC # BLD: 6.2 K/UL (ref 4.8–10.8)

## 2020-02-09 PROCEDURE — 94640 AIRWAY INHALATION TREATMENT: CPT

## 2020-02-09 PROCEDURE — 36415 COLL VENOUS BLD VENIPUNCTURE: CPT

## 2020-02-09 PROCEDURE — 6370000000 HC RX 637 (ALT 250 FOR IP): Performed by: INTERNAL MEDICINE

## 2020-02-09 PROCEDURE — 84481 FREE ASSAY (FT-3): CPT

## 2020-02-09 PROCEDURE — 1210000000 HC MED SURG R&B

## 2020-02-09 PROCEDURE — 85027 COMPLETE CBC AUTOMATED: CPT

## 2020-02-09 PROCEDURE — 2580000003 HC RX 258: Performed by: NURSE PRACTITIONER

## 2020-02-09 PROCEDURE — 80048 BASIC METABOLIC PNL TOTAL CA: CPT

## 2020-02-09 PROCEDURE — 94669 MECHANICAL CHEST WALL OSCILL: CPT

## 2020-02-09 PROCEDURE — 6360000002 HC RX W HCPCS: Performed by: NURSE PRACTITIONER

## 2020-02-09 PROCEDURE — 2700000000 HC OXYGEN THERAPY PER DAY

## 2020-02-09 PROCEDURE — 94761 N-INVAS EAR/PLS OXIMETRY MLT: CPT

## 2020-02-09 PROCEDURE — 94668 MNPJ CHEST WALL SBSQ: CPT

## 2020-02-09 PROCEDURE — 99232 SBSQ HOSP IP/OBS MODERATE 35: CPT | Performed by: INTERNAL MEDICINE

## 2020-02-09 PROCEDURE — 6360000002 HC RX W HCPCS: Performed by: INTERNAL MEDICINE

## 2020-02-09 PROCEDURE — 84439 ASSAY OF FREE THYROXINE: CPT

## 2020-02-09 RX ADMIN — ENOXAPARIN SODIUM 40 MG: 40 INJECTION SUBCUTANEOUS at 08:36

## 2020-02-09 RX ADMIN — BUDESONIDE 500 MCG: 0.5 SUSPENSION RESPIRATORY (INHALATION) at 07:42

## 2020-02-09 RX ADMIN — ASPIRIN 81 MG: 81 TABLET, COATED ORAL at 08:36

## 2020-02-09 RX ADMIN — ATORVASTATIN CALCIUM 20 MG: 20 TABLET, FILM COATED ORAL at 20:09

## 2020-02-09 RX ADMIN — PREDNISONE 40 MG: 20 TABLET ORAL at 08:36

## 2020-02-09 RX ADMIN — ACYCLOVIR 600 MG: 200 CAPSULE ORAL at 08:36

## 2020-02-09 RX ADMIN — METOPROLOL TARTRATE 50 MG: 50 TABLET, FILM COATED ORAL at 20:09

## 2020-02-09 RX ADMIN — BUDESONIDE 500 MCG: 0.5 SUSPENSION RESPIRATORY (INHALATION) at 20:11

## 2020-02-09 RX ADMIN — Medication 10 ML: at 08:35

## 2020-02-09 RX ADMIN — Medication 10 ML: at 20:09

## 2020-02-09 RX ADMIN — IPRATROPIUM BROMIDE AND ALBUTEROL SULFATE 1 AMPULE: .5; 3 SOLUTION RESPIRATORY (INHALATION) at 20:11

## 2020-02-09 RX ADMIN — IPRATROPIUM BROMIDE AND ALBUTEROL SULFATE 1 AMPULE: .5; 3 SOLUTION RESPIRATORY (INHALATION) at 13:36

## 2020-02-09 RX ADMIN — PANTOPRAZOLE SODIUM 40 MG: 40 TABLET, DELAYED RELEASE ORAL at 06:21

## 2020-02-09 RX ADMIN — IPRATROPIUM BROMIDE AND ALBUTEROL SULFATE 1 AMPULE: .5; 3 SOLUTION RESPIRATORY (INHALATION) at 07:42

## 2020-02-09 RX ADMIN — METOPROLOL TARTRATE 50 MG: 50 TABLET, FILM COATED ORAL at 08:36

## 2020-02-09 NOTE — FLOWSHEET NOTE
0840- ASSISTED UP TO CHAIR FOR BREAKFAST. 0930-MORNING CARE GIVEN. SLIGHT REDNESS NOTED UNDER BREASTS. BRUISING PERSISTS ARMS, RIGHT >. LEFT  Patient is attempting to do more. Up mto commode mhad bowel movement and voided. 1015- Assisted back to bed.

## 2020-02-09 NOTE — PROGRESS NOTES
2/4/2020  EXAM: CT SCAN OF THE THORAX WITH CONTRAST/PE PROTOCOL/CTA CHEST COMPARISON: CHEST RADIOGRAPH, FEBRUARY 4, 2020, JULY 31, 2019. REASON FOR EXAMINATION:  SHORTNESS OF BREATH. PNEUMONIA VERSUS PULMONARY EMBOLISM. TECHNIQUE: Helical CTA was performed through the chest utilizing 100 mL of Isovue 370 intravenous contrast.  Images were obtained with bolus tracking in order to opacify the pulmonary arteries. Thick section coronal MIP 3D reconstructions were performed  on a separate workstation. FINDINGS:  No intraluminal filling defects, pulmonary arterial tree. Cardiac size enlarged. No pericardial effusion. Ascending thoracic aorta measures 4.0 x 3.7 cm, pulmonary arterial bifurcation. Multiple lymph nodes identified within aortopulmonary window, pretracheal space, and precarinal space, as well as subcarinal space, measuring between 8 and 12 mm. Right hilar lymph node identified measuring 12 mm. Normal size lymph nodes left hilum. No axillary lymph node enlargement Right lung shows emphysematous change. Minimal dependent subsegmental atelectatic change right upper lobe marginated by major fissure. Scarring anterior right upper lobe. Trace dependent atelectatic change. 1.4 x 1.7 cm pleural-based noncalcified nodular  area, right posterior lung base (series 2, image 96). No pleural effusion. Left lung shows diffuse emphysematous change. Mild dependent subsegmental atelectatic change left upper lobe, lingula, and greatest at left lung base. No nodules or masses. No pleural effusion. Limited imaging upper abdomen shows adrenal glands without anomaly. 3.2 cm cyst, posterior left upper pole kidney. Remote T12 kyphoplasty. No osteoblastic, and no osteolytic lesions. No CT evidence pulmonary embolism. Diffuse emphysema. Bilateral multifocal areas of atelectatic changes described. Probable nodular focus of atelectasis, posterior left lung base.  If clinical concern warrants, follow-up CT imaging in 3 months following

## 2020-02-09 NOTE — PROGRESS NOTES
Hospitalist Progress Note      PCP: Starla Stahl DO    Date of Admission: 2/4/2020    Chief Complaint:    Chief Complaint   Patient presents with    Shortness of Breath    Fall     hit face, states no blood thinners     Subjective: :  Denies any complaints  Waiting for precert for NH    Medications:  Reviewed    Infusion Medications   Scheduled Medications    acyclovir  600 mg Oral Daily    ipratropium-albuterol  1 ampule Inhalation TID    metoprolol tartrate  50 mg Oral BID    aspirin  81 mg Oral Daily    pantoprazole  40 mg Oral QAM AC    atorvastatin  20 mg Oral Nightly    predniSONE  40 mg Oral Daily    budesonide  0.5 mg Nebulization BID    sodium chloride flush  10 mL Intravenous 2 times per day    enoxaparin  40 mg Subcutaneous Daily     PRN Meds: sodium chloride flush, magnesium hydroxide, ondansetron, albuterol, acetaminophen      Intake/Output Summary (Last 24 hours) at 2/9/2020 1105  Last data filed at 2/8/2020 1819  Gross per 24 hour   Intake 460 ml   Output 450 ml   Net 10 ml       Exam:    /63   Pulse 96   Temp 97.4 °F (36.3 °C) (Oral)   Resp 18   Ht 5' 5\" (1.651 m)   Wt 200 lb (90.7 kg)   SpO2 96%   BMI 33.28 kg/m²     General appearance: No apparent distress  HEENT:  Conjunctivae/corneas clear. Neck: Supple, with full range of motion. Respiratory:  Mild wheeze, bibasal crackles. Cardiovascular: Regular rate and rhythm with normal S1/S2 without murmurs, rubs or gallops. Abdomen: Soft, non-tender, non-distended with normal bowel sounds. Musculoskeletal: No clubbing, cyanosis or edema bilaterally. Skin: Skin color, texture, turgor normal.  No rashes or lesions. Neuro: alert, moving all extremities.        Labs:   Recent Labs     02/07/20  0513 02/08/20  0520 02/09/20  0602   WBC 6.5 6.0 6.2   HGB 11.7* 12.1 12.6   HCT 36.0* 36.7* 38.9    288 295     Recent Labs     02/07/20  0513 02/08/20  0520 02/09/20  0602    149* 145*   K 3.8 4.1 4.5   CL 97 100 97 CO2 36* 36* 39*   BUN 16 15 15   CREATININE 0.43* 0.45* 0.48*   CALCIUM 9.2 9.6 9.6     No results for input(s): AST, ALT, BILIDIR, BILITOT, ALKPHOS in the last 72 hours. No results for input(s): INR in the last 72 hours. No results for input(s): Levonia Citrin in the last 72 hours. Urinalysis:      Lab Results   Component Value Date    NITRU Negative 02/05/2020    WBCUA 3-5 02/05/2020    BACTERIA RARE 02/05/2020    RBCUA 3-5 02/05/2020    BLOODU Negative 02/05/2020    SPECGRAV 1.019 02/05/2020    GLUCOSEU Negative 02/05/2020       Radiology:  CTA Chest W WO  (PE study)   Final Result      No CT evidence pulmonary embolism. Diffuse emphysema. Bilateral multifocal areas of atelectatic changes described. Probable nodular focus of atelectasis, posterior left lung base. If clinical concern warrants, follow-up CT imaging in 3 months following treatment may be utilized to demonstrate resolution of finding and rule out underlying malignancy. Ascending aortic ectasia. Multiple borderline lymph nodes as discussed, and mediastinum and right hilum, most likely reactive in etiology. Other findings discussed. All CT scans at this facility use dose modulation, iterative reconstruction, and/or weight based dosing when appropriate to reduce radiation dose to as low as reasonably achievable. CT Head WO Contrast   Final Result   Age-related changes. Nothing acute intracranially. XR CHEST PORTABLE   Final Result   LEFT MID/LOWER LUNG ATELECTASIS/PNEUMONIA              Assessment/Plan:    Active Hospital Problems    Diagnosis Date Noted    COPD with acute exacerbation ([facility] [J44.1] 02/04/2020     80year-old male with history of COPD who presented with shortness of breath.     COPD exacerbation  -Continue prednisone, azithromycin, duo nebs, Pulmicort  -Pulmonary following  -Rapid flu negative    Lung nodule - repeat CT in 6 weeks per pulmonary    Tachycardia  -

## 2020-02-10 VITALS
DIASTOLIC BLOOD PRESSURE: 48 MMHG | BODY MASS INDEX: 33.32 KG/M2 | HEART RATE: 105 BPM | RESPIRATION RATE: 18 BRPM | WEIGHT: 200 LBS | HEIGHT: 65 IN | SYSTOLIC BLOOD PRESSURE: 124 MMHG | TEMPERATURE: 97.8 F | OXYGEN SATURATION: 97 %

## 2020-02-10 LAB
ANION GAP SERPL CALCULATED.3IONS-SCNC: 9 MEQ/L (ref 9–15)
BUN BLDV-MCNC: 16 MG/DL (ref 8–23)
CALCIUM SERPL-MCNC: 9 MG/DL (ref 8.5–9.9)
CHLORIDE BLD-SCNC: 96 MEQ/L (ref 95–107)
CO2: 40 MEQ/L (ref 20–31)
CREAT SERPL-MCNC: 0.61 MG/DL (ref 0.5–0.9)
GFR AFRICAN AMERICAN: >60
GFR NON-AFRICAN AMERICAN: >60
GLUCOSE BLD-MCNC: 92 MG/DL (ref 70–99)
HCT VFR BLD CALC: 36.1 % (ref 37–47)
HEMOGLOBIN: 11.5 G/DL (ref 12–16)
MAGNESIUM: 2.4 MG/DL (ref 1.7–2.4)
MCH RBC QN AUTO: 32.2 PG (ref 27–31.3)
MCHC RBC AUTO-ENTMCNC: 31.9 % (ref 33–37)
MCV RBC AUTO: 100.8 FL (ref 82–100)
PDW BLD-RTO: 13.4 % (ref 11.5–14.5)
PLATELET # BLD: 292 K/UL (ref 130–400)
POTASSIUM REFLEX MAGNESIUM: 4.4 MEQ/L (ref 3.4–4.9)
RBC # BLD: 3.58 M/UL (ref 4.2–5.4)
SODIUM BLD-SCNC: 145 MEQ/L (ref 135–144)
WBC # BLD: 7.4 K/UL (ref 4.8–10.8)

## 2020-02-10 PROCEDURE — 99232 SBSQ HOSP IP/OBS MODERATE 35: CPT | Performed by: INTERNAL MEDICINE

## 2020-02-10 PROCEDURE — 85027 COMPLETE CBC AUTOMATED: CPT

## 2020-02-10 PROCEDURE — 94664 DEMO&/EVAL PT USE INHALER: CPT

## 2020-02-10 PROCEDURE — 80048 BASIC METABOLIC PNL TOTAL CA: CPT

## 2020-02-10 PROCEDURE — 94761 N-INVAS EAR/PLS OXIMETRY MLT: CPT

## 2020-02-10 PROCEDURE — 83735 ASSAY OF MAGNESIUM: CPT

## 2020-02-10 PROCEDURE — 36415 COLL VENOUS BLD VENIPUNCTURE: CPT

## 2020-02-10 PROCEDURE — 94640 AIRWAY INHALATION TREATMENT: CPT

## 2020-02-10 PROCEDURE — 6360000002 HC RX W HCPCS: Performed by: NURSE PRACTITIONER

## 2020-02-10 PROCEDURE — 6360000002 HC RX W HCPCS: Performed by: INTERNAL MEDICINE

## 2020-02-10 PROCEDURE — 97535 SELF CARE MNGMENT TRAINING: CPT

## 2020-02-10 PROCEDURE — 6370000000 HC RX 637 (ALT 250 FOR IP): Performed by: INTERNAL MEDICINE

## 2020-02-10 PROCEDURE — 2580000003 HC RX 258: Performed by: NURSE PRACTITIONER

## 2020-02-10 PROCEDURE — 2700000000 HC OXYGEN THERAPY PER DAY

## 2020-02-10 RX ADMIN — BUDESONIDE 500 MCG: 0.5 SUSPENSION RESPIRATORY (INHALATION) at 08:33

## 2020-02-10 RX ADMIN — PANTOPRAZOLE SODIUM 40 MG: 40 TABLET, DELAYED RELEASE ORAL at 06:27

## 2020-02-10 RX ADMIN — IPRATROPIUM BROMIDE AND ALBUTEROL SULFATE 1 AMPULE: .5; 3 SOLUTION RESPIRATORY (INHALATION) at 13:24

## 2020-02-10 RX ADMIN — Medication 10 ML: at 09:29

## 2020-02-10 RX ADMIN — PREDNISONE 40 MG: 20 TABLET ORAL at 09:29

## 2020-02-10 RX ADMIN — IPRATROPIUM BROMIDE AND ALBUTEROL SULFATE 1 AMPULE: .5; 3 SOLUTION RESPIRATORY (INHALATION) at 08:33

## 2020-02-10 RX ADMIN — ACYCLOVIR 600 MG: 200 CAPSULE ORAL at 09:32

## 2020-02-10 RX ADMIN — ASPIRIN 81 MG: 81 TABLET, COATED ORAL at 09:29

## 2020-02-10 RX ADMIN — METOPROLOL TARTRATE 50 MG: 50 TABLET, FILM COATED ORAL at 09:29

## 2020-02-10 RX ADMIN — ENOXAPARIN SODIUM 40 MG: 40 INJECTION SUBCUTANEOUS at 09:29

## 2020-02-10 ASSESSMENT — PAIN SCALES - GENERAL: PAINLEVEL_OUTOF10: 0

## 2020-02-10 NOTE — DISCHARGE SUMMARY
upper lobe. Trace dependent atelectatic change. 1.4 x 1.7 cm pleural-based noncalcified nodular  area, right posterior lung base (series 2, image 96). No pleural effusion. Left lung shows diffuse emphysematous change. Mild dependent subsegmental atelectatic change left upper lobe, lingula, and greatest at left lung base. No nodules or masses. No pleural effusion. Limited imaging upper abdomen shows adrenal glands without anomaly. 3.2 cm cyst, posterior left upper pole kidney. Remote T12 kyphoplasty. No osteoblastic, and no osteolytic lesions. No CT evidence pulmonary embolism. Diffuse emphysema. Bilateral multifocal areas of atelectatic changes described. Probable nodular focus of atelectasis, posterior left lung base. If clinical concern warrants, follow-up CT imaging in 3 months following treatment may be utilized to demonstrate resolution of finding and rule out underlying malignancy. Ascending aortic ectasia. Multiple borderline lymph nodes as discussed, and mediastinum and right hilum, most likely reactive in etiology. Other findings discussed. All CT scans at this facility use dose modulation, iterative reconstruction, and/or weight based dosing when appropriate to reduce radiation dose to as low as reasonably achievable. Xr Chest Portable    Result Date: 2/4/2020  EXAMINATION: XR CHEST PORTABLE CLINICAL HISTORY: SHORTNESS OF BREATH COMPARISONS: CHEST RADIOGRAPHS, JULY 31, 2019, JANUARY 2, 2019 FINDINGS: Osseous structures intact. Cardiopericardial silhouette is normal. Pulmonary vasculature is normal. Ill-defined area of increased opacity visualized laterally left mid, and left lower lung. Right lung clear.      LEFT MID/LOWER LUNG ATELECTASIS/PNEUMONIA      Discharge Medications:       Juan C Dominguez   Home Medication Instructions [id number]    Printed on:02/10/20 1556   Medication Information                      albuterol (PROVENTIL) (2.5 MG/3ML) 0.083% nebulizer solution  Take 2.5 mg by

## 2020-02-10 NOTE — PROGRESS NOTES
INPATIENT PROGRESS NOTES    PATIENT NAME: Shira Hernandez  MRN: [id number]  SERVICE DATE:  February 10, 2020   SERVICE TIME:  4:49 PM      PRIMARY SERVICE: Pulmonary Disease    CHIEF COMPLAIN: Shortness of breath    INTERVAL HPI: Patient seen and examined at bedside, Interval Notes, orders reviewed. Nursing notes noted  She is feeling better. No complaint of shortness of breath at rest.  No cough with sputum production. No fever. No chills no chest pain no pleuritic pain. She is going back to nursing home today     OBJECTIVE    Body mass index is 33.28 kg/m². PHYSICAL EXAM:  Vitals:  BP (!) 124/48   Pulse 105   Temp 97.8 °F (36.6 °C) (Oral)   Resp 18   Ht 5' 5\" (1.651 m)   Wt 200 lb (90.7 kg)   SpO2 97%   BMI 33.28 kg/m²   General:  Alert, awake . comfortable in bed, No distress. appears stated age and cooperative  Head: Atraumatic , Normocephalic   Eyes: PERRL. No sclera icterus. No conjunctival injection. No discharge   ENT: No nasal  discharge. Pharynx clear. lips, teeth, mucosa and gums are normal, tongue protrudes in the midline  Neck:  Trachea midline. No thyromegaly, no JVD, No cervical adenopathy. Chest : Bilaterally symmetrical ,Normal effort,  No accessory muscle use  Lung : . Fair BS bilateral, decreased BS at bases. No Rales. Few scattered rhonchi at the basilar area   . heart[de-identified] Normal  rate. Regular rhythm. No mumur ,  Rub or gallop  ABD: Non-tender. Non-distended. No masses. No organmegaly. Normal bowel sounds. No hernia.   Ext : 1 + pitting both leg , No Cyanosis No clubbing  Neuro: no focal weakness          DATA:   Recent Labs     02/09/20  0602 02/10/20  0540   WBC 6.2 7.4   HGB 12.6 11.5*   HCT 38.9 36.1*   .3* 100.8*    292     Recent Labs     02/09/20  0602 02/10/20  0540   * 145*   K 4.5 4.4   CL 97 96   CO2 39* 40*   BUN 15 16   CREATININE 0.48* 0.61   GLUCOSE 92 92   CALCIUM 9.6 9.0   LABGLOM >60.0 >60.0   GFRAA >60.0 >60.0       MV Settings:          No results for input(s): PHART, DWY5GTA, PO2ART, RQA0VBZ, BEART, Y4NBOQST in the last 72 hours. O2 Device: Nasal cannula  O2 Flow Rate (L/min): 3 L/min    DIET CARDIAC;     MEDICATIONS during current hospitalization:    Continuous Infusions:    Scheduled Meds:   acyclovir  600 mg Oral Daily    ipratropium-albuterol  1 ampule Inhalation TID    metoprolol tartrate  50 mg Oral BID    aspirin  81 mg Oral Daily    pantoprazole  40 mg Oral QAM AC    atorvastatin  20 mg Oral Nightly    predniSONE  40 mg Oral Daily    budesonide  0.5 mg Nebulization BID    sodium chloride flush  10 mL Intravenous 2 times per day    enoxaparin  40 mg Subcutaneous Daily       PRN Meds:sodium chloride flush, magnesium hydroxide, ondansetron, albuterol, acetaminophen    Radiology  Ct Head Wo Contrast    Result Date: 2/4/2020  EXAMINATION: CT HEAD WO CONTRAST  DATE AND TIME:2/4/2020 11:30 AM CLINICAL HISTORY: Severe headache.  fall hit head  COMPARISON: None TECHNIQUE:Axial CT from skull base to vertex without  contrast..  All CT scans at this facility use dose modulation, iterative reconstruction, and/or weight based dosing when appropriate to reduce radiation dose to as low as reasonably achievable. FINDINGS CSF spaces: Ventricles are normal in size and position. Sulci are appropriate for age. Brain parenchyma: There is no parenchymal mass, or mass effect signs of acute infarct, acute hemorrhage or extra-axial fluid. There are a few scattered hyperintensity in the periventricular white matter that can be related to microvascular disease. This  can also be seen as part of normal aging. Hypodense area in the left subinsular area likely the left temporal tip versus small remote ischemic focus. Skull base: The bony calvarium and skull base are normal.   The visualized paranasal sinuses and mastoids are clear. Age-related changes. Nothing acute intracranially.      Cta Chest W Wo  (pe Study)    Result Date: 2/4/2020  EXAM: CT SCAN OF THE THORAX WITH CONTRAST/PE PROTOCOL/CTA CHEST COMPARISON: CHEST RADIOGRAPH, FEBRUARY 4, 2020, JULY 31, 2019. REASON FOR EXAMINATION:  SHORTNESS OF BREATH. PNEUMONIA VERSUS PULMONARY EMBOLISM. TECHNIQUE: Helical CTA was performed through the chest utilizing 100 mL of Isovue 370 intravenous contrast.  Images were obtained with bolus tracking in order to opacify the pulmonary arteries. Thick section coronal MIP 3D reconstructions were performed  on a separate workstation. FINDINGS:  No intraluminal filling defects, pulmonary arterial tree. Cardiac size enlarged. No pericardial effusion. Ascending thoracic aorta measures 4.0 x 3.7 cm, pulmonary arterial bifurcation. Multiple lymph nodes identified within aortopulmonary window, pretracheal space, and precarinal space, as well as subcarinal space, measuring between 8 and 12 mm. Right hilar lymph node identified measuring 12 mm. Normal size lymph nodes left hilum. No axillary lymph node enlargement Right lung shows emphysematous change. Minimal dependent subsegmental atelectatic change right upper lobe marginated by major fissure. Scarring anterior right upper lobe. Trace dependent atelectatic change. 1.4 x 1.7 cm pleural-based noncalcified nodular  area, right posterior lung base (series 2, image 96). No pleural effusion. Left lung shows diffuse emphysematous change. Mild dependent subsegmental atelectatic change left upper lobe, lingula, and greatest at left lung base. No nodules or masses. No pleural effusion. Limited imaging upper abdomen shows adrenal glands without anomaly. 3.2 cm cyst, posterior left upper pole kidney. Remote T12 kyphoplasty. No osteoblastic, and no osteolytic lesions. No CT evidence pulmonary embolism. Diffuse emphysema. Bilateral multifocal areas of atelectatic changes described. Probable nodular focus of atelectasis, posterior left lung base.  If clinical concern warrants, follow-up CT imaging in 3 months following

## 2020-02-10 NOTE — PROGRESS NOTES
[facility] Respiratory Therapy Evaluation   Current Order: duoneb tid     Home Regimen:prn       Ordering Physician: yordy  Re-evaluation Date: 2/13     Diagnosis: copd      Patient Status: Stable / Unstable + Physician notified    The following MDI Criteria must be met in order to convert aerosol to MDI with spacer. If unable to meet, MDI will be converted to aerosol:  []  Patient able to demonstrate the ability to use MDI effectively  []  Patient alert and cooperative  []  Patient able to take deep breath with 5-10 second hold  []  Medication(s) available in this delivery method   []  Peak flow greater than or equal to 200 ml/min            Current Order Substituted To  (same drug, same frequency)   Aerosol to MDI [] Albuterol Sulfate 0.083% unit dose by aerosol Albuterol Sulfate MDI 2 puffs by inhalation with spacer    [] Levalbuterol 1.25 mg unit dose by aerosol Levalbuterol MDI 2 puffs by inhalation with spacer    [] Levalbuterol 0.63 mg unit dose by aerosol Levalbuterol MDI 2 puffs by inhalation with spacer    [] Ipratropium Bromide 0.02% unit dose by aerosol Ipratropium Bromide MDI 2 puffs by inhalation with spacer    [] Duoneb (Ipratropium + Albuterol) unit dose by aerosol Ipratropium MDI + Albuterol MDI 2 puffs by inhalation w/spacer   MDI to Aerosol [] Albuterol Sulfate MDI Albuterol Sulfate 0.083% unit dose by aerosol    [] Levalbuterol MDI 2 puffs by inhalation Levalbuterol 1.25 mg unit dose by aerosol    [] Ipratropium Bromide MDI by inhalation Ipratropium Bromide 0.02% unit dose by aerosol    [] Combivent (Ipratropium + Albuterol) MDI by inhalation Duoneb (Ipratropium + Albuterol) unit dose by aerosol   Treatment Assessment [Frequency/Schedule]:  Change frequency to: no change __________________________________________________per Protocol, P&T, MEC      Points 0 1 2 3 4   Pulmonary Status  Non-Smoker  []   Smoking history   < 20 pack years  []   Smoking history  ?  20 pack years  []   Pulmonary Disorder  (acute or chronic)  [x]   Severe or Chronic w/ Exacerbation  []     Surgical Status No [x]   Surgeries     General []   Surgery Lower []   Abdominal Thoracic or []   Upper Abdominal Thoracic with  PulmonaryDisorder  []     Chest X-ray Clear/Not  Ordered     [x]  Chronic Changes  Results Pending  []  Infiltrates, atelectasis, pleural effusion, or edema  []  Infiltrates in more than one lobe []  Infiltrate + Atelectasis, &/or pleural effusion  []    Respiratory Pattern Regular,  RR = 12-20 [x]  Increased,  RR = 21-25 []  POWELL, irregular,  or RR = 26-30 []  Decreased FEV1  or RR = 31-35 []  Severe SOB, use  of accessory muscles, or RR ? 35  []    Mental Status Alert, oriented,  Cooperative [x]  Confused but Follows commands []  Lethargic or unable to follow commands []  Obtunded  []  Comatose  []    Breath Sounds Clear to  auscultation  []  Decreased unilaterally or  in bases only []  Decreased  bilaterally  [x]  Crackles or intermittent wheezes []  Wheezes []    Cough Strong, Spontan., & nonproductive [x]  Strong,  spontaneous, &  productive []  Weak,  Nonproductive []  Weak, productive or  with wheezes []  No spontaneous  cough or may require suctioning []    Level of Activity Ambulatory []  Ambulatory w/ Assist  [x]  Non-ambulatory []  Paraplegic []  Quadriplegic []    Total    Score:____6___     Triage Score:___4_____      Tri       Triage:     1. (>20) Freq: Q3    2. (16-20) Freq: Q4   3. (11-15) Freq: QID & Albuterol Q2 PRN    4. (6-10) Freq: TID & Albuterol Q2 PRN    5. (0-5) Freq Q4prn

## 2020-02-10 NOTE — PROGRESS NOTES
02/10/20  0540   * 145* 145*   K 4.1 4.5 4.4    97 96   CO2 36* 39* 40*   BUN 15 15 16   CREATININE 0.45* 0.48* 0.61   CALCIUM 9.6 9.6 9.0     No results for input(s): AST, ALT, BILIDIR, BILITOT, ALKPHOS in the last 72 hours. No results for input(s): INR in the last 72 hours. No results for input(s): Marciano Lori in the last 72 hours. Urinalysis:      Lab Results   Component Value Date    NITRU Negative 02/05/2020    WBCUA 3-5 02/05/2020    BACTERIA RARE 02/05/2020    RBCUA 3-5 02/05/2020    BLOODU Negative 02/05/2020    SPECGRAV 1.019 02/05/2020    GLUCOSEU Negative 02/05/2020       Radiology:  CTA Chest W WO  (PE study)   Final Result      No CT evidence pulmonary embolism. Diffuse emphysema. Bilateral multifocal areas of atelectatic changes described. Probable nodular focus of atelectasis, posterior left lung base. If clinical concern warrants, follow-up CT imaging in 3 months following treatment may be utilized to demonstrate resolution of finding and rule out underlying malignancy. Ascending aortic ectasia. Multiple borderline lymph nodes as discussed, and mediastinum and right hilum, most likely reactive in etiology. Other findings discussed. All CT scans at this facility use dose modulation, iterative reconstruction, and/or weight based dosing when appropriate to reduce radiation dose to as low as reasonably achievable. CT Head WO Contrast   Final Result   Age-related changes. Nothing acute intracranially.                   XR CHEST PORTABLE   Final Result   LEFT MID/LOWER LUNG ATELECTASIS/PNEUMONIA              Assessment/Plan:    Active Hospital Problems    Diagnosis Date Noted    COPD with acute exacerbation ([facility] [address] [J44.1] 02/04/2020       80year-old male with history of COPD who presented with shortness of breath.     COPD exacerbation  -Continue prednisone, azithromycin, duo nebs, Pulmicort  -Pulmonary following  -Rapid flu negative     Lung nodule - repeat CT in 6 weeks per pulmonary     Tachycardia  - improved  - ECHO - EF 65%  - cardiology evaluated  - on metoprolol  - 8 beat run of ? NSVT or rate related bundle branch block. Will ask cardiology to see.      Aortic ectasia   - aortic root - 3.07 cm on ECHO     pulmonary hypertension     Low TSH  - low free T3 and normal T4.   - ? Sick euthyroid syndrome  - outpatient follow up with endocrine.      Deconditioning - awaiting precert for NH placement    Additional work up or/and treatment plan may be added today or then after based on clinical progression. I am managing a portion of pt care. Some medical issues are handled by other specialists. Additional work up and treatment should be done in out pt setting by pt PCP and other out pt providers. In addition to examining and evaluating pt, I spent additional time explaining care, normal and abnormal findings, and treatment plan. All of pt questions were answered. Counseling, diet and education were  provided. Case will be discussed with nursing staff when appropriate. Family will be updated if and when appropriate.       Diet: DIET CARDIAC;    Code Status: Full Code    Electronically signed by Diana Cedeño MD on 2/10/2020 at 10:56 AM

## 2020-02-10 NOTE — PROGRESS NOTES
supervision with bed mobility  Long term goal 2: supervision with transfers  Long term goal 3: pt to ambulate >50 ft with supervision [employer]  Long term goal 4: pt to navigate 3 steps with CGA  Patient Goals   Patient goals : to go home soon    PLAN          [facility] (6 CLICK) BASIC MOBILITY: 9        Therapy Time   Individual   Time In  1030   Time Out 1053   Minutes 23   23 minutes bed mob/transfer/seated eob balance.           Suzan Severe, PTA, 02/10/20 at 11:43 AM

## 2020-02-11 NOTE — PROGRESS NOTES
Physical Therapy  Facility/Department: [facility] MED SURG T372/[id number]  Physical Therapy Discharge      NAME: Alisson Mcknight    : 1934 (80 y.o.)  MRN: [id number]    Account: [de-identified]  Gender: female      Patient has been discharged from acute care hospital. DC patient from current PT program.      Electronically signed by Amanuel Brumfield PT on 20 at 2:14 PM

## 2020-02-18 ENCOUNTER — TELEPHONE (OUTPATIENT)
Dept: OTHER | Facility: CLINIC | Age: 85
End: 2020-02-18

## 2020-02-18 ENCOUNTER — HOSPITAL ENCOUNTER (INPATIENT)
Age: 85
LOS: 3 days | Discharge: NURSING, REHAB OR LONG-TERM CARE | DRG: 189 | End: 2020-02-21
Attending: INTERNAL MEDICINE | Admitting: INTERNAL MEDICINE
Payer: MEDICARE

## 2020-02-18 ENCOUNTER — APPOINTMENT (OUTPATIENT)
Dept: CT IMAGING | Age: 85
DRG: 189 | End: 2020-02-18
Payer: MEDICARE

## 2020-02-18 PROBLEM — J96.02 ACUTE RESPIRATORY FAILURE WITH HYPOXIA AND HYPERCARBIA (HCC): Status: ACTIVE | Noted: 2020-02-18

## 2020-02-18 PROBLEM — J96.01 ACUTE RESPIRATORY FAILURE WITH HYPOXIA AND HYPERCARBIA (HCC): Status: ACTIVE | Noted: 2020-02-18

## 2020-02-18 LAB
ALBUMIN SERPL-MCNC: 3.6 G/DL (ref 3.5–4.6)
ALP BLD-CCNC: 89 U/L (ref 40–130)
ALT SERPL-CCNC: 27 U/L (ref 0–33)
ANION GAP SERPL CALCULATED.3IONS-SCNC: 9 MEQ/L (ref 9–15)
APTT: 25.1 SEC (ref 24.4–36.8)
AST SERPL-CCNC: 18 U/L (ref 0–35)
BACTERIA: NEGATIVE /HPF
BASE EXCESS VENOUS: 15 (ref -3–3)
BASOPHILS ABSOLUTE: 0 K/UL (ref 0–0.2)
BASOPHILS RELATIVE PERCENT: 0.2 %
BILIRUB SERPL-MCNC: <0.2 MG/DL (ref 0.2–0.7)
BILIRUBIN URINE: NEGATIVE
BLOOD, URINE: NEGATIVE
BUN BLDV-MCNC: 17 MG/DL (ref 8–23)
CALCIUM IONIZED: 1.19 MMOL/L (ref 1.12–1.32)
CALCIUM SERPL-MCNC: 9.5 MG/DL (ref 8.5–9.9)
CHLORIDE BLD-SCNC: 97 MEQ/L (ref 95–107)
CLARITY: ABNORMAL
CO2: 40 MEQ/L (ref 20–31)
COLOR: YELLOW
CREAT SERPL-MCNC: 0.51 MG/DL (ref 0.5–0.9)
EOSINOPHILS ABSOLUTE: 0 K/UL (ref 0–0.7)
EOSINOPHILS RELATIVE PERCENT: 0.1 %
EPITHELIAL CELLS, UA: ABNORMAL /HPF (ref 0–5)
GFR AFRICAN AMERICAN: >60
GFR AFRICAN AMERICAN: >60
GFR NON-AFRICAN AMERICAN: >60
GFR NON-AFRICAN AMERICAN: >60
GLOBULIN: 2.7 G/DL (ref 2.3–3.5)
GLUCOSE BLD-MCNC: 119 MG/DL (ref 60–115)
GLUCOSE BLD-MCNC: 133 MG/DL (ref 70–99)
GLUCOSE URINE: 250 MG/DL
HCO3 VENOUS: 41.3 MMOL/L (ref 23–29)
HCT VFR BLD CALC: 35.1 % (ref 37–47)
HEMOGLOBIN: 11.2 G/DL (ref 12–16)
HEMOGLOBIN: 12.9 GM/DL (ref 12–16)
HYALINE CASTS: ABNORMAL /HPF (ref 0–5)
INR BLD: 1
KETONES, URINE: NEGATIVE MG/DL
LACTATE: 3.03 MMOL/L (ref 0.4–2)
LACTIC ACID, SEPSIS: 1.4 MMOL/L (ref 0.5–1.9)
LEUKOCYTE ESTERASE, URINE: ABNORMAL
LIPASE: 37 U/L (ref 12–95)
LYMPHOCYTES ABSOLUTE: 0.4 K/UL (ref 1–4.8)
LYMPHOCYTES RELATIVE PERCENT: 4.8 %
MAGNESIUM: 2.2 MG/DL (ref 1.7–2.4)
MCH RBC QN AUTO: 32.1 PG (ref 27–31.3)
MCHC RBC AUTO-ENTMCNC: 31.9 % (ref 33–37)
MCV RBC AUTO: 100.5 FL (ref 82–100)
MONOCYTES ABSOLUTE: 0.8 K/UL (ref 0.2–0.8)
MONOCYTES RELATIVE PERCENT: 9.4 %
NEUTROPHILS ABSOLUTE: 7.7 K/UL (ref 1.4–6.5)
NEUTROPHILS RELATIVE PERCENT: 85.5 %
NITRITE, URINE: NEGATIVE
O2 SAT, VEN: 80 %
PCO2, VEN: 82.4 MM HG (ref 40–50)
PDW BLD-RTO: 14.1 % (ref 11.5–14.5)
PERFORMED ON: ABNORMAL
PH UA: 6.5 (ref 5–9)
PH VENOUS: 7.31 (ref 7.35–7.45)
PLATELET # BLD: 188 K/UL (ref 130–400)
PO2, VEN: 51 MM HG
POC CHLORIDE: 95 MEQ/L (ref 99–110)
POC CREATININE WHOLE BLOOD: 0.7
POC CREATININE: 0.7 MG/DL (ref 0.6–1.2)
POC HEMATOCRIT: 38 % (ref 36–48)
POC POTASSIUM: 3.9 MEQ/L (ref 3.5–5.1)
POC SAMPLE TYPE: ABNORMAL
POC SODIUM: 142 MEQ/L (ref 136–145)
POTASSIUM SERPL-SCNC: 4.4 MEQ/L (ref 3.4–4.9)
PRO-BNP: 690 PG/ML
PROCALCITONIN: 0.03 NG/ML (ref 0–0.15)
PROTEIN UA: NEGATIVE MG/DL
PROTHROMBIN TIME: 13.6 SEC (ref 12.3–14.9)
RBC # BLD: 3.49 M/UL (ref 4.2–5.4)
SODIUM BLD-SCNC: 146 MEQ/L (ref 135–144)
SPECIFIC GRAVITY UA: 1.02 (ref 1–1.03)
TCO2 CALC VENOUS: 44 MMOL/L
TOTAL CK: 25 U/L (ref 0–170)
TOTAL PROTEIN: 6.3 G/DL (ref 6.3–8)
TROPONIN: <0.01 NG/ML (ref 0–0.01)
TSH SERPL DL<=0.05 MIU/L-ACNC: 0.22 UIU/ML (ref 0.44–3.86)
URINE REFLEX TO CULTURE: YES
UROBILINOGEN, URINE: 0.2 E.U./DL
WBC # BLD: 9 K/UL (ref 4.8–10.8)
WBC UA: ABNORMAL /HPF (ref 0–5)

## 2020-02-18 PROCEDURE — 84443 ASSAY THYROID STIM HORMONE: CPT

## 2020-02-18 PROCEDURE — 87086 URINE CULTURE/COLONY COUNT: CPT

## 2020-02-18 PROCEDURE — 36600 WITHDRAWAL OF ARTERIAL BLOOD: CPT

## 2020-02-18 PROCEDURE — 82435 ASSAY OF BLOOD CHLORIDE: CPT

## 2020-02-18 PROCEDURE — 84145 PROCALCITONIN (PCT): CPT

## 2020-02-18 PROCEDURE — 83690 ASSAY OF LIPASE: CPT

## 2020-02-18 PROCEDURE — 85730 THROMBOPLASTIN TIME PARTIAL: CPT

## 2020-02-18 PROCEDURE — 81001 URINALYSIS AUTO W/SCOPE: CPT

## 2020-02-18 PROCEDURE — 84132 ASSAY OF SERUM POTASSIUM: CPT

## 2020-02-18 PROCEDURE — 85610 PROTHROMBIN TIME: CPT

## 2020-02-18 PROCEDURE — 80053 COMPREHEN METABOLIC PANEL: CPT

## 2020-02-18 PROCEDURE — 94660 CPAP INITIATION&MGMT: CPT

## 2020-02-18 PROCEDURE — 83735 ASSAY OF MAGNESIUM: CPT

## 2020-02-18 PROCEDURE — 82550 ASSAY OF CK (CPK): CPT

## 2020-02-18 PROCEDURE — 83605 ASSAY OF LACTIC ACID: CPT

## 2020-02-18 PROCEDURE — 70450 CT HEAD/BRAIN W/O DYE: CPT

## 2020-02-18 PROCEDURE — 82330 ASSAY OF CALCIUM: CPT

## 2020-02-18 PROCEDURE — 36415 COLL VENOUS BLD VENIPUNCTURE: CPT

## 2020-02-18 PROCEDURE — 6360000004 HC RX CONTRAST MEDICATION: Performed by: PERSONAL EMERGENCY RESPONSE ATTENDANT

## 2020-02-18 PROCEDURE — 74177 CT ABD & PELVIS W/CONTRAST: CPT

## 2020-02-18 PROCEDURE — 72125 CT NECK SPINE W/O DYE: CPT

## 2020-02-18 PROCEDURE — 85025 COMPLETE CBC W/AUTO DIFF WBC: CPT

## 2020-02-18 PROCEDURE — 71260 CT THORAX DX C+: CPT

## 2020-02-18 PROCEDURE — 87077 CULTURE AEROBIC IDENTIFY: CPT

## 2020-02-18 PROCEDURE — 82565 ASSAY OF CREATININE: CPT

## 2020-02-18 PROCEDURE — 1210000000 HC MED SURG R&B

## 2020-02-18 PROCEDURE — 2580000003 HC RX 258: Performed by: PERSONAL EMERGENCY RESPONSE ATTENDANT

## 2020-02-18 PROCEDURE — 84484 ASSAY OF TROPONIN QUANT: CPT

## 2020-02-18 PROCEDURE — 84295 ASSAY OF SERUM SODIUM: CPT

## 2020-02-18 PROCEDURE — 82803 BLOOD GASES ANY COMBINATION: CPT

## 2020-02-18 PROCEDURE — 83880 ASSAY OF NATRIURETIC PEPTIDE: CPT

## 2020-02-18 PROCEDURE — 85014 HEMATOCRIT: CPT

## 2020-02-18 PROCEDURE — 93005 ELECTROCARDIOGRAM TRACING: CPT | Performed by: PERSONAL EMERGENCY RESPONSE ATTENDANT

## 2020-02-18 PROCEDURE — 99285 EMERGENCY DEPT VISIT HI MDM: CPT

## 2020-02-18 RX ORDER — 0.9 % SODIUM CHLORIDE 0.9 %
500 INTRAVENOUS SOLUTION INTRAVENOUS ONCE
Status: COMPLETED | OUTPATIENT
Start: 2020-02-18 | End: 2020-02-19

## 2020-02-18 RX ADMIN — SODIUM CHLORIDE 500 ML: 9 INJECTION, SOLUTION INTRAVENOUS at 20:08

## 2020-02-18 RX ADMIN — IOPAMIDOL 100 ML: 755 INJECTION, SOLUTION INTRAVENOUS at 21:23

## 2020-02-18 ASSESSMENT — ENCOUNTER SYMPTOMS
DIARRHEA: 0
VOMITING: 0
SORE THROAT: 0
NAUSEA: 0
ABDOMINAL PAIN: 0
RHINORRHEA: 0
COUGH: 0
SHORTNESS OF BREATH: 0
BLOOD IN STOOL: 0
COLOR CHANGE: 0

## 2020-02-18 ASSESSMENT — PAIN DESCRIPTION - LOCATION: LOCATION: ARM;ABDOMEN

## 2020-02-18 ASSESSMENT — PAIN DESCRIPTION - RADICULAR PAIN: RADICULAR_PAIN: ABSENT

## 2020-02-18 ASSESSMENT — PAIN DESCRIPTION - FREQUENCY: FREQUENCY: CONTINUOUS

## 2020-02-18 ASSESSMENT — PAIN SCALES - GENERAL: PAINLEVEL_OUTOF10: 2

## 2020-02-18 ASSESSMENT — PAIN DESCRIPTION - PAIN TYPE: TYPE: ACUTE PAIN

## 2020-02-18 ASSESSMENT — PAIN DESCRIPTION - DESCRIPTORS: DESCRIPTORS: TENDER

## 2020-02-18 ASSESSMENT — PAIN DESCRIPTION - ORIENTATION: ORIENTATION: RIGHT

## 2020-02-18 NOTE — ED PROVIDER NOTES
[address] [facility] ED  eMERGENCY dEPARTMENT eNCOUnter      Pt Name: Jose Miguel Echeverria  MRN: [id number]  [place] 1934  Date of evaluation: 2/18/2020  Provider: REESE García      HISTORY OF PRESENT ILLNESS    Jose Miguel Echeverria is a 80 y.o. female with PMHx of COPD, hyperlipidemia, generalized weakness presents to the emergency department with change in mental status. Pt from [employer], on O2 2L continuous. She is baseline  A&Ox3 and today noted to be A&Ox1, difficult to arouse which patient states she is just tired. Pt has had a recent fall but provides different dates for falls from 3 days ago to yesterday. She doesn't know how she fell. She states she hit her head with LOC. She is normally ambulatory without assistance and has been ambulating minimally due to generalized weakness. She states she has RLE but this has been present for months. She does get dizzy from laying to sitting up position. She denies headaches, decreased appetite, cough, CP, SOB, abd pain pain, N/V/D, urinary sx. No neck or back pain. No pain complaints. Sister has arrived who sees patient daily and denies any confusion. She states right lower extremity weakness is chronic. FULL CODE    HPI    Nursing Notes were reviewed. REVIEW OF SYSTEMS       Review of Systems   Constitutional: Negative for appetite change, chills and fever. HENT: Negative for congestion, rhinorrhea and sore throat. Respiratory: Negative for cough and shortness of breath. Cardiovascular: Negative for chest pain. Gastrointestinal: Negative for abdominal pain, blood in stool, diarrhea, nausea and vomiting. Genitourinary: Negative for difficulty urinating. Musculoskeletal: Positive for gait problem. Negative for neck stiffness. Skin: Negative for color change and rash. Neurological: Positive for weakness. Negative for dizziness, syncope, light-headedness, numbness and headaches. All other systems reviewed and are negative.             PAST MEDICAL HISTORY     Past Medical History:   Diagnosis Date    COPD (chronic obstructive pulmonary disease) ([facility] [address]          SURGICAL HISTORY       Past Surgical History:   Procedure Laterality Date    BACK SURGERY           CURRENT MEDICATIONS       Previous Medications    ALBUTEROL (PROVENTIL) (2.5 MG/3ML) 0.083% NEBULIZER SOLUTION    Take 2.5 mg by nebulization every 4 hours as needed for Wheezing     ASPIRIN 81 MG EC TABLET    Take 1 tablet by mouth daily    ATORVASTATIN (LIPITOR) 20 MG TABLET    Take 1 tablet by mouth nightly    METOPROLOL TARTRATE (LOPRESSOR) 50 MG TABLET    Take 1 tablet by mouth 2 times daily    PREDNISOLONE ACETATE (PRED FORTE) 1 % OPHTHALMIC SUSPENSION    Place 1 drop into the right eye daily    VALACYCLOVIR (VALTREX) 500 MG TABLET    Take 500 mg by mouth daily       ALLERGIES     Aspirin; Ibuprofen; and Penicillins    FAMILY HISTORY     History reviewed. No pertinent family history. SOCIAL HISTORY       Social History     Socioeconomic History    Marital status:       Spouse name: None    Number of children: None    Years of education: None    Highest education level: None   Occupational History    None   Social Needs    Financial resource strain: None    Food insecurity:     Worry: None     Inability: None    Transportation needs:     Medical: None     Non-medical: None   Tobacco Use    Smoking status: Former Smoker    Smokeless tobacco: Never Used   Substance and Sexual Activity    Alcohol use: No    Drug use: No    Sexual activity: None   Lifestyle    Physical activity:     Days per week: None     Minutes per session: None    Stress: None   Relationships    Social connections:     Talks on phone: None     Gets together: None     Attends Yazidi service: None     Active member of club or organization: None     Attends meetings of clubs or organizations: None     Relationship status: None    Intimate partner violence:     Fear of current or ex partner: None

## 2020-02-18 NOTE — ED NOTES
EKG done and given to [facility]. Pt placed on continuous cardiac monitor. Tele strip printed and mounted.      Kenneth Mcneill RN  02/18/20 0600

## 2020-02-18 NOTE — DISCHARGE INSTR - COC
output data in the 24 hours ending 20 1816  No intake/output data recorded. Safety Concerns: At Risk for Falls    Impairments/Disabilities:      None    Nutrition Therapy:  Current Nutrition Therapy:   - Oral Diet:  Renal    Routes of Feeding: Oral  Liquids: Thin Liquids  Daily Fluid Restriction: no  Last Modified Barium Swallow with Video (Video Swallowing Test): not done    Treatments at the Time of Hospital Discharge:   Respiratory Treatments: ***  Oxygen Therapy:  {Therapy; copd oxygen:56852}  Ventilator:    {[facility] Vent QTGQ:993396599}    Rehab Therapies: {THERAPEUTIC INTERVENTION:5445577908}  Weight Bearing Status/Restrictions: {[facility] Weight Bearin}  Other Medical Equipment (for information only, NOT a DME order):  {EQUIPMENT:462993942}  Other Treatments: ***    Patient's personal belongings (please select all that are sent with patient):  {P DME Belongings:609640486}    RN SIGNATURE:  {Esignature:476516408}    CASE MANAGEMENT/SOCIAL WORK SECTION    Inpatient Status Date: ***    Readmission Risk Assessment Score:  Readmission Risk              Risk of Unplanned Readmission:        0           Discharging to Facility/ Agency   · Name: [employer]  · Address:  · Phone:[phone]  · Fax:    Dialysis Facility (if applicable)   · Name:  · Address:  · Dialysis Schedule:  · Phone:  · Fax:    / signature: Electronically signed by JOSEFINA Meadows on 20 at 12:02 PM      PHYSICIAN SECTION    Prognosis: Good    Condition at Discharge: Stable    Rehab Potential (if transferring to Rehab): Good    Recommended Labs or Other Treatments After Discharge: none    Physician Certification: I certify the above information and transfer of Elsi Thakkar  is necessary for the continuing treatment of the diagnosis listed and that she requires [facility] for less 30 days.      Update Admission H&P: No change in H&P    PHYSICIAN SIGNATURE:  Electronically signed by Anabell Haney MD on 2/21/20 at 10:56 AM

## 2020-02-19 ENCOUNTER — APPOINTMENT (OUTPATIENT)
Dept: GENERAL RADIOLOGY | Age: 85
DRG: 189 | End: 2020-02-19
Payer: MEDICARE

## 2020-02-19 PROBLEM — R41.0 DISORIENTATION: Status: ACTIVE | Noted: 2020-02-19
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
## 2020-02-19 PROBLEM — S22.41XA CLOSED FRACTURE OF MULTIPLE RIBS OF RIGHT SIDE: Status: ACTIVE | Noted: 2020-02-19

## 2020-02-19 LAB
ANION GAP SERPL CALCULATED.3IONS-SCNC: 10 MEQ/L (ref 9–15)
ANION GAP SERPL CALCULATED.3IONS-SCNC: 9 MEQ/L (ref 9–15)
BASE EXCESS ARTERIAL: 14 (ref -3–3)
BUN BLDV-MCNC: 13 MG/DL (ref 8–23)
BUN BLDV-MCNC: 13 MG/DL (ref 8–23)
CALCIUM IONIZED: 1.25 MMOL/L (ref 1.12–1.32)
CALCIUM SERPL-MCNC: 8.8 MG/DL (ref 8.5–9.9)
CALCIUM SERPL-MCNC: 9 MG/DL (ref 8.5–9.9)
CHLORIDE BLD-SCNC: 93 MEQ/L (ref 95–107)
CHLORIDE BLD-SCNC: 99 MEQ/L (ref 95–107)
CO2: 35 MEQ/L (ref 20–31)
CO2: 36 MEQ/L (ref 20–31)
CREAT SERPL-MCNC: 0.43 MG/DL (ref 0.5–0.9)
CREAT SERPL-MCNC: 0.45 MG/DL (ref 0.5–0.9)
GFR AFRICAN AMERICAN: >60
GFR NON-AFRICAN AMERICAN: >60
GLUCOSE BLD-MCNC: 132 MG/DL (ref 60–115)
GLUCOSE BLD-MCNC: 82 MG/DL (ref 70–99)
GLUCOSE BLD-MCNC: 89 MG/DL (ref 70–99)
HCO3 ARTERIAL: 39.9 MMOL/L (ref 21–29)
HEMOGLOBIN: 11.5 GM/DL (ref 12–16)
LACTATE: 0.99 MMOL/L (ref 0.4–2)
LACTIC ACID: 1.4 MMOL/L (ref 0.5–2.2)
O2 SAT, ARTERIAL: 98 % (ref 93–100)
PCO2 ARTERIAL: 77 MM HG (ref 35–45)
PERFORMED ON: ABNORMAL
PERFORMED ON: NORMAL
PH ARTERIAL: 7.32 (ref 7.35–7.45)
PO2 ARTERIAL: 126 MM HG (ref 75–108)
POC CHLORIDE: 96 MEQ/L (ref 99–110)
POC CREATININE: 0.7 MG/DL (ref 0.6–1.2)
POC CREATININE: 0.8 MG/DL (ref 0.6–1.2)
POC FIO2: 40
POC HEMATOCRIT: 34 % (ref 36–48)
POC POTASSIUM: 3.8 MEQ/L (ref 3.5–5.1)
POC SAMPLE TYPE: ABNORMAL
POC SAMPLE TYPE: NORMAL
POC SODIUM: 143 MEQ/L (ref 136–145)
POTASSIUM REFLEX MAGNESIUM: 3.8 MEQ/L (ref 3.4–4.9)
POTASSIUM REFLEX MAGNESIUM: 3.9 MEQ/L (ref 3.4–4.9)
SODIUM BLD-SCNC: 138 MEQ/L (ref 135–144)
SODIUM BLD-SCNC: 144 MEQ/L (ref 135–144)
TCO2 ARTERIAL: 42 (ref 22–29)
TROPONIN: 0.02 NG/ML (ref 0–0.01)

## 2020-02-19 PROCEDURE — 97165 OT EVAL LOW COMPLEX 30 MIN: CPT

## 2020-02-19 PROCEDURE — 85014 HEMATOCRIT: CPT

## 2020-02-19 PROCEDURE — 6370000000 HC RX 637 (ALT 250 FOR IP): Performed by: INTERNAL MEDICINE

## 2020-02-19 PROCEDURE — 97162 PT EVAL MOD COMPLEX 30 MIN: CPT

## 2020-02-19 PROCEDURE — 94640 AIRWAY INHALATION TREATMENT: CPT

## 2020-02-19 PROCEDURE — 93005 ELECTROCARDIOGRAM TRACING: CPT | Performed by: INTERNAL MEDICINE

## 2020-02-19 PROCEDURE — 94664 DEMO&/EVAL PT USE INHALER: CPT

## 2020-02-19 PROCEDURE — 82435 ASSAY OF BLOOD CHLORIDE: CPT

## 2020-02-19 PROCEDURE — 1210000000 HC MED SURG R&B

## 2020-02-19 PROCEDURE — 94660 CPAP INITIATION&MGMT: CPT

## 2020-02-19 PROCEDURE — 2700000000 HC OXYGEN THERAPY PER DAY

## 2020-02-19 PROCEDURE — 6370000000 HC RX 637 (ALT 250 FOR IP): Performed by: HOSPITALIST

## 2020-02-19 PROCEDURE — 6360000002 HC RX W HCPCS: Performed by: HOSPITALIST

## 2020-02-19 PROCEDURE — 82565 ASSAY OF CREATININE: CPT

## 2020-02-19 PROCEDURE — 94761 N-INVAS EAR/PLS OXIMETRY MLT: CPT

## 2020-02-19 PROCEDURE — 84132 ASSAY OF SERUM POTASSIUM: CPT

## 2020-02-19 PROCEDURE — 84484 ASSAY OF TROPONIN QUANT: CPT

## 2020-02-19 PROCEDURE — 83605 ASSAY OF LACTIC ACID: CPT

## 2020-02-19 PROCEDURE — 84295 ASSAY OF SERUM SODIUM: CPT

## 2020-02-19 PROCEDURE — 36600 WITHDRAWAL OF ARTERIAL BLOOD: CPT

## 2020-02-19 PROCEDURE — 93010 ELECTROCARDIOGRAM REPORT: CPT | Performed by: INTERNAL MEDICINE

## 2020-02-19 PROCEDURE — 80048 BASIC METABOLIC PNL TOTAL CA: CPT

## 2020-02-19 PROCEDURE — 2500000003 HC RX 250 WO HCPCS: Performed by: INTERNAL MEDICINE

## 2020-02-19 PROCEDURE — 2580000003 HC RX 258: Performed by: HOSPITALIST

## 2020-02-19 PROCEDURE — 82330 ASSAY OF CALCIUM: CPT

## 2020-02-19 PROCEDURE — 71045 X-RAY EXAM CHEST 1 VIEW: CPT

## 2020-02-19 PROCEDURE — 36415 COLL VENOUS BLD VENIPUNCTURE: CPT

## 2020-02-19 PROCEDURE — 6360000002 HC RX W HCPCS: Performed by: INTERNAL MEDICINE

## 2020-02-19 PROCEDURE — 82803 BLOOD GASES ANY COMBINATION: CPT

## 2020-02-19 PROCEDURE — 97166 OT EVAL MOD COMPLEX 45 MIN: CPT

## 2020-02-19 RX ORDER — DOXYCYCLINE HYCLATE 50 MG/1
100 CAPSULE ORAL EVERY 12 HOURS SCHEDULED
Status: DISCONTINUED | OUTPATIENT
Start: 2020-02-19 | End: 2020-02-21 | Stop reason: HOSPADM

## 2020-02-19 RX ORDER — TRAMADOL HYDROCHLORIDE 50 MG/1
50 TABLET ORAL EVERY 6 HOURS PRN
Status: DISCONTINUED | OUTPATIENT
Start: 2020-02-19 | End: 2020-02-21 | Stop reason: HOSPADM

## 2020-02-19 RX ORDER — METOPROLOL TARTRATE 5 MG/5ML
5 INJECTION INTRAVENOUS EVERY 5 MIN PRN
Status: COMPLETED | OUTPATIENT
Start: 2020-02-19 | End: 2020-02-19

## 2020-02-19 RX ORDER — FUROSEMIDE 10 MG/ML
40 INJECTION INTRAMUSCULAR; INTRAVENOUS ONCE
Status: COMPLETED | OUTPATIENT
Start: 2020-02-19 | End: 2020-02-19

## 2020-02-19 RX ORDER — ONDANSETRON 2 MG/ML
4 INJECTION INTRAMUSCULAR; INTRAVENOUS EVERY 6 HOURS PRN
Status: DISCONTINUED | OUTPATIENT
Start: 2020-02-19 | End: 2020-02-21 | Stop reason: HOSPADM

## 2020-02-19 RX ORDER — METOPROLOL TARTRATE 50 MG/1
50 TABLET, FILM COATED ORAL 2 TIMES DAILY
Status: DISCONTINUED | OUTPATIENT
Start: 2020-02-19 | End: 2020-02-21 | Stop reason: HOSPADM

## 2020-02-19 RX ORDER — SODIUM CHLORIDE 0.9 % (FLUSH) 0.9 %
10 SYRINGE (ML) INJECTION PRN
Status: DISCONTINUED | OUTPATIENT
Start: 2020-02-19 | End: 2020-02-21 | Stop reason: HOSPADM

## 2020-02-19 RX ORDER — BUDESONIDE AND FORMOTEROL FUMARATE DIHYDRATE 160; 4.5 UG/1; UG/1
2 AEROSOL RESPIRATORY (INHALATION) 2 TIMES DAILY
Status: DISCONTINUED | OUTPATIENT
Start: 2020-02-19 | End: 2020-02-21 | Stop reason: HOSPADM

## 2020-02-19 RX ORDER — ACYCLOVIR 400 MG/1
400 TABLET ORAL 2 TIMES DAILY
Status: DISCONTINUED | OUTPATIENT
Start: 2020-02-19 | End: 2020-02-21 | Stop reason: HOSPADM

## 2020-02-19 RX ORDER — METHYLPREDNISOLONE SODIUM SUCCINATE 40 MG/ML
40 INJECTION, POWDER, LYOPHILIZED, FOR SOLUTION INTRAMUSCULAR; INTRAVENOUS EVERY 8 HOURS
Status: DISCONTINUED | OUTPATIENT
Start: 2020-02-19 | End: 2020-02-21 | Stop reason: HOSPADM

## 2020-02-19 RX ORDER — FAMOTIDINE 20 MG/1
20 TABLET, FILM COATED ORAL 2 TIMES DAILY
Status: DISCONTINUED | OUTPATIENT
Start: 2020-02-19 | End: 2020-02-21 | Stop reason: HOSPADM

## 2020-02-19 RX ORDER — ACETAMINOPHEN 325 MG/1
650 TABLET ORAL EVERY 4 HOURS PRN
Status: DISCONTINUED | OUTPATIENT
Start: 2020-02-19 | End: 2020-02-21 | Stop reason: HOSPADM

## 2020-02-19 RX ORDER — ASPIRIN 81 MG/1
81 TABLET ORAL DAILY
Status: DISCONTINUED | OUTPATIENT
Start: 2020-02-19 | End: 2020-02-21 | Stop reason: HOSPADM

## 2020-02-19 RX ORDER — IPRATROPIUM BROMIDE AND ALBUTEROL SULFATE 2.5; .5 MG/3ML; MG/3ML
1 SOLUTION RESPIRATORY (INHALATION)
Status: DISCONTINUED | OUTPATIENT
Start: 2020-02-19 | End: 2020-02-21 | Stop reason: HOSPADM

## 2020-02-19 RX ORDER — ALBUTEROL SULFATE 2.5 MG/3ML
2.5 SOLUTION RESPIRATORY (INHALATION) EVERY 4 HOURS PRN
Status: DISCONTINUED | OUTPATIENT
Start: 2020-02-19 | End: 2020-02-19

## 2020-02-19 RX ORDER — PREDNISOLONE ACETATE 10 MG/ML
1 SUSPENSION/ DROPS OPHTHALMIC DAILY
Status: DISCONTINUED | OUTPATIENT
Start: 2020-02-19 | End: 2020-02-21 | Stop reason: HOSPADM

## 2020-02-19 RX ORDER — SODIUM CHLORIDE 0.9 % (FLUSH) 0.9 %
10 SYRINGE (ML) INJECTION EVERY 12 HOURS SCHEDULED
Status: DISCONTINUED | OUTPATIENT
Start: 2020-02-19 | End: 2020-02-21 | Stop reason: HOSPADM

## 2020-02-19 RX ORDER — ATORVASTATIN CALCIUM 20 MG/1
20 TABLET, FILM COATED ORAL NIGHTLY
Status: DISCONTINUED | OUTPATIENT
Start: 2020-02-19 | End: 2020-02-21 | Stop reason: HOSPADM

## 2020-02-19 RX ORDER — SODIUM CHLORIDE 9 MG/ML
INJECTION, SOLUTION INTRAVENOUS CONTINUOUS
Status: DISCONTINUED | OUTPATIENT
Start: 2020-02-19 | End: 2020-02-20

## 2020-02-19 RX ADMIN — FAMOTIDINE 20 MG: 20 TABLET ORAL at 08:27

## 2020-02-19 RX ADMIN — METOPROLOL TARTRATE 50 MG: 50 TABLET, FILM COATED ORAL at 17:39

## 2020-02-19 RX ADMIN — DOXYCYCLINE HYCLATE 100 MG: 50 CAPSULE, GELATIN COATED ORAL at 20:37

## 2020-02-19 RX ADMIN — FUROSEMIDE 40 MG: 10 INJECTION, SOLUTION INTRAMUSCULAR; INTRAVENOUS at 16:26

## 2020-02-19 RX ADMIN — METOPROLOL TARTRATE 5 MG: 5 INJECTION INTRAVENOUS at 06:39

## 2020-02-19 RX ADMIN — IPRATROPIUM BROMIDE AND ALBUTEROL SULFATE 1 AMPULE: .5; 3 SOLUTION RESPIRATORY (INHALATION) at 12:02

## 2020-02-19 RX ADMIN — METOPROLOL TARTRATE 5 MG: 5 INJECTION INTRAVENOUS at 06:53

## 2020-02-19 RX ADMIN — Medication 10 ML: at 20:37

## 2020-02-19 RX ADMIN — IPRATROPIUM BROMIDE AND ALBUTEROL SULFATE 1 AMPULE: .5; 3 SOLUTION RESPIRATORY (INHALATION) at 19:30

## 2020-02-19 RX ADMIN — SODIUM CHLORIDE: 9 INJECTION, SOLUTION INTRAVENOUS at 13:17

## 2020-02-19 RX ADMIN — METHYLPREDNISOLONE SODIUM SUCCINATE 40 MG: 40 INJECTION, POWDER, FOR SOLUTION INTRAMUSCULAR; INTRAVENOUS at 10:50

## 2020-02-19 RX ADMIN — DOXYCYCLINE HYCLATE 100 MG: 50 CAPSULE, GELATIN COATED ORAL at 10:50

## 2020-02-19 RX ADMIN — FAMOTIDINE 20 MG: 20 TABLET ORAL at 01:41

## 2020-02-19 RX ADMIN — BUDESONIDE AND FORMOTEROL FUMARATE DIHYDRATE 2 PUFF: 160; 4.5 AEROSOL RESPIRATORY (INHALATION) at 10:00

## 2020-02-19 RX ADMIN — ASPIRIN 81 MG: 81 TABLET, COATED ORAL at 08:27

## 2020-02-19 RX ADMIN — METHYLPREDNISOLONE SODIUM SUCCINATE 40 MG: 40 INJECTION, POWDER, FOR SOLUTION INTRAMUSCULAR; INTRAVENOUS at 17:39

## 2020-02-19 RX ADMIN — SODIUM CHLORIDE: 9 INJECTION, SOLUTION INTRAVENOUS at 01:41

## 2020-02-19 RX ADMIN — METOPROLOL TARTRATE 50 MG: 50 TABLET, FILM COATED ORAL at 05:45

## 2020-02-19 RX ADMIN — ATORVASTATIN CALCIUM 20 MG: 20 TABLET, FILM COATED ORAL at 20:37

## 2020-02-19 RX ADMIN — METOPROLOL TARTRATE 5 MG: 5 INJECTION INTRAVENOUS at 07:07

## 2020-02-19 RX ADMIN — FAMOTIDINE 20 MG: 20 TABLET ORAL at 20:38

## 2020-02-19 RX ADMIN — PREDNISOLONE ACETATE 1 DROP: 10 SUSPENSION/ DROPS OPHTHALMIC at 08:28

## 2020-02-19 RX ADMIN — ENOXAPARIN SODIUM 40 MG: 40 INJECTION SUBCUTANEOUS at 08:27

## 2020-02-19 RX ADMIN — ACYCLOVIR 400 MG: 400 TABLET ORAL at 20:37

## 2020-02-19 RX ADMIN — ACYCLOVIR 400 MG: 400 TABLET ORAL at 08:27

## 2020-02-19 ASSESSMENT — PAIN SCALES - GENERAL
PAINLEVEL_OUTOF10: 0

## 2020-02-19 NOTE — PROGRESS NOTES
[facility] EMERGENCY [facility] AT [place] Respiratory Therapy Evaluation   Current Order: albuterol q4prn      Home Regimen:yes      Ordering Physician: Rosana  Re-evaluation Date: 7/22     Diagnosis: resp failure      Patient Status: Stable / Unstable + Physician notified    The following MDI Criteria must be met in order to convert aerosol to MDI with spacer. If unable to meet, MDI will be converted to aerosol:  []  Patient able to demonstrate the ability to use MDI effectively  []  Patient alert and cooperative  []  Patient able to take deep breath with 5-10 second hold  []  Medication(s) available in this delivery method   []  Peak flow greater than or equal to 200 ml/min            Current Order Substituted To  (same drug, same frequency)   Aerosol to MDI [] Albuterol Sulfate 0.083% unit dose by aerosol Albuterol Sulfate MDI 2 puffs by inhalation with spacer    [] Levalbuterol 1.25 mg unit dose by aerosol Levalbuterol MDI 2 puffs by inhalation with spacer    [] Levalbuterol 0.63 mg unit dose by aerosol Levalbuterol MDI 2 puffs by inhalation with spacer    [] Ipratropium Bromide 0.02% unit dose by aerosol Ipratropium Bromide MDI 2 puffs by inhalation with spacer    [] Duoneb (Ipratropium + Albuterol) unit dose by aerosol Ipratropium MDI + Albuterol MDI 2 puffs by inhalation w/spacer   MDI to Aerosol [] Albuterol Sulfate MDI Albuterol Sulfate 0.083% unit dose by aerosol    [] Levalbuterol MDI 2 puffs by inhalation Levalbuterol 1.25 mg unit dose by aerosol    [] Ipratropium Bromide MDI by inhalation Ipratropium Bromide 0.02% unit dose by aerosol    [] Combivent (Ipratropium + Albuterol) MDI by inhalation Duoneb (Ipratropium + Albuterol) unit dose by aerosol   Treatment Assessment [Frequency/Schedule]:  Change frequency to: no change __________________________________________________per Protocol, P&T, MEC      Points 0 1 2 3 4   Pulmonary Status  Non-Smoker  []   Smoking history   < 20 pack years  []   Smoking history  ?  20 pack years  [] Pulmonary Disorder  (acute or chronic)  [x]   Severe or Chronic w/ Exacerbation  []     Surgical Status No [x]   Surgeries     General []   Surgery Lower []   Abdominal Thoracic or []   Upper Abdominal Thoracic with  PulmonaryDisorder  []     Chest X-ray Clear/Not  Ordered     [x]  Chronic Changes  Results Pending  []  Infiltrates, atelectasis, pleural effusion, or edema  []  Infiltrates in more than one lobe []  Infiltrate + Atelectasis, &/or pleural effusion  []    Respiratory Pattern Regular,  RR = 12-20 [x]  Increased,  RR = 21-25 []  POWELL, irregular,  or RR = 26-30 []  Decreased FEV1  or RR = 31-35 []  Severe SOB, use  of accessory muscles, or RR ? 35  []    Mental Status Alert, oriented,  Cooperative [x]  Confused but Follows commands []  Lethargic or unable to follow commands []  Obtunded  []  Comatose  []    Breath Sounds Clear to  auscultation  []  Decreased unilaterally or  in bases only [x]  Decreased  bilaterally  []  Crackles or intermittent wheezes []  Wheezes []    Cough Strong, Spontan., & nonproductive [x]  Strong,  spontaneous, &  productive []  Weak,  Nonproductive []  Weak, productive or  with wheezes []  No spontaneous  cough or may require suctioning []    Level of Activity Ambulatory []  Ambulatory w/ Assist  [x]  Non-ambulatory []  Paraplegic []  Quadriplegic []    Total    Score:___5____     Triage Score:_5_______      Tri       Triage:     1. (>20) Freq: Q3    2. (16-20) Freq: Q4   3. (11-15) Freq: QID & Albuterol Q2 PRN    4. (6-10) Freq: TID & Albuterol Q2 PRN    5. (0-5) Freq Q4prn

## 2020-02-19 NOTE — PLAN OF CARE
See OT evaluation for all goals and OT POC.  Electronically signed by SUSAN Ross/L on 2/19/2020 at 10:48 AM

## 2020-02-19 NOTE — H&P
daily and has not noticed any confusion. Sister states patient does have chronic right lower extremity weakness and that patient is a full code. Unable to do ROS at this time. PAST MEDICAL HISTORY:    Past Medical History:   Diagnosis Date    Chronic diastolic CHF (congestive heart failure) ([facility] [address]     per echo 2/5/20    Compression fracture of L3 vertebra (HCC)     COPD (chronic obstructive pulmonary disease) (HCC)     Emphysema lung (HCC)     diffuse    HLD (hyperlipidemia)     Lung nodules     OA (osteoarthritis)     On home O2     Osteoporosis     Spinal stenosis of lumbar region with neurogenic claudication     Spondylosis     Tachycardia     Uveitis      PAST SURGICAL HISTORY:    Past Surgical History:   Procedure Laterality Date    KYPHOSIS SURGERY      T12     FAMILY HISTORY:    Family History   Family history unknown: Yes     SOCIAL HISTORY:    Social History     Socioeconomic History    Marital status:       Spouse name: Not on file    Number of children: Not on file    Years of education: Not on file    Highest education level: Not on file   Occupational History    Not on file   Social Needs    Financial resource strain: Not on file    Food insecurity:     Worry: Not on file     Inability: Not on file    Transportation needs:     Medical: Not on file     Non-medical: Not on file   Tobacco Use    Smoking status: Former Smoker    Smokeless tobacco: Never Used   Substance and Sexual Activity    Alcohol use: No    Drug use: No    Sexual activity: Not on file   Lifestyle    Physical activity:     Days per week: Not on file     Minutes per session: Not on file    Stress: Not on file   Relationships    Social connections:     Talks on phone: Not on file     Gets together: Not on file     Attends Evangelical service: Not on file     Active member of club or organization: Not on file     Attends meetings of clubs or organizations: Not on file     Relationship status: Not on up  NEUROLOGIC: Fatigued, alert, oriented to name, place and time except for year but knew day and month and president. Cranial nerves II-XII are grossly intact. SKIN: Patient has significant bruising on the right chest wall below the breast wrapping around to the flank and very slightly to the right mid back, significant bruising noted to the right arm and hand, no bruising noted to the face or head    DATA:     Diagnostic tests reviewed for today's visit:    Most recent labs and imaging results reviewed.      LABS:    Recent Results (from the past 24 hour(s))   EKG 12 Lead    Collection Time: 02/18/20  6:28 PM   Result Value Ref Range    Ventricular Rate 78 BPM    Atrial Rate 68 BPM    P-R Interval 112 ms    QRS Duration 74 ms    Q-T Interval 360 ms    QTc Calculation (Bazett) 410 ms    R Axis 19 degrees    T Axis 54 degrees   APTT    Collection Time: 02/18/20  6:30 PM   Result Value Ref Range    aPTT 25.1 24.4 - 36.8 sec   Brain Natriuretic Peptide    Collection Time: 02/18/20  6:30 PM   Result Value Ref Range    Pro- pg/mL   CBC Auto Differential    Collection Time: 02/18/20  6:30 PM   Result Value Ref Range    WBC 9.0 4.8 - 10.8 K/uL    RBC 3.49 (L) 4.20 - 5.40 M/uL    Hemoglobin 11.2 (L) 12.0 - 16.0 g/dL    Hematocrit 35.1 (L) 37.0 - 47.0 %    .5 (H) 82.0 - 100.0 fL    MCH 32.1 (H) 27.0 - 31.3 pg    MCHC 31.9 (L) 33.0 - 37.0 %    RDW 14.1 11.5 - 14.5 %    Platelets 978 430 - 071 K/uL    Neutrophils % 85.5 %    Lymphocytes % 4.8 %    Monocytes % 9.4 %    Eosinophils % 0.1 %    Basophils % 0.2 %    Neutrophils Absolute 7.7 (H) 1.4 - 6.5 K/uL    Lymphocytes Absolute 0.4 (L) 1.0 - 4.8 K/uL    Monocytes Absolute 0.8 0.2 - 0.8 K/uL    Eosinophils Absolute 0.0 0.0 - 0.7 K/uL    Basophils Absolute 0.0 0.0 - 0.2 K/uL   CK    Collection Time: 02/18/20  6:30 PM   Result Value Ref Range    Total CK 25 0 - 170 U/L   Comprehensive Metabolic Panel    Collection Time: 02/18/20  6:30 PM   Result Value Ref Range Sodium 146 (H) 135 - 144 mEq/L    Potassium 4.4 3.4 - 4.9 mEq/L    Chloride 97 95 - 107 mEq/L    CO2 40 (HH) 20 - 31 mEq/L    Anion Gap 9 9 - 15 mEq/L    Glucose 133 (H) 70 - 99 mg/dL    BUN 17 8 - 23 mg/dL    CREATININE 0.51 0.50 - 0.90 mg/dL    GFR Non-African American >60.0 >60    GFR  >60.0 >60    Calcium 9.5 8.5 - 9.9 mg/dL    Total Protein 6.3 6.3 - 8.0 g/dL    Alb 3.6 3.5 - 4.6 g/dL    Total Bilirubin <0.2 0.2 - 0.7 mg/dL    Alkaline Phosphatase 89 40 - 130 U/L    ALT 27 0 - 33 U/L    AST 18 0 - 35 U/L    Globulin 2.7 2.3 - 3.5 g/dL   Lactate, Sepsis    Collection Time: 02/18/20  6:30 PM   Result Value Ref Range    Lactic Acid, Sepsis 1.4 0.5 - 1.9 mmol/L   Lipase    Collection Time: 02/18/20  6:30 PM   Result Value Ref Range    Lipase 37 12 - 95 U/L   Magnesium    Collection Time: 02/18/20  6:30 PM   Result Value Ref Range    Magnesium 2.2 1.7 - 2.4 mg/dL   Procalcitonin    Collection Time: 02/18/20  6:30 PM   Result Value Ref Range    Procalcitonin 0.03 0.00 - 0.15 ng/mL   Protime-INR    Collection Time: 02/18/20  6:30 PM   Result Value Ref Range    Protime 13.6 12.3 - 14.9 sec    INR 1.0    Troponin    Collection Time: 02/18/20  6:30 PM   Result Value Ref Range    Troponin <0.010 0.000 - 0.010 ng/mL   TSH without Reflex    Collection Time: 02/18/20  6:30 PM   Result Value Ref Range    TSH 0.223 (L) 0.440 - 3.860 uIU/mL   Urinalysis Reflex to Culture    Collection Time: 02/18/20  6:30 PM   Result Value Ref Range    Color, UA Yellow Straw/Yellow    Clarity, UA CLOUDY (A) Clear    Glucose, Ur 250 (A) Negative mg/dL    Bilirubin Urine Negative Negative    Ketones, Urine Negative Negative mg/dL    Specific Gravity, UA 1.017 1.005 - 1.030    Blood, Urine Negative Negative    pH, UA 6.5 5.0 - 9.0    Protein, UA Negative Negative mg/dL    Urobilinogen, Urine 0.2 <2.0 E.U./dL    Nitrite, Urine Negative Negative    Leukocyte Esterase, Urine TRACE (A) Negative    Urine Reflex to Culture YES Microscopic Urinalysis    Collection Time: 02/18/20  6:30 PM   Result Value Ref Range    Bacteria, UA Negative Negative /HPF    Hyaline Casts, UA 3-5 0 - 5 /HPF    WBC, UA 6-10 (A) 0 - 5 /HPF    Epi Cells 10-20 0 - 5 /HPF   POCT CREATININE    Collection Time: 02/18/20  6:38 PM   Result Value Ref Range    POC CREATININE WHOLE BLOOD 0.7    POCT Venous    Collection Time: 02/18/20  9:46 PM   Result Value Ref Range    POC Sodium 142 136 - 145 mEq/L    POC Potassium 3.9 3.5 - 5.1 mEq/L    POC Chloride 95 (L) 99 - 110 mEq/L    POC Glucose 119 (H) 60 - 115 mg/dl    POC Creatinine 0.7 0.6 - 1.2 mg/dL    GFR Non-African American >60 >60    GFR African American >60 >60    Calcium, Ion 1.19 1.12 - 1.32 mmol/L    pH, Kirsty 7.309 (L) 7.350 - 7.450    pCO2, Kirsty 82.4 (H) 40.0 - 50.0 mm Hg    pO2, Kirsty 51 Not Established mm Hg    HCO3, Venous 41.3 (H) 23.0 - 29.0 mmol/L    Base Excess, Kirsty 15 (H) -3 - 3    O2 Sat, Kirsty 80 Not Established %    TC02 (Calc), Kirsty 44 Not Established mmol/L    Lactate 3.03 (H) 0.40 - 2.00 mmol/L    Hemoglobin 12.9 12.0 - 16.0 gm/dL    POC Hematocrit 38 36 - 48 %    Sample Type KIRSTY     Performed on SEE BELOW        IMAGING:  No results found.     VTE Prophylaxis: low molecular weight heparin -  start    ASSESSMENT AND PLAN  Principal Problem:    Acute respiratory failure with hypoxia and hypercarbia   Assessment: Hypoxia is chronic, Patient has been having shallow respirations likely secondary to pain from rib fractures causing retention of CO2 and relative hypoxia, history of COPD although patient does not appear to be having an exacerbation, just treated for COPD exacerbation week and a half ago, patient's mental status is already improved on BiPAP, procalcitonin negative, afebrile, no WBC suggestive of infection, CO2 40, P CO2 82, pH 7.309, HCO3 - 41 was patient is trying to compensate, mild lactic acidosis which has improved  Plan:   [facility]  DC BiPAP and try high flow oxygen as patient not tolerating BiPAP  Maintain oxygen saturation greater than 90%  No need for repeat ABG unless patient declines      Disorientation  Assessment: Most likely secondary to CO2 retention, patient has already improved on BiPAP  Plan:   Neurochecks  Reorient as needed      Falls    Closed fracture of multiple ribs of right side  Assessment: Patient had a fall roughly 2 weeks ago and a fall again today, chronic right lower extremity weakness per sister, patient already residing at [address], no evidence of flail chest or pneumothorax on x-ray  Plan:   PT/OT reevaluation  Fall precautions  Up with assistance only  Reconsult  to get patient back to SNF with possible need for higher level of care  Incentive spirometer every 2 hours while awake  May have Tylenol and/or Ultram for pain          Plan of care discussed with: patient    Patient seen and examined on 2/18 at 11:42 PM    SIGNATURE: CHRISTOPHER Howard CNP  DATE: February 19, 2020  TIME: 12:37 AM     Dr. Graham Ferro MD - supervising physician

## 2020-02-19 NOTE — ED NOTES
Pt taken off BiPap, not tolerating mask anymore. Provider to place pt on High flow oxygen on the floor. KARINA Verma at bedside for admission assessment.        Rosalie Jackson RN  [phone]/99/[phone]

## 2020-02-19 NOTE — CARE COORDINATION
READMIT:  WAS IN REHAB 8-10 DAYS  From: Dwight Sharon; DC Plan: Return to [facility] - will need PT/OT(ORDERED); needs a precert  to return. SENT TO ER FOR ALT MS. GONZALEZ, RIB FX      PMH: COPD, CHF, L3 FX, TACHY/AFIB INTERMITTENTLY, LUNG NODULES.     Ct HEAD NEG, CT CHEST AND SPINE: CLOSED RT RIB FXS, L3 FX, T11 KYPHO  CXR GROUND GLASS INFILT     IVF - TO TO BE D/C'D NURSE REMINDED  O2 HIGH FLOW 40   LASIX 40 IV/DAY  SOLUMEDRL  VIBRAMYCIN  PT/OT  PALL CARE CX     B/CREAT 13/0.45  ART PH 7.333  PCO2 82.4---77  TROP <0.010 ------0.016 DOC AWARE     PT/OT AND PALL CARE, WEAN O2 BACK TO HOME O2,

## 2020-02-19 NOTE — CARE COORDINATION
[facility] EMERGENCY [facility] CENTER AT [place] Case Management Initial Discharge Assessment    The LSW met with the patient's son, James Summers, to discuss discharge plan. The patient was sleeping during the assessment for case management. PCP: Gwendolyn Beck, DO                                Date of Last Visit:     If no PCP, list provided? N/A    Discharge Planning    Living Arrangements: The patient is from [facility] - short term rehab. Patient's son had been staying with the patient for 3 months prior to the patient going to [facility]. Who do you live with? From [facility]    Who helps you with your care:  From [facility] - staff assisting the patient    If lives at home:     Do you have any barriers navigating in your home? N/A    Patient can perform ADL? Staff at [facility] [address] the patient    Current Services (outpatient and in home) :  From [facility]    Dialysis: No    Is transportation available to get to your appointments? Yes    DME Equipment:  Yes - at [facility]    Respiratory equipment: Provided by [facility]; Per patient's son - had O2 at home for night    Respiratory provider:  Not sure for home per son     Pharmacy:  [facility]; [facility] [address] with Medication Assistance Program?  No      Patient agreeable to [place] [address]? N/A    Patient agreeable to SNF/Rehab? Return to [facility]    Other discharge needs identified? Freedom of choice list provided with basic dialogue that supports the patient's individualized plan of care/goals and shares the quality data associated with the providers. N/A     Does Patient Have a High-Risk for Readmission Diagnosis (CHF, PN, MI, COPD)? See  note    The plan for Transition of Care is related to the following treatment goals: Await PT/OT. The patient will need a precert to return to [facility]. Initial Discharge Plan: Await PT/OT. The patient will need a precert to return to [facility].

## 2020-02-19 NOTE — PROGRESS NOTES
Nutrition Assessment    Type and Reason for Visit: Initial, Positive Nutrition Screen    Nutrition Recommendations: Modify current diet, Start ONS(SONYA diet/provide high calorie supplement tid)    Nutrition Assessment: Pt at nutritional risk due to decreased po intake and weight loss. To provide high calorie supplement tid. Malnutrition Assessment:  · Malnutrition Status: Insufficient data  · Context: Chronic illness  · Findings of the 6 clinical characteristics of malnutrition (Minimum of 2 out of 6 clinical characteristics is required to make the diagnosis of moderate or severe Protein Calorie Malnutrition based on AND/ASPEN Guidelines):  1. Energy Intake-Less than or equal to 75% of estimated energy requirement(stated per son), Unable to assess    2. Weight Loss-Unable to assess,    3. Fat Loss-No significant subcutaneous fat loss,    4. Muscle Loss-Mild muscle mass loss, Temples (temporalis muscle)  5. Fluid Accumulation-Mild fluid accumulation, Extremities  6.  Strength-Not measured    Nutrition Risk Level: High    Nutrient Needs:  · Estimated Daily Total Kcal: 7978-4849 (kg x 16-18)  · Estimated Daily Protein (g): 68-74 (kg x 1.2-1.3)  · Estimated Daily Total Fluid (ml/day): ~1500 (1ml/kcal)    Nutrition Diagnosis:   · Problem: Inadequate oral intake  · Etiology: related to Catabolic illness     Signs and symptoms:  as evidenced by Diet history of poor intake, Weight loss    Objective Information:  · Nutrition-Focused Physical Findings: Per son, pt's intake <50% at [facility]. Pt has taken Ensure previously, flavor preference noted. Trace Gen and +1 BLE edema noted. IVF @ 100ml/hr.   · Wound Type: None  · Current Nutrition Therapies:  · Oral Diet Orders: Cardiac   · Oral Diet intake: 26-50%  · Oral Nutrition Supplement (ONS) Orders: None  · Anthropometric Measures:  · Ht: 5' 5\" (165.1 cm)   · Current Body Wt: 190 lb (86.2 kg)(1-9-20 [facility])  · Admission Body Wt: 200 lb (90.7 kg)(stated)  · Usual Body Wt: 206 lb

## 2020-02-19 NOTE — ED NOTES
Awaiting results of scans. Family at bedside. Pt has multiple bruises to bilateral arms and legs from recent falls at home. Pt was admitted to [facility] ER for past 10 days for PT to build strength. Family states she normally wears 2lNC at night but for past month has needed continuous oxygen.      Yamini Pineda RN  26/67/58 [id number]       Yamini Pineda RN  05/99/69 0426

## 2020-02-19 NOTE — PROGRESS NOTES
Needs assistance  Homemaking Assistance: Needs assistance(Facility manages)  Ambulation Assistance: Needs assistance(non-ambulatory)  Transfer Assistance: Needs assistance(2 person assist)  Active : No    OBJECTIVE:     Orientation Status:  Orientation  Overall Orientation Status: Within Functional Limits    Observation:  Observation/Palpation  Posture: Fair  Observation: Pt. alert and attentive, on hi-flow O2, respiratory managing. Intermittent posterior LOB when sitting EOB    Cognition Status:  Cognition  Overall Cognitive Status: WFL    Perception Status:  Perception  Overall Perceptual Status: WFL    Sensation Status:  Sensation  Overall Sensation Status: WFL    Vision and Hearing Status:  Vision  Vision: Within Functional Limits  Hearing  Hearing: Within functional limits     ROM:   LUE AROM (degrees)  LUE AROM : WFL  LUE General AROM: Arthritic changes  Left Hand AROM (degrees)  Left Hand AROM: WFL  RUE AROM (degrees)  RUE AROM : WFL  RUE General AROM: Arthritic changes  Right Hand AROM (degrees)  Right Hand AROM: WFL    Strength:  LUE Strength  Gross LUE Strength: Exceptions to [facility]  L Hand General: 3/5  LUE Strength Comment: 3/5 all planes  RUE Strength  Gross RUE Strength: Exceptions to [facility]  R Hand General: 3/5  RUE Strength Comment: 3/5 all planes    Coordination, Tone, Quality of Movement:    Tone RUE  RUE Tone: Normotonic  Tone LUE  LUE Tone: Normotonic  Coordination  Movements Are Fluid And Coordinated: No  Coordination and Movement description: Fine motor impairments, Decreased speed, Decreased accuracy, Right UE, Left UE  Quality of Movement Other  Comment: Pt. reports deficits are baseline    Hand Dominance:  Hand Dominance  Hand Dominance: Right    ADL Status:  ADL  Feeding: Setup  Grooming: Setup  UE Bathing: Maximum assistance  LE Bathing: Maximum assistance  UE Dressing: Maximum assistance  LE Dressing: Maximum assistance  Toileting: Dependent/Total  Additional Comments: Simulated ADLs as

## 2020-02-19 NOTE — FLOWSHEET NOTE
Pt. A&Ox4. Pt. Was in Acute Resp. Failure with hypercarbia, and disorientation. Pt. Was oriented when arriving to floor. Pt. Was on Bi-Pap and then placed on high flow oxygen at 40%. Pt. Stated that she was tired and just wanted to sleep. Pt. Rested the rest of the night and vitals were stable.

## 2020-02-20 ENCOUNTER — APPOINTMENT (OUTPATIENT)
Dept: GENERAL RADIOLOGY | Age: 85
DRG: 189 | End: 2020-02-20
Payer: MEDICARE

## 2020-02-20 LAB
ANION GAP SERPL CALCULATED.3IONS-SCNC: 9 MEQ/L (ref 9–15)
BUN BLDV-MCNC: 19 MG/DL (ref 8–23)
CALCIUM SERPL-MCNC: 9 MG/DL (ref 8.5–9.9)
CHLORIDE BLD-SCNC: 97 MEQ/L (ref 95–107)
CO2: 38 MEQ/L (ref 20–31)
CREAT SERPL-MCNC: 0.55 MG/DL (ref 0.5–0.9)
GFR AFRICAN AMERICAN: >60
GFR NON-AFRICAN AMERICAN: >60
GLUCOSE BLD-MCNC: 164 MG/DL (ref 70–99)
HCT VFR BLD CALC: 30.8 % (ref 37–47)
HEMOGLOBIN: 9.9 G/DL (ref 12–16)
MCH RBC QN AUTO: 32.4 PG (ref 27–31.3)
MCHC RBC AUTO-ENTMCNC: 32.2 % (ref 33–37)
MCV RBC AUTO: 100.6 FL (ref 82–100)
ORGANISM: ABNORMAL
PDW BLD-RTO: 14.1 % (ref 11.5–14.5)
PLATELET # BLD: 166 K/UL (ref 130–400)
POTASSIUM REFLEX MAGNESIUM: 4 MEQ/L (ref 3.4–4.9)
RBC # BLD: 3.07 M/UL (ref 4.2–5.4)
SODIUM BLD-SCNC: 144 MEQ/L (ref 135–144)
URINE CULTURE, ROUTINE: ABNORMAL
WBC # BLD: 6.3 K/UL (ref 4.8–10.8)

## 2020-02-20 PROCEDURE — 97112 NEUROMUSCULAR REEDUCATION: CPT

## 2020-02-20 PROCEDURE — 6360000002 HC RX W HCPCS: Performed by: INTERNAL MEDICINE

## 2020-02-20 PROCEDURE — 80048 BASIC METABOLIC PNL TOTAL CA: CPT

## 2020-02-20 PROCEDURE — 94640 AIRWAY INHALATION TREATMENT: CPT

## 2020-02-20 PROCEDURE — 6370000000 HC RX 637 (ALT 250 FOR IP): Performed by: HOSPITALIST

## 2020-02-20 PROCEDURE — 85027 COMPLETE CBC AUTOMATED: CPT

## 2020-02-20 PROCEDURE — 99231 SBSQ HOSP IP/OBS SF/LOW 25: CPT | Performed by: NURSE PRACTITIONER

## 2020-02-20 PROCEDURE — 36415 COLL VENOUS BLD VENIPUNCTURE: CPT

## 2020-02-20 PROCEDURE — 6370000000 HC RX 637 (ALT 250 FOR IP): Performed by: INTERNAL MEDICINE

## 2020-02-20 PROCEDURE — 2700000000 HC OXYGEN THERAPY PER DAY

## 2020-02-20 PROCEDURE — 71045 X-RAY EXAM CHEST 1 VIEW: CPT

## 2020-02-20 PROCEDURE — 2580000003 HC RX 258: Performed by: HOSPITALIST

## 2020-02-20 PROCEDURE — 94761 N-INVAS EAR/PLS OXIMETRY MLT: CPT

## 2020-02-20 PROCEDURE — 1210000000 HC MED SURG R&B

## 2020-02-20 PROCEDURE — 6360000002 HC RX W HCPCS: Performed by: HOSPITALIST

## 2020-02-20 PROCEDURE — 97535 SELF CARE MNGMENT TRAINING: CPT

## 2020-02-20 RX ORDER — PREDNISONE 20 MG/1
40 TABLET ORAL DAILY
Qty: 20 TABLET | Refills: 0 | Status: SHIPPED | OUTPATIENT
Start: 2020-02-20 | End: 2020-02-25

## 2020-02-20 RX ORDER — DOXYCYCLINE HYCLATE 100 MG/1
100 CAPSULE ORAL EVERY 12 HOURS SCHEDULED
Qty: 10 CAPSULE | Refills: 0 | Status: SHIPPED | OUTPATIENT
Start: 2020-02-20 | End: 2020-02-25

## 2020-02-20 RX ORDER — BUDESONIDE AND FORMOTEROL FUMARATE DIHYDRATE 160; 4.5 UG/1; UG/1
2 AEROSOL RESPIRATORY (INHALATION) 2 TIMES DAILY
Qty: 1 INHALER | Refills: 3 | Status: SHIPPED | OUTPATIENT
Start: 2020-02-20

## 2020-02-20 RX ADMIN — PREDNISOLONE ACETATE 1 DROP: 10 SUSPENSION/ DROPS OPHTHALMIC at 10:10

## 2020-02-20 RX ADMIN — METHYLPREDNISOLONE SODIUM SUCCINATE 40 MG: 40 INJECTION, POWDER, FOR SOLUTION INTRAMUSCULAR; INTRAVENOUS at 00:29

## 2020-02-20 RX ADMIN — Medication 10 ML: at 17:49

## 2020-02-20 RX ADMIN — ENOXAPARIN SODIUM 40 MG: 40 INJECTION SUBCUTANEOUS at 10:10

## 2020-02-20 RX ADMIN — ATORVASTATIN CALCIUM 20 MG: 20 TABLET, FILM COATED ORAL at 20:39

## 2020-02-20 RX ADMIN — METHYLPREDNISOLONE SODIUM SUCCINATE 40 MG: 40 INJECTION, POWDER, FOR SOLUTION INTRAMUSCULAR; INTRAVENOUS at 10:10

## 2020-02-20 RX ADMIN — FAMOTIDINE 20 MG: 20 TABLET ORAL at 20:39

## 2020-02-20 RX ADMIN — DOXYCYCLINE HYCLATE 100 MG: 50 CAPSULE, GELATIN COATED ORAL at 21:16

## 2020-02-20 RX ADMIN — METHYLPREDNISOLONE SODIUM SUCCINATE 40 MG: 40 INJECTION, POWDER, FOR SOLUTION INTRAMUSCULAR; INTRAVENOUS at 17:49

## 2020-02-20 RX ADMIN — ACYCLOVIR 400 MG: 400 TABLET ORAL at 10:21

## 2020-02-20 RX ADMIN — IPRATROPIUM BROMIDE AND ALBUTEROL SULFATE 1 AMPULE: .5; 3 SOLUTION RESPIRATORY (INHALATION) at 13:12

## 2020-02-20 RX ADMIN — METOPROLOL TARTRATE 50 MG: 50 TABLET, FILM COATED ORAL at 17:49

## 2020-02-20 RX ADMIN — ASPIRIN 81 MG: 81 TABLET, COATED ORAL at 10:10

## 2020-02-20 RX ADMIN — Medication 10 ML: at 10:10

## 2020-02-20 RX ADMIN — METOPROLOL TARTRATE 50 MG: 50 TABLET, FILM COATED ORAL at 05:34

## 2020-02-20 RX ADMIN — Medication 10 ML: at 20:39

## 2020-02-20 RX ADMIN — FAMOTIDINE 20 MG: 20 TABLET ORAL at 10:10

## 2020-02-20 RX ADMIN — ACYCLOVIR 400 MG: 400 TABLET ORAL at 20:39

## 2020-02-20 RX ADMIN — DOXYCYCLINE HYCLATE 100 MG: 50 CAPSULE, GELATIN COATED ORAL at 10:21

## 2020-02-20 RX ADMIN — IPRATROPIUM BROMIDE AND ALBUTEROL SULFATE 1 AMPULE: .5; 3 SOLUTION RESPIRATORY (INHALATION) at 21:06

## 2020-02-20 ASSESSMENT — ENCOUNTER SYMPTOMS
EYE DISCHARGE: 0
TROUBLE SWALLOWING: 0
CONSTIPATION: 0
SORE THROAT: 0
BACK PAIN: 0
VOMITING: 0
ANAL BLEEDING: 0
NAUSEA: 0
CHOKING: 0
APNEA: 0
CHEST TIGHTNESS: 0
DIARRHEA: 0
BLOOD IN STOOL: 0
ABDOMINAL DISTENTION: 0
COLOR CHANGE: 0
COUGH: 0
STRIDOR: 0
ABDOMINAL PAIN: 0
VOICE CHANGE: 0
WHEEZING: 0
RECTAL PAIN: 0
SHORTNESS OF BREATH: 1

## 2020-02-20 ASSESSMENT — PAIN SCALES - GENERAL: PAINLEVEL_OUTOF10: 0

## 2020-02-20 NOTE — PROGRESS NOTES
level of independence. Transfers  Sit to Stand: Moderate Assistance  Stand to sit: Minimal Assistance  Comment: vc's for hand placement and correcting R lean and retropulsion. Pt with inconsistent ability to maintain midline. Neuromuscular Education  Neuromuscular Comments: standing and sitting EOB static/dynamic balance activities, pt with R lean and retropulsion. Activity Tolerance  Activity Tolerance: Patient Tolerated treatment well          ASSESSMENT   Assessment: pt able to progress to standing this date, difficulty when attempting to sidestep d/t poor weight shifting. Discharge Recommendations:  Patient would benefit from continued therapy after discharge    Goals  Long term goals  Long term goal 1: pt to roll side to side with SBA  Long term goal 2: pt to be min A with supine <> sit  Long term goal 3: pt to sit EOB > 10 min with SBA and upright posture  Long term goal 4: SBA with LE HEP  Long term goal 5: pt to be mod A +2 with pivot transfers  Patient Goals   Patient goals : agreeable to PT POC    PLAN    Safety Devices  Type of devices: Bed alarm in place;Call light within reach     [facility] (6 CLICK) BASIC MOBILITY  AM-PAC Inpatient Mobility Raw Score : 10      Therapy Time   Individual   Time In 1427   Time Out 1450   Minutes 23      BM/Trsf: 15  NM: PATRICIA Hanson, 02/20/20 at 2:56 PM         Definitions for assistance levels  Independent = pt does not require any physical supervision or assistance from another person for activity completion. Device may be needed.   Stand by assistance = pt requires verbal cues or instructions from another person, close to but not touching, to perform the activity  Minimal assistance= pt performs 75% or more of the activity; assistance is required to complete the activity  Moderate assistance= pt performs 50% of the activity; assistance is required to complete the activity  Maximal assistance = pt performs 25% of the activity; assistance is required to complete the activity  Dependent = pt requires total physical assistance to accomplish the task

## 2020-02-20 NOTE — CONSULTS
1010    famotidine (PEPCID) tablet 20 mg  20 mg Oral BID Dorothe Hence, APRN - CNP   20 mg at 02/20/20 1010    acetaminophen (TYLENOL) tablet 650 mg  650 mg Oral Q4H PRN Dorothe Hence, APRN - CNP        traMADol (ULTRAM) tablet 50 mg  50 mg Oral Q6H PRN Dorothe Hence, APRN - CNP        aspirin EC tablet 81 mg  81 mg Oral Daily Dorothe Hence, APRN - CNP   81 mg at 02/20/20 1010    atorvastatin (LIPITOR) tablet 20 mg  20 mg Oral Nightly Dorothe Hence, APRN - CNP   20 mg at 02/19/20 2037    metoprolol tartrate (LOPRESSOR) tablet 50 mg  50 mg Oral BID Dorothe Hence, APRN - CNP   50 mg at 02/20/20 0534    prednisoLONE acetate (PRED FORTE) 1 % ophthalmic suspension 1 drop  1 drop Right Eye Daily Dorothe Hence, APRN - CNP   1 drop at 02/20/20 1010    acyclovir (ZOVIRAX) tablet 400 mg  400 mg Oral BID Dorothe Hence, APRN - CNP   400 mg at 02/20/20 1021    ipratropium-albuterol (DUONEB) nebulizer solution 1 ampule  1 ampule Inhalation Q4H WA Ade Andre MD   1 ampule at 02/19/20 1930    methylPREDNISolone sodium (SOLU-MEDROL) injection 40 mg  40 mg Intravenous Q8H Ade Lr MD   40 mg at 02/20/20 1010    doxycycline (VIBRAMYCIN) capsule 100 mg  100 mg Oral 2 times per day Mariam Hoover MD   100 mg at 02/20/20 1021    budesonide-formoterol (SYMBICORT) 160-4.5 MCG/ACT inhaler 2 puff  2 puff Inhalation BID Ade Andre MD   2 puff at 02/19/20 1000       History:       PMHx:  Past Medical History:   Diagnosis Date    Chronic diastolic CHF (congestive heart failure) ([facility] [address]     per echo 2/5/20    Compression fracture of L3 vertebra (HCC)     COPD (chronic obstructive pulmonary disease) (HCC)     Emphysema lung (HCC)     diffuse    HLD (hyperlipidemia)     Lung nodules     OA (osteoarthritis)     On home O2     Osteoporosis     Spinal stenosis of lumbar region with neurogenic claudication     Spondylosis     Tachycardia     Uveitis        PSHx:  Past Surgical History:   Procedure Laterality Date    KYPHOSIS SURGERY      T12       Social Hx:  Social History     Socioeconomic History    Marital status:       Spouse name: None    Number of children: None    Years of education: None    Highest education level: None   Occupational History    None   Social Needs    Financial resource strain: None    Food insecurity:     Worry: None     Inability: None    Transportation needs:     Medical: None     Non-medical: None   Tobacco Use    Smoking status: Former Smoker    Smokeless tobacco: Never Used   Substance and Sexual Activity    Alcohol use: No    Drug use: No    Sexual activity: None   Lifestyle    Physical activity:     Days per week: None     Minutes per session: None    Stress: None   Relationships    Social connections:     Talks on phone: None     Gets together: None     Attends Yazdanism service: None     Active member of club or organization: None     Attends meetings of clubs or organizations: None     Relationship status: None    Intimate partner violence:     Fear of current or ex partner: None     Emotionally abused: None     Physically abused: None     Forced sexual activity: None   Other Topics Concern    None   Social History Narrative    None       Family Hx:  Family History   Family history unknown: Yes       LABS: Reviewed     CBC:  Lab Results   Component Value Date    WBC 6.3 02/20/2020    RBC 3.07 02/20/2020    HGB 9.9 02/20/2020    HCT 30.8 02/20/2020    .6 02/20/2020    MCH 32.4 02/20/2020    MCHC 32.2 02/20/2020    RDW 14.1 02/20/2020     02/20/2020     CBC with Differential:   Lab Results   Component Value Date    WBC 6.3 02/20/2020    RBC 3.07 02/20/2020    HGB 9.9 02/20/2020    HCT 30.8 02/20/2020     02/20/2020    .6 02/20/2020    MCH 32.4 02/20/2020    MCHC 32.2 02/20/2020    RDW 14.1 02/20/2020    LYMPHOPCT 4.8 02/18/2020    MONOPCT 9.4 02/18/2020    BASOPCT 0.2 02/18/2020    MONOSABS 0.8 Parenchyma: Bilateral symmetric periventricular areas decreased attenuation. Focal 4 mm area decreased attenuation left basal ganglia exerting no mass effect, unchanged. Midline Shift:  None. Cerebellum:  Normal. Paranasal sinuses and mastoid air cells:  Normal. Visualized Orbits:  Normal.     Impression: No acute findings. Age-related changes as discussed. Remote left basal ganglia infarct. All CT scans at this facility use dose modulation, iterative reconstruction, and/or weight based dosing when appropriate to reduce radiation dose to as low as reasonably achievable. Ct Chest W Contrast    Result Date: 2/19/2020  CT of the Chest with intravenous contrast medium History: Altered mental status. Possible fall. Technical Factors: CT imaging of the chest was obtained and formatted as 5 mm contiguous axial images from the thoracic inlet through the adrenal glands. Sagittal and coronal reconstruction obtained during postprocessing. Intravenous contrast medium:  Isovue-370, 100 mL Comparison:  CTA chest of February 4, 2020, chest radiograph, same date. Findings: Right lung shows diffuse emphysematous change. Scarring anterior right upper lobe. Dependent atelectatic change, right upper lobe resolved. Focal area of \"tree-in-bud\" nodularity, an area of prior focal subsegmental atelectatic change. Previously visualized noncalcified pleural-based 1.6 cm x 1.1 cm nodular area, now measures 10 x 6 mm. Left lung shows diffuse emphysematous change. Area of subsegmental dependent atelectatic change, left upper lobe again identified, but markedly decreased since prior study. Lingular scarring versus subsegmental atelectatic change stable. Focal area of pleural-based consolidation, previously visualized left lower lobe, again identified, but its much smaller than prior study. Posterior, aberrant, right brachiocephalic vein, normal variant. Thoracic aorta normal in course and caliber with diffuse calcification.  Cardiac size upper limits of normal. No pericardial effusion. The pretracheal lymph node has decreased in size, now measuring 6 mm. Bilateral hilar lymph node enlargement no longer evident. 6 mm area decreased attenuation left lobe thyroid. No osteoblastic, no osteolytic lesions. Diffuse disc space narrowing thoracic spine. Remote kyphoplasty T11. Emphysema. Bilateral multiple foci of atelectatic changes discussed, either resolved, or smaller than prior study. Interval resolution of hilar, and mediastinal lymph node enlargement. Left thyroid nodule as discussed. If clinical concern warrants, thyroid sonography may be utilized for further evaluation to rule out malignancy. Remote T11 kyphoplasty. Degenerative change, thoracic spine. Other findings discussed. All CT scans at this facility use dose modulation, iterative reconstruction, and/or weight based dosing when appropriate to reduce radiation dose to as low as reasonably achievable. Ct Cervical Spine Wo Contrast    Result Date: 2/19/2020  CT CERVICAL SPINE WO CONTRAST CLINICAL HISTORY: Generalized pain after fall COMPARISON: NONE Findings: Multiple serial axial images of the cervical spine from the base of the skull through the upper thoracic vertebra with both sagittal and coronal reconstructions was performed. There is diffuse generalized osteopenia There is straightening of the normal expected cervical lordosis. There is multilevel degenerative joint disease. Prevertebral  soft tissues are  unremarkable. The disk spaces are narrowed at the C3-4, 4-5, C5-6, C6-7,..  No acute fractures or spondylo-listhesis. There are nodules seen in both left and right lobes of thyroid. The largest on the left measures 1.3 cm. There is an old fracture dislocation of the neck of the right clavicle. There is mild to moderate emphysematous changes in the visualized upper lung parenchyma as well as biapical areas of scarring fibrosis right greater than left . NO ACUTE FRACTURES. since prior examination. 1 INTERVAL PROGRESSION OF OLD COMPRESSION FRACTURE OF L3 AS COMPARED TO PRIOR STUDY. 2 NEW COMPRESSION FRACTURE OF THE L4 SUPERIOR ENDPLATES COMPARED TO PRIOR PLAIN FILM STUDY OF JULY 31, 2019. THERE IS INCREASED ATTENUATION SEEN WITHIN THE GALLBLADDER. COULD REPRESENT GALLBLADDER SLUDGE OR STONES. CORRELATE CLINICALLY. CONSIDER FOLLOW-UP ULTRASOUND TO FURTHER EVALUATE IF CLINICALLY INDICATED All CT scans at this facility use dose modulation, iterative reconstruction, and/or weight based dosing when appropriate to reduce radiation dose to as low as reasonably achievable. Xr Chest Portable    Result Date: 2/19/2020  EXAMINATION: CHEST PORTABLE VIEW  CLINICAL HISTORY: Short of breath COMPARISONS: February 4, 2020. FINDINGS: Single  views of the chest is submitted. The cardiac silhouette is enlarged. Pulmonary vascular attenuated. Lung fields are hyperinflated. Right sided trachea. Atelectasis, patchy groundglass infiltrate in the bases. Right greater than left. .  No Pneumothoraces. ATELECTASIS, PATCHY GROUNDGLASS INFILTRATE IN THE BASES. RIGHT GREATER THAN LEFT SUPERIMPOSED UPON COPD. Sara Recio PLEASE SEE CT SCAN CHEST REPORT TO FOLLOW FOR ADDITIONAL DETAILS           Assessment and plan:    COPD exacerbation  Follow with primary medical team    Risk for Delirium  Monitor for signs and symptoms of delirium as due to advanced age, history of cognitive difficulty, acute illness, and multiple co morbidities patient is high risk. Offer continuous behavior redirection and orientation, avoid delirium inducing medication, expose to natural sunlight as much as possible, calm surroundings as much as possible. If non pharmacological  Interventions are not successful, consider Haldol 0.5 mg po tid    -Advance Care Planning  Discussed goals of care with patient.  Explained in extensive detail nuances between full code, DNR

## 2020-02-20 NOTE — DISCHARGE SUMMARY
normal.  No rashes or lesions. Neuro: Non Focal. Symetrical motor and tone. Nl Comprehension, Alert,awake and oriented. NL CN. Symetrical tone and reflexes. Psychiatric: Alert and oriented, thought content appropriate, normal insight  Capillary Refill: Brisk,< 3 seconds   Peripheral Pulses: +2 palpable, equal bilaterally     Discharge Medications:       Ilana Graft   Home Medication Instructions AZZ:[id number]    Printed on:02/20/20 1321   Medication Information                      albuterol (PROVENTIL) (2.5 MG/3ML) 0.083% nebulizer solution  Take 2.5 mg by nebulization every 4 hours as needed for Wheezing              aspirin 81 MG EC tablet  Take 1 tablet by mouth daily             atorvastatin (LIPITOR) 20 MG tablet  Take 1 tablet by mouth nightly             budesonide-formoterol (SYMBICORT) 160-4.5 MCG/ACT AERO  Inhale 2 puffs into the lungs 2 times daily             doxycycline (VIBRAMYCIN) 100 MG capsule  Take 1 capsule by mouth every 12 hours for 5 days             metoprolol tartrate (LOPRESSOR) 50 MG tablet  Take 1 tablet by mouth 2 times daily             prednisoLONE acetate (PRED FORTE) 1 % ophthalmic suspension  Place 1 drop into the right eye daily             predniSONE (DELTASONE) 20 MG tablet  Take 2 tablets by mouth daily for 5 days             valACYclovir (VALTREX) 500 MG tablet  Take 500 mg by mouth daily               Significant findings : None  Disposition:   Discharged to Home. Any [facility] needs that were indicated and/or required as been addressed and set up by Social Work. Condition at discharge: Pt was medically stable at the time of discharge. Significant improvement in clinical condition compared to initial condition at presentation to hospital    Activity: activity as tolerated, fall precautions. Total time taken for discharging this patient: 40 minutes. Greater than 70% of time was spent focused exclusively on this patient.  Time was taken to review chart, discuss plans with consultants, reconciling medications, discussing plan answering questions with patient. Signed:  Raúl Lr  2/20/2020, 11:57 AM  ----------------------------------------------------------------------------------------------------------------------    Vianey Hernandez,     Please return to ER or call 911 if you develop any significant signs or symptoms. I may not have addressed all of your medical illnesses or the abnormal blood work or imaging therefore please ask your PCP, Francine Morales DO ,  to obtain [phone] [facility] record to follow up on all of the abnormal labs, imaging and findings that I have and have not addressed during your hospitalization. Discharging you from the hospital does not mean that your medical care ends here and now. You may still need additional work up, investigation, monitoring, and treatment to be handled from this point on by outside providers including your PCP, Francine Morales DO , Specialists and other healthcare providers. Please review your list of discharge medications prior to resuming medications you might still have at home, as the medications you need to be taking, dosages or how often you must take them may have changed. For medication questions, contact your retail pharmacy and your PCP, Francine Morales DO .     ** I STRONGLY RECOMMEND that you follow up with Francine Morales DO within 3 to 5 days for a post hospitalization evaluation. This specific office visit is covered by your insurance, and is not the same as your annual doctor visit/ check up. This office visit is important, as it may prevent need for repeat and/or future hospitalizations. **    Your medical team at [facility] ([facility] appreciates the opportunity to work with you to get well!     Sincerely,  .S. Kandis Lr

## 2020-02-21 VITALS
HEIGHT: 65 IN | TEMPERATURE: 97.5 F | HEART RATE: 65 BPM | BODY MASS INDEX: 31.22 KG/M2 | RESPIRATION RATE: 16 BRPM | DIASTOLIC BLOOD PRESSURE: 70 MMHG | WEIGHT: 187.39 LBS | OXYGEN SATURATION: 99 % | SYSTOLIC BLOOD PRESSURE: 115 MMHG

## 2020-02-21 LAB
ANION GAP SERPL CALCULATED.3IONS-SCNC: 8 MEQ/L (ref 9–15)
BUN BLDV-MCNC: 19 MG/DL (ref 8–23)
CALCIUM SERPL-MCNC: 8.8 MG/DL (ref 8.5–9.9)
CHLORIDE BLD-SCNC: 98 MEQ/L (ref 95–107)
CO2: 39 MEQ/L (ref 20–31)
CREAT SERPL-MCNC: 0.47 MG/DL (ref 0.5–0.9)
GFR AFRICAN AMERICAN: >60
GFR NON-AFRICAN AMERICAN: >60
GLUCOSE BLD-MCNC: 158 MG/DL (ref 70–99)
POTASSIUM REFLEX MAGNESIUM: 4.5 MEQ/L (ref 3.4–4.9)
SODIUM BLD-SCNC: 145 MEQ/L (ref 135–144)

## 2020-02-21 PROCEDURE — 6360000002 HC RX W HCPCS: Performed by: HOSPITALIST

## 2020-02-21 PROCEDURE — 6370000000 HC RX 637 (ALT 250 FOR IP): Performed by: INTERNAL MEDICINE

## 2020-02-21 PROCEDURE — 97110 THERAPEUTIC EXERCISES: CPT

## 2020-02-21 PROCEDURE — 94640 AIRWAY INHALATION TREATMENT: CPT

## 2020-02-21 PROCEDURE — 6370000000 HC RX 637 (ALT 250 FOR IP): Performed by: HOSPITALIST

## 2020-02-21 PROCEDURE — 36415 COLL VENOUS BLD VENIPUNCTURE: CPT

## 2020-02-21 PROCEDURE — 97535 SELF CARE MNGMENT TRAINING: CPT

## 2020-02-21 PROCEDURE — 80048 BASIC METABOLIC PNL TOTAL CA: CPT

## 2020-02-21 PROCEDURE — 94761 N-INVAS EAR/PLS OXIMETRY MLT: CPT

## 2020-02-21 PROCEDURE — 2580000003 HC RX 258: Performed by: HOSPITALIST

## 2020-02-21 PROCEDURE — 2700000000 HC OXYGEN THERAPY PER DAY

## 2020-02-21 PROCEDURE — 6360000002 HC RX W HCPCS: Performed by: INTERNAL MEDICINE

## 2020-02-21 RX ADMIN — ACYCLOVIR 400 MG: 400 TABLET ORAL at 09:33

## 2020-02-21 RX ADMIN — Medication 10 ML: at 09:32

## 2020-02-21 RX ADMIN — IPRATROPIUM BROMIDE AND ALBUTEROL SULFATE 1 AMPULE: .5; 3 SOLUTION RESPIRATORY (INHALATION) at 07:30

## 2020-02-21 RX ADMIN — METOPROLOL TARTRATE 50 MG: 50 TABLET, FILM COATED ORAL at 06:57

## 2020-02-21 RX ADMIN — DOXYCYCLINE HYCLATE 100 MG: 50 CAPSULE, GELATIN COATED ORAL at 09:31

## 2020-02-21 RX ADMIN — METHYLPREDNISOLONE SODIUM SUCCINATE 40 MG: 40 INJECTION, POWDER, FOR SOLUTION INTRAMUSCULAR; INTRAVENOUS at 09:32

## 2020-02-21 RX ADMIN — ASPIRIN 81 MG: 81 TABLET, COATED ORAL at 09:33

## 2020-02-21 RX ADMIN — IPRATROPIUM BROMIDE AND ALBUTEROL SULFATE 1 AMPULE: .5; 3 SOLUTION RESPIRATORY (INHALATION) at 11:07

## 2020-02-21 RX ADMIN — PREDNISOLONE ACETATE 1 DROP: 10 SUSPENSION/ DROPS OPHTHALMIC at 09:33

## 2020-02-21 RX ADMIN — METHYLPREDNISOLONE SODIUM SUCCINATE 40 MG: 40 INJECTION, POWDER, FOR SOLUTION INTRAMUSCULAR; INTRAVENOUS at 01:30

## 2020-02-21 RX ADMIN — ENOXAPARIN SODIUM 40 MG: 40 INJECTION SUBCUTANEOUS at 09:31

## 2020-02-21 RX ADMIN — FAMOTIDINE 20 MG: 20 TABLET ORAL at 09:33

## 2020-02-21 ASSESSMENT — PAIN SCALES - GENERAL
PAINLEVEL_OUTOF10: 0
PAINLEVEL_OUTOF10: 0

## 2020-02-21 NOTE — PROGRESS NOTES
Occupational Therapy  Facility/Department: [facility] TELE  Daily Treatment Note  NAME: Leandra Xie  : 1934  MRN: [id number]    Date of Service: 2020    Discharge Recommendations:  Continue to assess pending progress       Assessment The patient was pleasant and cooperative. 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 Safety Devices  Safety Devices in place: Yes  Type of devices: All fall risk precautions in place         Patient Diagnosis(es): The primary encounter diagnosis was Disorientation. Diagnoses of Acute respiratory failure with hypoxia and hypercarbia (HCC) and Closed fracture of multiple ribs of right side, initial encounter were also pertinent to this visit. has a past medical history of Chronic diastolic CHF (congestive heart failure) ([facility]), Compression fracture of L3 vertebra ([facility]), COPD (chronic obstructive pulmonary disease) ([address] Emphysema lung ([facility]), HLD (hyperlipidemia), Lung nodules, OA (osteoarthritis), On home O2, Osteoporosis, Spinal stenosis of lumbar region with neurogenic claudication, Spondylosis, Tachycardia, and Uveitis. has a past surgical history that includes Kyphosis surgery. Restrictions  Restrictions/Precautions  Restrictions/Precautions: Fall Risk  Position Activity Restriction  Other position/activity restrictions: pt on hi-flow 02 at time of PT eval; monirotred sP02 throughout and remains >97% Patient on 2L 02 at this time. Subjective \"I'll try. \"  General  Chart Reviewed: Yes  Patient assessed for rehabilitation services?: Yes  Pain Assessment  Pain Assessment: 0-10  Pain Level: 0  Pre Treatment Pain Screening  Intervention List: Patient able to continue with treatment  Vital Signs  Patient Currently in Pain: No   Orientation WFL     Objective  Treatment focus on increasing patients endurance and UE strength to increase her independence with ADL's.  The patient completed 3 different exercises, 3 sets of 10 of each

## 2020-02-21 NOTE — PROGRESS NOTES
Physical Therapy  Facility/Department: [facility] [id number]  Physical Therapy Discharge      NAME: Lawerence Prader    : 1934 (80 y.o.)  MRN: [id number]    Account: [de-identified]  Gender: female      Patient has been discharged from acute care hospital. DC patient from current PT program.      Electronically signed by Elsy Sweet PT on 20 at 4:32 PM

## 2020-02-21 NOTE — PROGRESS NOTES
requests to lay back down. decreased tolerance compred to yesterday. Transfers  Sit to Stand: Unable to assess(pt not willing to attempt today. )                         Exercises  Quad Sets: x 10  Gluteal Sets: x 10   Ankle Pumps: x 10   Comments: instructed to complete throughout the day. Activity Tolerance  Activity Tolerance: Patient limited by endurance; Patient limited by fatigue          ASSESSMENT   Assessment: pt only able to tolerate sitting EOB for brief period then requests to lay back down. Discharge Recommendations:  Patient would benefit from continued therapy after discharge    Goals  Long term goals  Long term goal 1: pt to roll side to side with SBA  Long term goal 2: pt to be min A with supine <> sit  Long term goal 3: pt to sit EOB > 10 min with SBA and upright posture  Long term goal 4: SBA with LE HEP  Long term goal 5: pt to be mod A +2 with pivot transfers  Patient Goals   Patient goals : agreeable to PT POC    PLAN    Safety Devices  Type of devices: Bed alarm in place;Call light within reach     [facility] (6 CLICK) BASIC MOBILITY  AM-PAC Inpatient Mobility Raw Score : 10      Therapy Time   Individual   Time In 0943   Time Out 0955   Minutes 12      BM: PATRICIA Hanson, 02/21/20 at 10:04 AM         Definitions for assistance levels  Independent = pt does not require any physical supervision or assistance from another person for activity completion. Device may be needed.   Stand by assistance = pt requires verbal cues or instructions from another person, close to but not touching, to perform the activity  Minimal assistance= pt performs 75% or more of the activity; assistance is required to complete the activity  Moderate assistance= pt performs 50% of the activity; assistance is required to complete the activity  Maximal assistance = pt performs 25% of the activity; assistance is required to complete the activity  Dependent = pt requires total physical assistance to accomplish the task

## 2020-02-28 ENCOUNTER — TELEPHONE (OUTPATIENT)
Dept: OTHER | Facility: CLINIC | Age: 85
End: 2020-02-28

## 2020-02-28 ENCOUNTER — APPOINTMENT (OUTPATIENT)
Dept: GENERAL RADIOLOGY | Age: 85
DRG: 280 | End: 2020-02-28
Payer: MEDICARE

## 2020-02-28 ENCOUNTER — HOSPITAL ENCOUNTER (INPATIENT)
Age: 85
LOS: 4 days | Discharge: HOSPICE | DRG: 280 | End: 2020-03-03
Attending: INTERNAL MEDICINE | Admitting: INTERNAL MEDICINE
Payer: MEDICARE

## 2020-02-28 PROBLEM — I48.91 ATRIAL FIBRILLATION WITH RVR (HCC): Status: ACTIVE | Noted: 2020-02-28

## 2020-02-28 LAB
ALBUMIN SERPL-MCNC: 2.9 G/DL (ref 3.5–4.6)
ALP BLD-CCNC: 82 U/L (ref 40–130)
ALT SERPL-CCNC: 19 U/L (ref 0–33)
ANION GAP SERPL CALCULATED.3IONS-SCNC: 7 MEQ/L (ref 9–15)
AST SERPL-CCNC: 22 U/L (ref 0–35)
BASOPHILS ABSOLUTE: 0 K/UL (ref 0–0.2)
BASOPHILS RELATIVE PERCENT: 0.1 %
BILIRUB SERPL-MCNC: <0.2 MG/DL (ref 0.2–0.7)
BUN BLDV-MCNC: 17 MG/DL (ref 8–23)
CALCIUM SERPL-MCNC: 8.9 MG/DL (ref 8.5–9.9)
CHLORIDE BLD-SCNC: 91 MEQ/L (ref 95–107)
CO2: 40 MEQ/L (ref 20–31)
CREAT SERPL-MCNC: 0.49 MG/DL (ref 0.5–0.9)
EOSINOPHILS ABSOLUTE: 0 K/UL (ref 0–0.7)
EOSINOPHILS RELATIVE PERCENT: 0 %
GFR AFRICAN AMERICAN: >60
GFR NON-AFRICAN AMERICAN: >60
GLOBULIN: 2.5 G/DL (ref 2.3–3.5)
GLUCOSE BLD-MCNC: 151 MG/DL (ref 70–99)
HCT VFR BLD CALC: 30.7 % (ref 37–47)
HEMOGLOBIN: 9.9 G/DL (ref 12–16)
LYMPHOCYTES ABSOLUTE: 0.3 K/UL (ref 1–4.8)
LYMPHOCYTES RELATIVE PERCENT: 4.7 %
MAGNESIUM: 1.9 MG/DL (ref 1.7–2.4)
MCH RBC QN AUTO: 32.8 PG (ref 27–31.3)
MCHC RBC AUTO-ENTMCNC: 32.4 % (ref 33–37)
MCV RBC AUTO: 101.4 FL (ref 82–100)
MONOCYTES ABSOLUTE: 0.5 K/UL (ref 0.2–0.8)
MONOCYTES RELATIVE PERCENT: 8.9 %
NEUTROPHILS ABSOLUTE: 4.9 K/UL (ref 1.4–6.5)
NEUTROPHILS RELATIVE PERCENT: 86.3 %
PDW BLD-RTO: 15.8 % (ref 11.5–14.5)
PLATELET # BLD: 128 K/UL (ref 130–400)
POTASSIUM SERPL-SCNC: 3.9 MEQ/L (ref 3.4–4.9)
PRO-BNP: 3365 PG/ML
RBC # BLD: 3.02 M/UL (ref 4.2–5.4)
SODIUM BLD-SCNC: 138 MEQ/L (ref 135–144)
TOTAL PROTEIN: 5.4 G/DL (ref 6.3–8)
TROPONIN: 0.02 NG/ML (ref 0–0.01)
WBC # BLD: 5.6 K/UL (ref 4.8–10.8)

## 2020-02-28 PROCEDURE — 84484 ASSAY OF TROPONIN QUANT: CPT

## 2020-02-28 PROCEDURE — 2500000003 HC RX 250 WO HCPCS

## 2020-02-28 PROCEDURE — 85025 COMPLETE CBC W/AUTO DIFF WBC: CPT

## 2020-02-28 PROCEDURE — 2500000003 HC RX 250 WO HCPCS: Performed by: INTERNAL MEDICINE

## 2020-02-28 PROCEDURE — 83735 ASSAY OF MAGNESIUM: CPT

## 2020-02-28 PROCEDURE — 2500000003 HC RX 250 WO HCPCS: Performed by: EMERGENCY MEDICINE

## 2020-02-28 PROCEDURE — 71045 X-RAY EXAM CHEST 1 VIEW: CPT

## 2020-02-28 PROCEDURE — 2060000000 HC ICU INTERMEDIATE R&B

## 2020-02-28 PROCEDURE — 80053 COMPREHEN METABOLIC PANEL: CPT

## 2020-02-28 PROCEDURE — 96375 TX/PRO/DX INJ NEW DRUG ADDON: CPT

## 2020-02-28 PROCEDURE — 36415 COLL VENOUS BLD VENIPUNCTURE: CPT

## 2020-02-28 PROCEDURE — 96374 THER/PROPH/DIAG INJ IV PUSH: CPT

## 2020-02-28 PROCEDURE — 6370000000 HC RX 637 (ALT 250 FOR IP): Performed by: INTERNAL MEDICINE

## 2020-02-28 PROCEDURE — 2580000003 HC RX 258: Performed by: NURSE PRACTITIONER

## 2020-02-28 PROCEDURE — 6360000002 HC RX W HCPCS: Performed by: EMERGENCY MEDICINE

## 2020-02-28 PROCEDURE — 6360000002 HC RX W HCPCS: Performed by: PHYSICIAN ASSISTANT

## 2020-02-28 PROCEDURE — 2500000003 HC RX 250 WO HCPCS: Performed by: NURSE PRACTITIONER

## 2020-02-28 PROCEDURE — 99285 EMERGENCY DEPT VISIT HI MDM: CPT

## 2020-02-28 PROCEDURE — 83880 ASSAY OF NATRIURETIC PEPTIDE: CPT

## 2020-02-28 PROCEDURE — 2580000003 HC RX 258: Performed by: INTERNAL MEDICINE

## 2020-02-28 RX ORDER — FUROSEMIDE 20 MG/1
20 TABLET ORAL DAILY
Status: DISCONTINUED | OUTPATIENT
Start: 2020-02-29 | End: 2020-02-29

## 2020-02-28 RX ORDER — METOPROLOL TARTRATE 5 MG/5ML
5 INJECTION INTRAVENOUS ONCE
Status: COMPLETED | OUTPATIENT
Start: 2020-02-28 | End: 2020-02-28

## 2020-02-28 RX ORDER — IPRATROPIUM BROMIDE AND ALBUTEROL SULFATE 2.5; .5 MG/3ML; MG/3ML
1 SOLUTION RESPIRATORY (INHALATION) EVERY 6 HOURS PRN
COMMUNITY

## 2020-02-28 RX ORDER — POLYETHYLENE GLYCOL 3350 17 G/17G
17 POWDER, FOR SOLUTION ORAL DAILY PRN
Status: DISCONTINUED | OUTPATIENT
Start: 2020-02-28 | End: 2020-03-03 | Stop reason: HOSPADM

## 2020-02-28 RX ORDER — DIGOXIN 0.25 MG/ML
125 INJECTION INTRAMUSCULAR; INTRAVENOUS ONCE
Status: COMPLETED | OUTPATIENT
Start: 2020-02-28 | End: 2020-02-28

## 2020-02-28 RX ORDER — ACYCLOVIR 400 MG/1
400 TABLET ORAL 3 TIMES DAILY
Status: DISCONTINUED | OUTPATIENT
Start: 2020-02-28 | End: 2020-03-03 | Stop reason: HOSPADM

## 2020-02-28 RX ORDER — SODIUM CHLORIDE 9 MG/ML
INJECTION, SOLUTION INTRAVENOUS CONTINUOUS
Status: DISCONTINUED | OUTPATIENT
Start: 2020-02-28 | End: 2020-02-29

## 2020-02-28 RX ORDER — PREDNISONE 10 MG/1
10 TABLET ORAL DAILY
COMMUNITY

## 2020-02-28 RX ORDER — ONDANSETRON 2 MG/ML
4 INJECTION INTRAMUSCULAR; INTRAVENOUS EVERY 6 HOURS PRN
Status: DISCONTINUED | OUTPATIENT
Start: 2020-02-28 | End: 2020-03-03 | Stop reason: HOSPADM

## 2020-02-28 RX ORDER — SODIUM CHLORIDE 0.9 % (FLUSH) 0.9 %
10 SYRINGE (ML) INJECTION EVERY 12 HOURS SCHEDULED
Status: DISCONTINUED | OUTPATIENT
Start: 2020-02-28 | End: 2020-03-03 | Stop reason: HOSPADM

## 2020-02-28 RX ORDER — ASPIRIN 81 MG/1
81 TABLET, CHEWABLE ORAL DAILY
Status: DISCONTINUED | OUTPATIENT
Start: 2020-02-29 | End: 2020-03-03 | Stop reason: HOSPADM

## 2020-02-28 RX ORDER — ACETAMINOPHEN 650 MG/1
650 SUPPOSITORY RECTAL EVERY 6 HOURS PRN
Status: DISCONTINUED | OUTPATIENT
Start: 2020-02-28 | End: 2020-03-03 | Stop reason: HOSPADM

## 2020-02-28 RX ORDER — ASPIRIN 81 MG/1
81 TABLET ORAL DAILY
Status: DISCONTINUED | OUTPATIENT
Start: 2020-02-29 | End: 2020-02-28

## 2020-02-28 RX ORDER — METOPROLOL TARTRATE 50 MG/1
50 TABLET, FILM COATED ORAL 2 TIMES DAILY
Status: DISCONTINUED | OUTPATIENT
Start: 2020-02-28 | End: 2020-03-03

## 2020-02-28 RX ORDER — FUROSEMIDE 20 MG/1
20 TABLET ORAL DAILY
COMMUNITY

## 2020-02-28 RX ORDER — ATORVASTATIN CALCIUM 20 MG/1
20 TABLET, FILM COATED ORAL NIGHTLY
Status: DISCONTINUED | OUTPATIENT
Start: 2020-02-28 | End: 2020-03-03 | Stop reason: HOSPADM

## 2020-02-28 RX ORDER — SODIUM CHLORIDE 0.9 % (FLUSH) 0.9 %
10 SYRINGE (ML) INJECTION PRN
Status: DISCONTINUED | OUTPATIENT
Start: 2020-02-28 | End: 2020-03-03 | Stop reason: HOSPADM

## 2020-02-28 RX ORDER — LEVOFLOXACIN 750 MG/1
750 TABLET ORAL DAILY
Status: ON HOLD | COMMUNITY
End: 2020-03-03 | Stop reason: HOSPADM

## 2020-02-28 RX ORDER — 0.9 % SODIUM CHLORIDE 0.9 %
1000 INTRAVENOUS SOLUTION INTRAVENOUS ONCE
Status: COMPLETED | OUTPATIENT
Start: 2020-02-28 | End: 2020-02-28

## 2020-02-28 RX ORDER — PROMETHAZINE HYDROCHLORIDE 12.5 MG/1
12.5 TABLET ORAL EVERY 6 HOURS PRN
Status: DISCONTINUED | OUTPATIENT
Start: 2020-02-28 | End: 2020-03-03 | Stop reason: HOSPADM

## 2020-02-28 RX ORDER — METOPROLOL TARTRATE 5 MG/5ML
INJECTION INTRAVENOUS
Status: COMPLETED
Start: 2020-02-28 | End: 2020-02-28

## 2020-02-28 RX ORDER — FUROSEMIDE 10 MG/ML
20 INJECTION INTRAMUSCULAR; INTRAVENOUS DAILY
Status: DISCONTINUED | OUTPATIENT
Start: 2020-02-29 | End: 2020-02-28

## 2020-02-28 RX ORDER — LEVOFLOXACIN 750 MG/1
750 TABLET ORAL DAILY
Status: DISCONTINUED | OUTPATIENT
Start: 2020-02-29 | End: 2020-02-29

## 2020-02-28 RX ORDER — METOPROLOL TARTRATE 5 MG/5ML
5 INJECTION INTRAVENOUS EVERY 6 HOURS PRN
Status: DISCONTINUED | OUTPATIENT
Start: 2020-02-28 | End: 2020-03-03 | Stop reason: HOSPADM

## 2020-02-28 RX ORDER — ACETAMINOPHEN 325 MG/1
650 TABLET ORAL EVERY 6 HOURS PRN
Status: DISCONTINUED | OUTPATIENT
Start: 2020-02-28 | End: 2020-03-03 | Stop reason: HOSPADM

## 2020-02-28 RX ORDER — DILTIAZEM HYDROCHLORIDE 5 MG/ML
INJECTION INTRAVENOUS
Status: DISCONTINUED
Start: 2020-02-28 | End: 2020-02-29

## 2020-02-28 RX ORDER — DILTIAZEM HYDROCHLORIDE 120 MG/1
120 CAPSULE, EXTENDED RELEASE ORAL 2 TIMES DAILY
COMMUNITY

## 2020-02-28 RX ORDER — DILTIAZEM HYDROCHLORIDE 5 MG/ML
5 INJECTION INTRAVENOUS ONCE
Status: COMPLETED | OUTPATIENT
Start: 2020-02-28 | End: 2020-02-28

## 2020-02-28 RX ORDER — BUDESONIDE AND FORMOTEROL FUMARATE DIHYDRATE 160; 4.5 UG/1; UG/1
2 AEROSOL RESPIRATORY (INHALATION) 2 TIMES DAILY
Status: DISCONTINUED | OUTPATIENT
Start: 2020-02-28 | End: 2020-02-29

## 2020-02-28 RX ORDER — DILTIAZEM HYDROCHLORIDE 120 MG/1
120 CAPSULE, EXTENDED RELEASE ORAL 2 TIMES DAILY
Status: DISCONTINUED | OUTPATIENT
Start: 2020-02-28 | End: 2020-02-29

## 2020-02-28 RX ORDER — PREDNISOLONE ACETATE 10 MG/ML
1 SUSPENSION/ DROPS OPHTHALMIC DAILY
Status: DISCONTINUED | OUTPATIENT
Start: 2020-02-29 | End: 2020-03-03 | Stop reason: HOSPADM

## 2020-02-28 RX ORDER — PREDNISONE 10 MG/1
10 TABLET ORAL DAILY
Status: DISCONTINUED | OUTPATIENT
Start: 2020-02-29 | End: 2020-02-29

## 2020-02-28 RX ADMIN — ACYCLOVIR 400 MG: 400 TABLET ORAL at 23:45

## 2020-02-28 RX ADMIN — ATORVASTATIN CALCIUM 20 MG: 20 TABLET, FILM COATED ORAL at 23:45

## 2020-02-28 RX ADMIN — DIGOXIN 125 MCG: 250 INJECTION, SOLUTION INTRAMUSCULAR; INTRAVENOUS; PARENTERAL at 20:08

## 2020-02-28 RX ADMIN — ENOXAPARIN SODIUM 80 MG: 80 INJECTION SUBCUTANEOUS at 23:12

## 2020-02-28 RX ADMIN — SODIUM CHLORIDE 1000 ML: 9 INJECTION, SOLUTION INTRAVENOUS at 19:20

## 2020-02-28 RX ADMIN — METOPROLOL TARTRATE 5 MG: 1 INJECTION, SOLUTION INTRAVENOUS at 19:15

## 2020-02-28 RX ADMIN — METOPROLOL TARTRATE 5 MG: 1 INJECTION, SOLUTION INTRAVENOUS at 19:43

## 2020-02-28 RX ADMIN — DILTIAZEM HYDROCHLORIDE 5 MG/HR: 5 INJECTION INTRAVENOUS at 23:56

## 2020-02-28 RX ADMIN — SODIUM CHLORIDE: 9 INJECTION, SOLUTION INTRAVENOUS at 23:45

## 2020-02-28 RX ADMIN — DILTIAZEM HYDROCHLORIDE 5 MG: 5 INJECTION INTRAVENOUS at 18:53

## 2020-02-28 RX ADMIN — METOPROLOL TARTRATE 5 MG: 1 INJECTION, SOLUTION INTRAVENOUS at 19:56

## 2020-02-28 NOTE — ED NOTES
Bed: 05  Expected date: 2/28/20  Expected time: 5:58 PM  Means of arrival: [facility]  Comments:  80 female, a-fib RVR, maciel Figueroa, DEDRICK  02/28/20 1689

## 2020-02-28 NOTE — ED TRIAGE NOTES
Pt arrived via ems from [facility] with c/o sob and new onset a-fib. Pt a/o x 3 skin pink w/d resp slt labored.  Pt reports slt more sob then normal.o/e 3+ pitting edema noted lungs bibasilar crackles to mid lobe

## 2020-02-28 NOTE — ED NOTES
5 mg cardizem administered at this time, per KARINA Zapien.   Pt's bp 93/68;  bpm.   Amiodarone to be held, per Tyrell Carroll NP.       Josiane Jimenez RN  02/28/20 2236

## 2020-02-29 ENCOUNTER — APPOINTMENT (OUTPATIENT)
Dept: GENERAL RADIOLOGY | Age: 85
DRG: 280 | End: 2020-02-29
Payer: MEDICARE

## 2020-02-29 ENCOUNTER — APPOINTMENT (OUTPATIENT)
Dept: CT IMAGING | Age: 85
DRG: 280 | End: 2020-02-29
Payer: MEDICARE

## 2020-02-29 LAB
ANION GAP SERPL CALCULATED.3IONS-SCNC: 9 MEQ/L (ref 9–15)
BASOPHILS ABSOLUTE: 0 K/UL (ref 0–0.2)
BASOPHILS RELATIVE PERCENT: 0.1 %
BILIRUBIN URINE: NEGATIVE
BLOOD, URINE: NEGATIVE
BUN BLDV-MCNC: 18 MG/DL (ref 8–23)
CALCIUM SERPL-MCNC: 8.5 MG/DL (ref 8.5–9.9)
CHLORIDE BLD-SCNC: 98 MEQ/L (ref 95–107)
CLARITY: CLEAR
CO2: 37 MEQ/L (ref 20–31)
COLOR: YELLOW
CREAT SERPL-MCNC: 0.45 MG/DL (ref 0.5–0.9)
D DIMER: 1.49 MG/L FEU (ref 0–0.5)
DIGOXIN LEVEL: 4.5 NG/ML (ref 0.8–2)
EKG ATRIAL RATE: 441 BPM
EKG ATRIAL RATE: 64 BPM
EKG ATRIAL RATE: 68 BPM
EKG P-R INTERVAL: 112 MS
EKG Q-T INTERVAL: 234 MS
EKG Q-T INTERVAL: 356 MS
EKG Q-T INTERVAL: 360 MS
EKG QRS DURATION: 74 MS
EKG QRS DURATION: 76 MS
EKG QRS DURATION: 80 MS
EKG QTC CALCULATION (BAZETT): 366 MS
EKG QTC CALCULATION (BAZETT): 370 MS
EKG QTC CALCULATION (BAZETT): 410 MS
EKG R AXIS: 15 DEGREES
EKG R AXIS: 19 DEGREES
EKG R AXIS: 7 DEGREES
EKG T AXIS: 174 DEGREES
EKG T AXIS: 46 DEGREES
EKG T AXIS: 54 DEGREES
EKG VENTRICULAR RATE: 147 BPM
EKG VENTRICULAR RATE: 65 BPM
EKG VENTRICULAR RATE: 78 BPM
EOSINOPHILS ABSOLUTE: 0 K/UL (ref 0–0.7)
EOSINOPHILS RELATIVE PERCENT: 0.2 %
GFR AFRICAN AMERICAN: >60
GFR NON-AFRICAN AMERICAN: >60
GLUCOSE BLD-MCNC: 101 MG/DL (ref 70–99)
GLUCOSE URINE: NEGATIVE MG/DL
HCT VFR BLD CALC: 31.7 % (ref 37–47)
HEMOGLOBIN: 10 G/DL (ref 12–16)
INFLUENZA A BY PCR: NEGATIVE
INFLUENZA B BY PCR: POSITIVE
KETONES, URINE: NEGATIVE MG/DL
LEUKOCYTE ESTERASE, URINE: NEGATIVE
LYMPHOCYTES ABSOLUTE: 0.3 K/UL (ref 1–4.8)
LYMPHOCYTES RELATIVE PERCENT: 5.1 %
MCH RBC QN AUTO: 32.2 PG (ref 27–31.3)
MCHC RBC AUTO-ENTMCNC: 31.5 % (ref 33–37)
MCV RBC AUTO: 102.3 FL (ref 82–100)
MONOCYTES ABSOLUTE: 0.5 K/UL (ref 0.2–0.8)
MONOCYTES RELATIVE PERCENT: 8.7 %
NEUTROPHILS ABSOLUTE: 5.4 K/UL (ref 1.4–6.5)
NEUTROPHILS RELATIVE PERCENT: 85.9 %
NITRITE, URINE: NEGATIVE
PDW BLD-RTO: 15.9 % (ref 11.5–14.5)
PH UA: 6 (ref 5–9)
PLATELET # BLD: 126 K/UL (ref 130–400)
POTASSIUM REFLEX MAGNESIUM: 3.7 MEQ/L (ref 3.4–4.9)
PROCALCITONIN: 0.08 NG/ML (ref 0–0.15)
PROTEIN UA: NEGATIVE MG/DL
RBC # BLD: 3.1 M/UL (ref 4.2–5.4)
SODIUM BLD-SCNC: 144 MEQ/L (ref 135–144)
SPECIFIC GRAVITY UA: 1.01 (ref 1–1.03)
TROPONIN: 0.01 NG/ML (ref 0–0.01)
TROPONIN: 0.02 NG/ML (ref 0–0.01)
URINE REFLEX TO CULTURE: NORMAL
UROBILINOGEN, URINE: 0.2 E.U./DL
WBC # BLD: 6.3 K/UL (ref 4.8–10.8)

## 2020-02-29 PROCEDURE — 2060000000 HC ICU INTERMEDIATE R&B

## 2020-02-29 PROCEDURE — 2580000003 HC RX 258: Performed by: INTERNAL MEDICINE

## 2020-02-29 PROCEDURE — 94640 AIRWAY INHALATION TREATMENT: CPT

## 2020-02-29 PROCEDURE — 87502 INFLUENZA DNA AMP PROBE: CPT

## 2020-02-29 PROCEDURE — 6360000002 HC RX W HCPCS: Performed by: INTERNAL MEDICINE

## 2020-02-29 PROCEDURE — 6370000000 HC RX 637 (ALT 250 FOR IP): Performed by: PHYSICIAN ASSISTANT

## 2020-02-29 PROCEDURE — 80048 BASIC METABOLIC PNL TOTAL CA: CPT

## 2020-02-29 PROCEDURE — 6370000000 HC RX 637 (ALT 250 FOR IP): Performed by: INTERNAL MEDICINE

## 2020-02-29 PROCEDURE — 71045 X-RAY EXAM CHEST 1 VIEW: CPT

## 2020-02-29 PROCEDURE — 2580000003 HC RX 258: Performed by: PHYSICIAN ASSISTANT

## 2020-02-29 PROCEDURE — 84145 PROCALCITONIN (PCT): CPT

## 2020-02-29 PROCEDURE — 99222 1ST HOSP IP/OBS MODERATE 55: CPT | Performed by: INTERNAL MEDICINE

## 2020-02-29 PROCEDURE — 2700000000 HC OXYGEN THERAPY PER DAY

## 2020-02-29 PROCEDURE — 84484 ASSAY OF TROPONIN QUANT: CPT

## 2020-02-29 PROCEDURE — 85379 FIBRIN DEGRADATION QUANT: CPT

## 2020-02-29 PROCEDURE — 36415 COLL VENOUS BLD VENIPUNCTURE: CPT

## 2020-02-29 PROCEDURE — 6360000004 HC RX CONTRAST MEDICATION: Performed by: INTERNAL MEDICINE

## 2020-02-29 PROCEDURE — 80162 ASSAY OF DIGOXIN TOTAL: CPT

## 2020-02-29 PROCEDURE — 94761 N-INVAS EAR/PLS OXIMETRY MLT: CPT

## 2020-02-29 PROCEDURE — 6360000002 HC RX W HCPCS: Performed by: PHYSICIAN ASSISTANT

## 2020-02-29 PROCEDURE — 85025 COMPLETE CBC W/AUTO DIFF WBC: CPT

## 2020-02-29 PROCEDURE — 81003 URINALYSIS AUTO W/O SCOPE: CPT

## 2020-02-29 PROCEDURE — 71275 CT ANGIOGRAPHY CHEST: CPT

## 2020-02-29 RX ORDER — LEVALBUTEROL 1.25 MG/.5ML
1.25 SOLUTION, CONCENTRATE RESPIRATORY (INHALATION) EVERY 4 HOURS
Status: DISCONTINUED | OUTPATIENT
Start: 2020-02-29 | End: 2020-02-29

## 2020-02-29 RX ORDER — DIGOXIN 0.25 MG/ML
500 INJECTION INTRAMUSCULAR; INTRAVENOUS ONCE
Status: COMPLETED | OUTPATIENT
Start: 2020-02-29 | End: 2020-02-29

## 2020-02-29 RX ORDER — METHYLPREDNISOLONE SODIUM SUCCINATE 40 MG/ML
40 INJECTION, POWDER, LYOPHILIZED, FOR SOLUTION INTRAMUSCULAR; INTRAVENOUS DAILY
Status: DISCONTINUED | OUTPATIENT
Start: 2020-02-29 | End: 2020-03-03

## 2020-02-29 RX ORDER — LEVALBUTEROL 1.25 MG/.5ML
1.25 SOLUTION, CONCENTRATE RESPIRATORY (INHALATION) EVERY 8 HOURS PRN
Status: DISCONTINUED | OUTPATIENT
Start: 2020-02-29 | End: 2020-03-03 | Stop reason: HOSPADM

## 2020-02-29 RX ORDER — FUROSEMIDE 10 MG/ML
INJECTION INTRAMUSCULAR; INTRAVENOUS
Status: DISCONTINUED
Start: 2020-02-29 | End: 2020-02-29

## 2020-02-29 RX ORDER — METHYLPREDNISOLONE SODIUM SUCCINATE 40 MG/ML
40 INJECTION, POWDER, LYOPHILIZED, FOR SOLUTION INTRAMUSCULAR; INTRAVENOUS ONCE
Status: DISCONTINUED | OUTPATIENT
Start: 2020-02-29 | End: 2020-02-29 | Stop reason: DRUGHIGH

## 2020-02-29 RX ORDER — OSELTAMIVIR PHOSPHATE 75 MG/1
75 CAPSULE ORAL 2 TIMES DAILY
Status: DISCONTINUED | OUTPATIENT
Start: 2020-02-29 | End: 2020-03-03 | Stop reason: HOSPADM

## 2020-02-29 RX ORDER — DILTIAZEM HYDROCHLORIDE 120 MG/1
120 CAPSULE, COATED, EXTENDED RELEASE ORAL DAILY
Status: DISCONTINUED | OUTPATIENT
Start: 2020-02-29 | End: 2020-03-02

## 2020-02-29 RX ORDER — BUDESONIDE 0.5 MG/2ML
0.5 INHALANT ORAL 2 TIMES DAILY
Status: DISCONTINUED | OUTPATIENT
Start: 2020-02-29 | End: 2020-03-03 | Stop reason: HOSPADM

## 2020-02-29 RX ORDER — LEVALBUTEROL 1.25 MG/.5ML
1.25 SOLUTION, CONCENTRATE RESPIRATORY (INHALATION) 3 TIMES DAILY
Status: DISCONTINUED | OUTPATIENT
Start: 2020-02-29 | End: 2020-03-03 | Stop reason: HOSPADM

## 2020-02-29 RX ORDER — FUROSEMIDE 10 MG/ML
20 INJECTION INTRAMUSCULAR; INTRAVENOUS 2 TIMES DAILY
Status: DISCONTINUED | OUTPATIENT
Start: 2020-02-29 | End: 2020-03-01

## 2020-02-29 RX ORDER — FUROSEMIDE 10 MG/ML
20 INJECTION INTRAMUSCULAR; INTRAVENOUS ONCE
Status: COMPLETED | OUTPATIENT
Start: 2020-02-29 | End: 2020-02-29

## 2020-02-29 RX ADMIN — OSELTAMIVIR PHOSPHATE 75 MG: 75 CAPSULE ORAL at 20:56

## 2020-02-29 RX ADMIN — OSELTAMIVIR PHOSPHATE 75 MG: 75 CAPSULE ORAL at 13:21

## 2020-02-29 RX ADMIN — Medication 10 ML: at 08:59

## 2020-02-29 RX ADMIN — ACYCLOVIR 400 MG: 400 TABLET ORAL at 08:53

## 2020-02-29 RX ADMIN — METOPROLOL TARTRATE 50 MG: 50 TABLET, FILM COATED ORAL at 08:53

## 2020-02-29 RX ADMIN — FUROSEMIDE 20 MG: 10 INJECTION, SOLUTION INTRAMUSCULAR; INTRAVENOUS at 06:44

## 2020-02-29 RX ADMIN — ACYCLOVIR 400 MG: 400 TABLET ORAL at 20:56

## 2020-02-29 RX ADMIN — LEVALBUTEROL 1.25 MG: 1.25 SOLUTION, CONCENTRATE RESPIRATORY (INHALATION) at 07:57

## 2020-02-29 RX ADMIN — METOPROLOL TARTRATE 50 MG: 50 TABLET, FILM COATED ORAL at 20:56

## 2020-02-29 RX ADMIN — PREDNISOLONE ACETATE 1 DROP: 10 SUSPENSION/ DROPS OPHTHALMIC at 09:00

## 2020-02-29 RX ADMIN — ENOXAPARIN SODIUM 80 MG: 80 INJECTION SUBCUTANEOUS at 20:55

## 2020-02-29 RX ADMIN — DIGOXIN 500 MCG: 250 INJECTION, SOLUTION INTRAMUSCULAR; INTRAVENOUS; PARENTERAL at 08:53

## 2020-02-29 RX ADMIN — LEVALBUTEROL 1.25 MG: 1.25 SOLUTION, CONCENTRATE RESPIRATORY (INHALATION) at 13:56

## 2020-02-29 RX ADMIN — LEVALBUTEROL 1.25 MG: 1.25 SOLUTION, CONCENTRATE RESPIRATORY (INHALATION) at 20:25

## 2020-02-29 RX ADMIN — MAGNESIUM SULFATE HEPTAHYDRATE 3 G: 500 INJECTION, SOLUTION INTRAMUSCULAR; INTRAVENOUS at 16:24

## 2020-02-29 RX ADMIN — ENOXAPARIN SODIUM 80 MG: 80 INJECTION SUBCUTANEOUS at 08:53

## 2020-02-29 RX ADMIN — ATORVASTATIN CALCIUM 20 MG: 20 TABLET, FILM COATED ORAL at 20:56

## 2020-02-29 RX ADMIN — ASPIRIN 81 MG 81 MG: 81 TABLET ORAL at 08:53

## 2020-02-29 RX ADMIN — FUROSEMIDE 20 MG: 10 INJECTION, SOLUTION INTRAVENOUS at 08:55

## 2020-02-29 RX ADMIN — FUROSEMIDE 20 MG: 10 INJECTION, SOLUTION INTRAVENOUS at 18:40

## 2020-02-29 RX ADMIN — IOPAMIDOL 100 ML: 755 INJECTION, SOLUTION INTRAVENOUS at 13:45

## 2020-02-29 RX ADMIN — DILTIAZEM HYDROCHLORIDE 120 MG: 120 CAPSULE, COATED, EXTENDED RELEASE ORAL at 18:39

## 2020-02-29 RX ADMIN — METHYLPREDNISOLONE SODIUM SUCCINATE 40 MG: 40 INJECTION, POWDER, FOR SOLUTION INTRAMUSCULAR; INTRAVENOUS at 08:53

## 2020-02-29 RX ADMIN — ACYCLOVIR 400 MG: 400 TABLET ORAL at 13:21

## 2020-02-29 RX ADMIN — Medication 10 ML: at 20:56

## 2020-02-29 RX ADMIN — BUDESONIDE 500 MCG: 0.5 SUSPENSION RESPIRATORY (INHALATION) at 20:25

## 2020-02-29 ASSESSMENT — ENCOUNTER SYMPTOMS
COUGH: 1
COUGH: 0
DIARRHEA: 0
CHEST TIGHTNESS: 0
FACIAL SWELLING: 0
WHEEZING: 0
VOMITING: 0
BLOOD IN STOOL: 0
SINUS PRESSURE: 0
COLOR CHANGE: 0
WHEEZING: 1
ABDOMINAL DISTENTION: 1
RHINORRHEA: 0
SHORTNESS OF BREATH: 1
CONSTIPATION: 0
BACK PAIN: 0
EYE REDNESS: 0
TROUBLE SWALLOWING: 0
NAUSEA: 0
EYE DISCHARGE: 0
SORE THROAT: 0
EYE PAIN: 0
ABDOMINAL PAIN: 0

## 2020-02-29 ASSESSMENT — PAIN SCALES - GENERAL: PAINLEVEL_OUTOF10: 0

## 2020-02-29 NOTE — PROGRESS NOTES
Wt: 184 lb (83.5 kg)(2/28 Stated)  · Usual Body Wt: 190 lb (86.2 kg)(1/9 per [facility] EMR; ~183 lbs. per sister)  · % Weight Change:  ,  3.7% (7 lbs. ) x 1.5 months; other wt hx stated wts. · Ideal Body Wt: 125 lb (56.7 kg), % Ideal Body >100%  · BMI Classification: BMI 30.0 - 34.9 Obese Class I    Nutrition Interventions:   Modify current diet, Start ONS(Remove cardiac restriction, add 2 gm sodium; standard high calorie ONS, frozen ONS, fortified pudding each once daily)  Continued Inpatient Monitoring, Education not appropriate at this time    Nutrition Evaluation:   · Evaluation: Goals set   · Goals: PO intake >50% of meals, >75% ONS. Weight stable ~183 lbs.     · Monitoring: Meal Intake, Supplement Intake, Weight, Pertinent Labs      Electronically signed by Rivas Gonzalez RD, ALFREDO on 2/29/20 at 2:40 PM

## 2020-02-29 NOTE — PROGRESS NOTES
Disorder  (acute or chronic)  [x]   Severe or Chronic w/ Exacerbation  []     Surgical Status No [x]   Surgeries     General []   Surgery Lower []   Abdominal Thoracic or []   Upper Abdominal Thoracic with  PulmonaryDisorder  []     Chest X-ray Clear/Not  Ordered     []  Chronic Changes  Results Pending  [x]  Infiltrates, atelectasis, pleural effusion, or edema  []  Infiltrates in more than one lobe []  Infiltrate + Atelectasis, &/or pleural effusion  []    Respiratory Pattern Regular,  RR = 12-20 []  Increased,  RR = 21-25 [x]  POWELL, irregular,  or RR = 26-30 []  Decreased FEV1  or RR = 31-35 []  Severe SOB, use  of accessory muscles, or RR ? 35  []    Mental Status Alert, oriented,  Cooperative [x]  Confused but Follows commands []  Lethargic or unable to follow commands []  Obtunded  []  Comatose  []    Breath Sounds Clear to  auscultation  []  Decreased unilaterally or  in bases only []  Decreased  bilaterally  []  Crackles or intermittent wheezes [x]  Wheezes []    Cough Strong, Spontan., & nonproductive [x]  Strong,  spontaneous, &  productive []  Weak,  Nonproductive []  Weak, productive or  with wheezes []  No spontaneous  cough or may require suctioning []    Level of Activity Ambulatory []  Ambulatory w/ Assist  []  Non-ambulatory [x]  Paraplegic []  Quadriplegic []    Total    Score:__10  _____     Triage Score:__4______      Tri       Triage:     1. (>20) Freq: Q3    2. (16-20) Freq: Q4   3. (11-15) Freq: QID & Albuterol Q2 PRN    4. (6-10) Freq: TID & Albuterol Q2 PRN    5. (0-5) Freq Q4prn

## 2020-02-29 NOTE — CONSULTS
Appearance: She is ill-appearing. HENT:      Head: Normocephalic and atraumatic. Eyes:      Pupils: Pupils are equal, round, and reactive to light. Neck:      Thyroid: No thyromegaly. Trachea: No tracheal deviation. Cardiovascular:      Rate and Rhythm: Rhythm irregularly irregular. Heart sounds: Normal heart sounds. No murmur. No gallop. Pulmonary:      Effort: Tachypnea present. Breath sounds: Wheezing and rales present. Chest:      Chest wall: No tenderness. Abdominal:      General: Bowel sounds are normal. There is no distension. Palpations: Abdomen is soft. There is no mass. Tenderness: There is abdominal tenderness. There is no guarding or rebound. Musculoskeletal: Normal range of motion. General: No tenderness. Right lower le+ Edema present. Left lower le+ Edema present. Lymphadenopathy:      Cervical: No cervical adenopathy. Skin:     General: Skin is warm and dry. Findings: Ecchymosis present. No erythema or rash. Neurological:      Mental Status: She is oriented to person, place, and time. Coordination: Coordination normal.   Psychiatric:         Behavior: Behavior normal.         Thought Content:  Thought content normal.         LABS:  CBC:   Lab Results   Component Value Date    WBC 6.3 2020    RBC 3.10 2020    HGB 10.0 2020    HCT 31.7 2020    .3 2020    MCH 32.2 2020    MCHC 31.5 2020    RDW 15.9 2020     2020     CBC with Differential:    Lab Results   Component Value Date    WBC 6.3 2020    RBC 3.10 2020    HGB 10.0 2020    HCT 31.7 2020     2020    .3 2020    MCH 32.2 2020    MCHC 31.5 2020    RDW 15.9 2020    LYMPHOPCT 5.1 2020    MONOPCT 8.7 2020    BASOPCT 0.1 2020    MONOSABS 0.5 2020    LYMPHSABS 0.3 2020    EOSABS 0.0 2020 BASOSABS 0.0 02/29/2020     CMP:    Lab Results   Component Value Date     02/29/2020    K 3.7 02/29/2020    CL 98 02/29/2020    CO2 37 02/29/2020    BUN 18 02/29/2020    CREATININE 0.45 02/29/2020    GFRAA >60.0 02/29/2020    LABGLOM >60.0 02/29/2020    GLUCOSE 101 02/29/2020    PROT 5.4 02/28/2020    LABALBU 2.9 02/28/2020    CALCIUM 8.5 02/29/2020    BILITOT <0.2 02/28/2020    ALKPHOS 82 02/28/2020    AST 22 02/28/2020    ALT 19 02/28/2020     BMP:    Lab Results   Component Value Date     02/29/2020    K 3.7 02/29/2020    CL 98 02/29/2020    CO2 37 02/29/2020    BUN 18 02/29/2020    LABALBU 2.9 02/28/2020    CREATININE 0.45 02/29/2020    CALCIUM 8.5 02/29/2020    GFRAA >60.0 02/29/2020    LABGLOM >60.0 02/29/2020    GLUCOSE 101 02/29/2020     Magnesium:  Lab Results   Component Value Date    MG 1.9 02/28/2020     Troponin:    Lab Results   Component Value Date    TROPONINI 0.014 02/29/2020           Assessment:    Active Hospital Problems    Diagnosis Date Noted    Atrial fibrillation with RVR ([facility] [address] [I48.91] 02/28/2020          Plan:  1. NEW onset A-fib (no previous record of PAF or A-bib) - not on [facility]  2. Hx of FF with LVEF of 65- from echo from 2/5/20- done during her last admission  3. NSTEMI- with troponin's trending down and no deviance of  Any kidney injury, but continued underlying pulmonary problems  4.   earlier this month- pt was found to have pleural based nodules and infiltrates- and was on Levaquin   5.   as previously stated pt does have known aortic ectasia- Not aneurismal  6. Pt was hypoxic with elevated RR- d-dimer ordered- possible CT r/o PE  7. [place]- for acuate I&O  8  Lasix IV d/t SOB/ wheezing and labored breathing  9) titrated cardizem drip  10 continue  PO lopressor     New onset afib with RVR in setting of new diagnosis of influenza B. Does have pneumonia as well as recent enzyme elevation. Now converted to NSR. Rate control and lovenox is instituted for now.   Mildly

## 2020-02-29 NOTE — ED PROVIDER NOTES
[facility] [address] ENCOUNTER      Pt Name: Mario Brunson  MRN: [id number]  [place] 1934  Date of evaluation: 2/28/2020  Provider: CHRISTOPHER Cao CNP    CHIEF COMPLAINT       Chief Complaint   Patient presents with    Shortness of Breath         HISTORY OF PRESENT ILLNESS   (Location/Symptom, Timing/Onset,Context/Setting, Quality, Duration, Modifying Factors, Severity)  Note limiting factors. Mario Brunson is a 80 y.o. female who presents to the emergency department with a chief complaint of shortness of breath. Patient reports she is unsure why she is here as she has no symptoms. Per nursing home staff, they were obtaining vitals and found patient to be in AFIB with RVR at an uncontrolled rate of 156 PTA. Patient was also found to be hypotensive but asymptomatic with it. She denies current SOB, CP, nausea, or vomiting. Nursing Notes were reviewed. REVIEW OF SYSTEMS    (2-9 systems for level 4, 10 or more for level 5)     Review of Systems   Constitutional: Negative for activity change, appetite change, fatigue and fever. HENT: Negative for congestion, ear pain, rhinorrhea, sore throat and trouble swallowing. Eyes: Negative for pain, discharge and redness. Respiratory: Positive for shortness of breath. Negative for cough and wheezing. Cardiovascular: Negative for chest pain and palpitations. Gastrointestinal: Negative for abdominal pain, blood in stool, constipation, diarrhea, nausea and vomiting. Endocrine: Negative for polydipsia and polyuria. Genitourinary: Negative for decreased urine volume, dysuria, flank pain and hematuria. Musculoskeletal: Negative for arthralgias, back pain and myalgias. Skin: Negative for color change, rash and wound. Neurological: Negative for dizziness, syncope, weakness, light-headedness and headaches. Psychiatric/Behavioral: Negative for behavioral problems. All other systems reviewed and are negative.       Except as noted above the remainder of the review of systems was reviewed and negative. PAST MEDICAL HISTORY     Past Medical History:   Diagnosis Date    Chronic diastolic CHF (congestive heart failure) ([address]     per echo 2/5/20    Compression fracture of L3 vertebra (HCC)     COPD (chronic obstructive pulmonary disease) (HCC)     Emphysema lung (HCC)     diffuse    HLD (hyperlipidemia)     Lung nodules     OA (osteoarthritis)     On home O2     Osteoporosis     Spinal stenosis of lumbar region with neurogenic claudication     Spondylosis     Tachycardia     Uveitis      Past Surgical History:   Procedure Laterality Date    KYPHOSIS SURGERY      T12     Social History     Socioeconomic History    Marital status:       Spouse name: None    Number of children: None    Years of education: None    Highest education level: None   Occupational History    None   Social Needs    Financial resource strain: None    Food insecurity:     Worry: None     Inability: None    Transportation needs:     Medical: None     Non-medical: None   Tobacco Use    Smoking status: Former Smoker    Smokeless tobacco: Never Used   Substance and Sexual Activity    Alcohol use: No    Drug use: No    Sexual activity: None   Lifestyle    Physical activity:     Days per week: None     Minutes per session: None    Stress: None   Relationships    Social connections:     Talks on phone: None     Gets together: None     Attends Denominational service: None     Active member of club or organization: None     Attends meetings of clubs or organizations: None     Relationship status: None    Intimate partner violence:     Fear of current or ex partner: None     Emotionally abused: None     Physically abused: None     Forced sexual activity: None   Other Topics Concern    None   Social History Narrative    None       SCREENINGS    [place] Coma Scale  Eye Opening: Spontaneous  Best Verbal Response: Oriented  Best Motor Radiologist below, if available at the time ofthis note:    XR CHEST PORTABLE   Final Result   NO INTERVAL CHANGE. RIGHT LOWER AND LEFT MID LUNG ATELECTASIS/PNEUMONIA.       XR CHEST PORTABLE    (Results Pending)         ED BEDSIDE ULTRASOUND:   Performed by ED Physician - none    LABS:  Labs Reviewed   COMPREHENSIVE METABOLIC PANEL - Abnormal; Notable for the following components:       Result Value    Chloride 91 (*)     CO2 40 (*)     Anion Gap 7 (*)     Glucose 151 (*)     CREATININE 0.49 (*)     Total Protein 5.4 (*)     Alb 2.9 (*)     All other components within normal limits    Narrative:     CALL  Jall[facility]  [facility] tel. [phone],  CO2 results called to and read back by lA Jennings NP, 02/28/2020 19:38, by  DAISY   CBC WITH AUTO DIFFERENTIAL - Abnormal; Notable for the following components:    RBC 3.02 (*)     Hemoglobin 9.9 (*)     Hematocrit 30.7 (*)     .4 (*)     MCH 32.8 (*)     MCHC 32.4 (*)     RDW 15.8 (*)     Platelets 146 (*)     Lymphocytes Absolute 0.3 (*)     All other components within normal limits   TROPONIN - Abnormal; Notable for the following components:    Troponin 0.024 (*)     All other components within normal limits    Narrative:     Faye Acevedo tel. [phone],  Trop results called to and read back by Al Jennings NP, 02/28/2020 19:39, by  Symone Figueroa  CO2 results called to and read back by Al Jennings NP, 02/28/2020 19:38, by  DAISY   TROPONIN - Abnormal; Notable for the following components:    Troponin 0.020 (*)     All other components within normal limits    Narrative:     CALL  Jalloh  [facility] tel. [phone],  Trop results called to and read back by Jakob Montemayor RN, 02/29/2020 00:55,  by DAISY   TROPONIN - Abnormal; Notable for the following components:    Troponin 0.014 (*)     All other components within normal limits    Narrative:     CALL  Jalloh  1W tel. [phone],  Previous panic on this admission - call not needed per SOP, 02/29/2020 07:21,  by Phyllis Clark PANEL W/ REFLEX TO MG FOR LOW K - Abnormal; Notable for the following components:    CO2 37 (*)     Glucose 101 (*)     CREATININE 0.45 (*)     All other components within normal limits   CBC WITH AUTO DIFFERENTIAL - Abnormal; Notable for the following components:    RBC 3.10 (*)     Hemoglobin 10.0 (*)     Hematocrit 31.7 (*)     .3 (*)     MCH 32.2 (*)     MCHC 31.5 (*)     RDW 15.9 (*)     Platelets 158 (*)     Lymphocytes Absolute 0.3 (*)     All other components within normal limits   RAPID INFLUENZA A/B ANTIGENS   MAGNESIUM    Narrative:     CALL  [facility]  [facility] tel. [phone],  CO2 results called to and read back by Roxi Cordero NP, 02/28/2020 19:38, by  [place]    Narrative:     Dre[facility]is Tsel tel. [phone],  Trop results called to and read back by Roxi Cordero NP, 02/28/2020 19:39, by  Lauren Zafar  CO2 results called to and read back by Roxi Cordero NP, 02/28/2020 19:38, by  DAISY   PROCALCITONIN   D-DIMER, QUANTITATIVE       All other labs were within normal range or not returned as of this dictation. EMERGENCY DEPARTMENT COURSE and DIFFERENTIAL DIAGNOSIS/MDM:   Vitals:    Vitals:    02/28/20 2350 02/29/20 0507 02/29/20 0620 02/29/20 0639   BP: (!) 123/59  122/79 (!) 120/57   Pulse: 79  135 99   Resp:    19   Temp:    98.4 °F (36.9 °C)   TempSrc:    Oral   SpO2:   93% 95%   Weight:  189 lb 9.6 oz (86 kg)              MDM   Patient is an 80year old female presenting for a chief complaint of AFIB with RVR, new onset. She is hypotensive but asymptomatic with it. She is asymptomatic now and has no CP or SOB. Cardizem, lopressor, and digoxin given to control patient rate. Consulted Dr. Kristin Perry who agrees with treatment plan and bringing patient inpatient. Patient admitted to service of hospitalist in stable condition.      CRITICAL CARE TIME       CONSULTS:  IP CONSULT TO CARDIOLOGY    PROCEDURES:  Unless otherwise noted below, none     Procedures    FINAL IMPRESSION

## 2020-02-29 NOTE — ED NOTES
Dr Quinn Roche aware that cardizem did not slow hr v.o. 5 mg lopressor iv to be given     Daisy Scruggs.  Thu Munoz  02/28/20 1907

## 2020-02-29 NOTE — H&P
than 5 days   Yes Historical Provider, MD   levoFLOXacin (LEVAQUIN) 750 MG tablet Take 750 mg by mouth daily   Yes Historical Provider, MD   magnesium hydroxide (MILK OF MAGNESIA) 400 MG/5ML suspension Take by mouth daily as needed for Constipation   Yes Historical Provider, MD   POTASSIUM CHLORIDE ER PO Take 10 mEq by mouth daily Give 20meq daily if edema lasts greater than 5 days   Yes Historical Provider, MD   predniSONE (DELTASONE) 10 MG tablet Take 10 mg by mouth daily As ordered   Yes Historical Provider, MD   budesonide-formoterol (SYMBICORT) 160-4.5 MCG/ACT AERO Inhale 2 puffs into the lungs 2 times daily 2/20/20  Yes Tom Cooper MD   aspirin 81 MG EC tablet Take 1 tablet by mouth daily 2/9/20  Yes Ozzy Nowak DO   metoprolol tartrate (LOPRESSOR) 50 MG tablet Take 1 tablet by mouth 2 times daily 2/8/20  Yes Ozzy Nowak,    atorvastatin (LIPITOR) 20 MG tablet Take 1 tablet by mouth nightly 2/8/20  Yes Ozzy Nowak DO   valACYclovir (VALTREX) 500 MG tablet Take 500 mg by mouth daily   Yes Historical Provider, MD   albuterol (PROVENTIL) (2.5 MG/3ML) 0.083% nebulizer solution Take 2.5 mg by nebulization every 4 hours as needed for Wheezing    Yes Historical Provider, MD   prednisoLONE acetate (PRED FORTE) 1 % ophthalmic suspension Place 1 drop into the right eye daily    Historical Provider, MD       Allergies:  Ibuprofen and Penicillins    Social History:      The patient currently lives F    TOBACCO:   reports that she has quit smoking. She has never used smokeless tobacco.  ETOH:   reports no history of alcohol use. Family History:       Positive as follows:        Family history unknown: Yes       REVIEW OF SYSTEMS:   Pertinent positives as noted in the HPI. All other systems reviewed and negative.     PHYSICAL EXAM:    BP (!) 98/58   Pulse 119   Temp 98 °F (36.7 °C) (Oral)   Resp 26   Wt 184 lb (83.5 kg)   SpO2 97%   BMI 30.62 kg/m²     General appearance:  No apparent

## 2020-02-29 NOTE — FLOWSHEET NOTE
Patient arrived on floor from ER. Admission assessment completed. Patient denies complaints of chest pain or shortness of breath. Patient with scattered bruising. Patient resting in bed. Voices no needs at this time.

## 2020-02-29 NOTE — PROGRESS NOTES
Department of Internal Medicine  Progress Note      SUBJECTIVE: Patient is in mild respiratory distress. Currently on 4 to 5 L nasal cannula with oxygen sats more than 95%. Complains of abdominal discomfort states last bowel movement was yesterday. No acute events overnight.        ROS:  All 12 systems reviewed and negative except mentioned in HPI and Assessment and plan    MEDICATIONS:  Current Facility-Administered Medications   Medication Dose Route Frequency Provider Last Rate Last Dose    levalbuterol (XOPENEX) nebulizer solution 1.25 mg  1.25 mg Nebulization Q4H Starr Harkins MD   1.25 mg at 02/29/20 0757    furosemide (LASIX) 10 MG/ML injection             furosemide (LASIX) injection 20 mg  20 mg Intravenous BID Brian Cordova MD        methylPREDNISolone sodium (SOLU-MEDROL) injection 40 mg  40 mg Intravenous Daily Brian Cordova MD        budesonide (PULMICORT) nebulizer suspension 500 mcg  0.5 mg Nebulization BID Brian Cordova MD        magnesium sulfate 3 g in dextrose 5 % 100 mL IVPB  3 g Intravenous Once Brian Cordova MD        digoxin (LANOXIN) injection 500 mcg  500 mcg Intravenous Once Brian Cordova MD        sodium chloride flush 0.9 % injection 10 mL  10 mL Intravenous 2 times per day Meldexteria REESE Aguilar        sodium chloride flush 0.9 % injection 10 mL  10 mL Intravenous PRN Melvinia Jazmine Good, PA        acetaminophen (TYLENOL) tablet 650 mg  650 mg Oral Q6H PRN Meldexteria REESE Aguilar        Or    acetaminophen (TYLENOL) suppository 650 mg  650 mg Rectal Q6H PRN Melvinia GlaREESE Mukherjee        polyethylene glycol (GLYCOLAX) packet 17 g  17 g Oral Daily PRN Melvinia REESE Aguilar        promethazine (PHENERGAN) tablet 12.5 mg  12.5 mg Oral Q6H PRN Meldexteria REESE Aguilar        Or    [facility] injection 4 mg  4 mg Intravenous Q6H PRN Benjennifera [place], [place]        aspirin chewable tablet 81 mg  81 mg Oral Daily Bennetta [place], [place]        enoxaparin (LOVENOX) injection 80 mg  1 mg[facility]kg Subcutaneous BID [facility]   80 mg at 02/28/20 2312    metoprolol (LOPRESSOR) injection 5 mg  5 mg Intravenous Q6H PRN [place], [place]        acyclovir (ZOVIRAX) tablet 400 mg  400 mg Oral TID Herrera Jim MD   400 mg at 02/28/20 2345    atorvastatin (LIPITOR) tablet 20 mg  20 mg Oral Nightly Herrera Jim MD   20 mg at 02/28/20 2345    levoFLOXacin (LEVAQUIN) tablet 750 mg  750 mg Oral Daily Herrera Jim MD        prednisoLONE acetate (PRED FORTE) 1 % ophthalmic suspension 1 drop  1 drop Right Eye Daily Herrera Jim MD       [facility] [place] by provider] metoprolol tartrate (LOPRESSOR) tablet 50 mg  50 mg Oral BID Herrera Jim MD        dilTIAZem 125 mg in dextrose 5 % 125 mL infusion  10 mg/hr Intravenous Continuous Brian Ba MD 5 mL/hr at 02/28/20 2356 5 mg/hr at 02/28/20 2356         OBJECTIVE:  Vital Signs:  Vitals:    02/29/20 0639   BP: (!) 120/57   Pulse: 99   Resp: 19   Temp: 98.4 °F (36.9 °C)   SpO2: 95%       Focal exam:  Patient has bilateral expiratory wheezing  She has bilateral crackles on auscultation  +2 edema of bilateral lower extremities  No abdominal tenderness    General Exam (except as mentioned above):  CONSTITUTIONAL: Awake, alert, no apparent distress  EYES:  PERRL, conjunctiva normal  ENT:  Normocephalic, atraumatic  NECK:  Supple  BACK:  Symmetric  LUNGS:  CTAB except bilateral basilar crackles. CARDIOVASCULAR:  S1S2 present  ABDOMEN:  soft, non-distended, non-tender  MUSCULOSKELETAL:  There is no redness, warmth, or swelling of the joints. NEUROLOGIC:  Alert, awake, oriented x 3. No FND  EXTREMITIES: Warm and well perfused.      LABS  Recent Labs     02/28/20  1854 02/29/20  0558   WBC 5.6 6.3   RBC 3.02* 3.10*   HGB 9.9* 10.0*   HCT 30.7* 31.7*   .4* 102.3*   MCH 32.8* 32.2*   MCHC 32.4* 31.5*   RDW 15.8* 15.9*   * 126*       Recent Labs     02/28/20  1854 02/29/20  0557    144   K 3.9 3.7 CL 91* 98   CO2 40* 37*   BUN 17 18   CREATININE 0.49* 0.45*   GLUCOSE 151* 101*   CALCIUM 8.9 8.5       Recent Labs     02/28/20  1854   MG 1.9           ASSESSMENT AND PLAN    Active Hospital Problems    Diagnosis Date Noted    Atrial fibrillation with RVR [facility]) [I48.91] 02/28/2020     80year-old female with past medical history of chronic diastolic heart failure, COPD, hypertension was sent from the nursing home with complaints of shortness of breath. Patient was diagnosed with new onset atrial fibrillation with rapid ventricular rate. A. fib with RVR: Continue Cardizem drip currently at 5 mg but increase that rate to 10 mg.  Heart rate between 1 5170. We will give 1 dose of digoxin 500 mcg. On Lovenox 1 mg/kg twice daily. Keep potassium more than 4. Replace magnesium will give 3 g of mag    Acute on chronic diastolic heart failure: Secondary to atrial fibrillation with RVR. Continue Lasix 20 mg IV twice daily    Acute bronchitis: Continue Levaquin. Acute exacerbation of COPD: Secondary to acute bronchitis. Continue Solu-Medrol, budesonide nebulization. DuoNebs.   And Xopenex    Patient states that she wants to be full code    DVT prophylaxis: Lovenox  Eboni eKlsey MD  Pager : [phone]

## 2020-03-01 LAB
ANION GAP SERPL CALCULATED.3IONS-SCNC: 10 MEQ/L (ref 9–15)
BASOPHILS ABSOLUTE: 0 K/UL (ref 0–0.2)
BASOPHILS RELATIVE PERCENT: 0.2 %
BUN BLDV-MCNC: 32 MG/DL (ref 8–23)
CALCIUM SERPL-MCNC: 8.3 MG/DL (ref 8.5–9.9)
CHLORIDE BLD-SCNC: 93 MEQ/L (ref 95–107)
CO2: 40 MEQ/L (ref 20–31)
CREAT SERPL-MCNC: 0.51 MG/DL (ref 0.5–0.9)
EOSINOPHILS ABSOLUTE: 0 K/UL (ref 0–0.7)
EOSINOPHILS RELATIVE PERCENT: 0.1 %
GFR AFRICAN AMERICAN: >60
GFR NON-AFRICAN AMERICAN: >60
GLUCOSE BLD-MCNC: 104 MG/DL (ref 70–99)
HCT VFR BLD CALC: 27.3 % (ref 37–47)
HEMOGLOBIN: 8.9 G/DL (ref 12–16)
LYMPHOCYTES ABSOLUTE: 0.6 K/UL (ref 1–4.8)
LYMPHOCYTES RELATIVE PERCENT: 10.6 %
MCH RBC QN AUTO: 33.7 PG (ref 27–31.3)
MCHC RBC AUTO-ENTMCNC: 32.8 % (ref 33–37)
MCV RBC AUTO: 102.9 FL (ref 82–100)
MONOCYTES ABSOLUTE: 0.8 K/UL (ref 0.2–0.8)
MONOCYTES RELATIVE PERCENT: 14.2 %
NEUTROPHILS ABSOLUTE: 4.1 K/UL (ref 1.4–6.5)
NEUTROPHILS RELATIVE PERCENT: 74.9 %
PDW BLD-RTO: 15.9 % (ref 11.5–14.5)
PLATELET # BLD: 125 K/UL (ref 130–400)
POTASSIUM REFLEX MAGNESIUM: 4 MEQ/L (ref 3.4–4.9)
RBC # BLD: 2.65 M/UL (ref 4.2–5.4)
SODIUM BLD-SCNC: 143 MEQ/L (ref 135–144)
WBC # BLD: 5.5 K/UL (ref 4.8–10.8)

## 2020-03-01 PROCEDURE — 36415 COLL VENOUS BLD VENIPUNCTURE: CPT

## 2020-03-01 PROCEDURE — 6370000000 HC RX 637 (ALT 250 FOR IP): Performed by: INTERNAL MEDICINE

## 2020-03-01 PROCEDURE — 6360000002 HC RX W HCPCS: Performed by: INTERNAL MEDICINE

## 2020-03-01 PROCEDURE — 2700000000 HC OXYGEN THERAPY PER DAY

## 2020-03-01 PROCEDURE — 94761 N-INVAS EAR/PLS OXIMETRY MLT: CPT

## 2020-03-01 PROCEDURE — 99233 SBSQ HOSP IP/OBS HIGH 50: CPT | Performed by: INTERNAL MEDICINE

## 2020-03-01 PROCEDURE — 2500000003 HC RX 250 WO HCPCS: Performed by: PHYSICIAN ASSISTANT

## 2020-03-01 PROCEDURE — 2580000003 HC RX 258: Performed by: PHYSICIAN ASSISTANT

## 2020-03-01 PROCEDURE — 6360000002 HC RX W HCPCS: Performed by: PHYSICIAN ASSISTANT

## 2020-03-01 PROCEDURE — 94640 AIRWAY INHALATION TREATMENT: CPT

## 2020-03-01 PROCEDURE — 6370000000 HC RX 637 (ALT 250 FOR IP): Performed by: PHYSICIAN ASSISTANT

## 2020-03-01 PROCEDURE — 80048 BASIC METABOLIC PNL TOTAL CA: CPT

## 2020-03-01 PROCEDURE — 85025 COMPLETE CBC W/AUTO DIFF WBC: CPT

## 2020-03-01 PROCEDURE — 2060000000 HC ICU INTERMEDIATE R&B

## 2020-03-01 RX ORDER — FUROSEMIDE 10 MG/ML
20 INJECTION INTRAMUSCULAR; INTRAVENOUS DAILY
Status: DISCONTINUED | OUTPATIENT
Start: 2020-03-02 | End: 2020-03-02

## 2020-03-01 RX ADMIN — Medication 10 ML: at 20:56

## 2020-03-01 RX ADMIN — ACYCLOVIR 400 MG: 400 TABLET ORAL at 15:58

## 2020-03-01 RX ADMIN — LEVALBUTEROL 1.25 MG: 1.25 SOLUTION, CONCENTRATE RESPIRATORY (INHALATION) at 13:57

## 2020-03-01 RX ADMIN — OSELTAMIVIR PHOSPHATE 75 MG: 75 CAPSULE ORAL at 20:55

## 2020-03-01 RX ADMIN — METOPROLOL TARTRATE 50 MG: 50 TABLET, FILM COATED ORAL at 20:55

## 2020-03-01 RX ADMIN — ACETAMINOPHEN 650 MG: 325 TABLET ORAL at 05:36

## 2020-03-01 RX ADMIN — METHYLPREDNISOLONE SODIUM SUCCINATE 40 MG: 40 INJECTION, POWDER, FOR SOLUTION INTRAMUSCULAR; INTRAVENOUS at 09:58

## 2020-03-01 RX ADMIN — Medication 10 ML: at 19:46

## 2020-03-01 RX ADMIN — ACYCLOVIR 400 MG: 400 TABLET ORAL at 09:58

## 2020-03-01 RX ADMIN — ACYCLOVIR 400 MG: 400 TABLET ORAL at 20:55

## 2020-03-01 RX ADMIN — Medication 10 ML: at 10:00

## 2020-03-01 RX ADMIN — BUDESONIDE 500 MCG: 0.5 SUSPENSION RESPIRATORY (INHALATION) at 07:16

## 2020-03-01 RX ADMIN — LEVALBUTEROL 1.25 MG: 1.25 SOLUTION, CONCENTRATE RESPIRATORY (INHALATION) at 07:16

## 2020-03-01 RX ADMIN — LEVALBUTEROL 1.25 MG: 1.25 SOLUTION, CONCENTRATE RESPIRATORY (INHALATION) at 20:06

## 2020-03-01 RX ADMIN — OSELTAMIVIR PHOSPHATE 75 MG: 75 CAPSULE ORAL at 09:58

## 2020-03-01 RX ADMIN — METOPROLOL TARTRATE 5 MG: 5 INJECTION INTRAVENOUS at 19:44

## 2020-03-01 RX ADMIN — ATORVASTATIN CALCIUM 20 MG: 20 TABLET, FILM COATED ORAL at 20:55

## 2020-03-01 RX ADMIN — METOPROLOL TARTRATE 50 MG: 50 TABLET, FILM COATED ORAL at 09:58

## 2020-03-01 RX ADMIN — BUDESONIDE 500 MCG: 0.5 SUSPENSION RESPIRATORY (INHALATION) at 20:06

## 2020-03-01 RX ADMIN — Medication 10 ML: at 05:42

## 2020-03-01 RX ADMIN — ENOXAPARIN SODIUM 80 MG: 80 INJECTION SUBCUTANEOUS at 09:57

## 2020-03-01 RX ADMIN — DILTIAZEM HYDROCHLORIDE 120 MG: 120 CAPSULE, COATED, EXTENDED RELEASE ORAL at 09:58

## 2020-03-01 RX ADMIN — ASPIRIN 81 MG 81 MG: 81 TABLET ORAL at 09:58

## 2020-03-01 RX ADMIN — FUROSEMIDE 20 MG: 10 INJECTION, SOLUTION INTRAVENOUS at 05:40

## 2020-03-01 ASSESSMENT — PAIN SCALES - GENERAL
PAINLEVEL_OUTOF10: 0
PAINLEVEL_OUTOF10: 8

## 2020-03-01 ASSESSMENT — ENCOUNTER SYMPTOMS
CONSTIPATION: 0
COUGH: 0
SHORTNESS OF BREATH: 1
ABDOMINAL PAIN: 0
DIARRHEA: 0
WHEEZING: 0
EYE DISCHARGE: 0
EYE REDNESS: 0
NAUSEA: 0
CHEST TIGHTNESS: 0
FACIAL SWELLING: 0
SINUS PRESSURE: 0
BACK PAIN: 0
SORE THROAT: 0
ABDOMINAL DISTENTION: 0

## 2020-03-01 NOTE — PROGRESS NOTES
02/28/20  1854   MG 1.9           ASSESSMENT AND PLAN    Active Hospital Problems    Diagnosis Date Noted    Atrial fibrillation with RVR [facility]) [I48.91] 02/28/2020     80year-old female with past medical history of chronic diastolic heart failure, COPD, hypertension was sent from the nursing home with complaints of shortness of breath. Patient was diagnosed with new onset atrial fibrillation with rapid ventricular rate.     Paroxysmal A. fib On Lovenox 1 mg/kg twice daily. Convertednormal sinus rhythm    Acute on chronic diastolic heart failure: Secondary to atrial fibrillation with RVR. Continue Lasix 20 mg IV twice daily     Acute bronchitis: Continue Levaquin. Influenza: Tamiflu day 2     Acute exacerbation of COPD: Secondary to acute bronchitis. Continue Solu-Medrol, budesonide nebulization. DuoNebs.   And Xopenex     Patient states that she wants to be full code       DVT prophylaxis: Lovenox  Jesus Alberto Parekh MD  Pager : [phone]'

## 2020-03-01 NOTE — PROGRESS NOTES
Progress Note  Patient: Ilene Pump  Unit/Bed:  R743/S932-55  YOB: 1934  MRN: [id number]  Acct: [de-identified]   Admitting Diagnosis: Atrial fibrillation with RVR ([facility] [address] [I48.91]  Admit Date:  2/28/2020  Hospital Day: 2    Chief Complaint:  SOB  A-fib with RVR- NEW    HPI     History of Present Illness:  Pt is a 80yr old female brought by EMS form nursing home with complaints of SOB. Pt just had a recent hospitalization    for COPD exacerbation but found to have sinus tachycardia and CT chest found ascending aortic ectasia.  NO apparent chest pain then. Pt does not have Chest pain at this time- but does have SOB with and without exacerbation     Subjective  3/1/20  Pt much improved in abdominal distention with chandler insertion   she also converted to SR right after bladder decompression and she has not gone back to A-fib on tele since. She did test positive for influenza- and she has been tx recently for pneumonia and weakness. She states she feels much better    Review of Systems:   Review of Systems   Constitutional: Positive for appetite change and fatigue. Negative for chills, diaphoresis and fever. HENT: Negative for congestion, dental problem, ear discharge, ear pain, facial swelling, mouth sores, postnasal drip, sinus pressure and sore throat. Eyes: Negative for discharge and redness. Respiratory: Positive for shortness of breath. Negative for cough, chest tightness and wheezing. Cardiovascular: Positive for palpitations and leg swelling. Negative for chest pain. Gastrointestinal: Negative for abdominal distention, abdominal pain, constipation, diarrhea and nausea. Endocrine: Negative for cold intolerance and heat intolerance. Genitourinary: Negative for difficulty urinating, dysuria, flank pain, pelvic pain and urgency. Musculoskeletal: Negative for arthralgias, back pain, gait problem and joint swelling. Skin: Negative.     Neurological: Negative for dizziness, seizures, comorbidity. Would recommend medical management. Pt is off cardizem drip, and we have not added any new meds- she has remained in SR with management of underlying problems. Lopressor continued    Pt's CT- neg for PE    Continue lasix IV today- switch to PO tomorrow    Hx of FF with LVEF of 65- from echo from 2/5/20- done during her last admission     as previously stated pt does have known aortic ectasia- Not aneurismal    Possible OAC if PAF ( but A-fib could have been brought on by respiratory and abdominal distention/ urinary retention)     repeat EKG this am        Electronically signedby CHRISTOPHER Herbert - CNP on 3/1/2020 at 7:11 AM    Attending Supervising Physician's R Meek [address]  I performed a history and physical examination on the patient and discussed the management with the nurse practicioner. I reviewed and agree with the findings and plan as documented in his note . History element: 80year old female with recent pneumonia, returns with flu, afib RVR and diastolic CHF. Rhythm resolved. Normal EF. Doing a little better today. Physical element:  NAD, RRR, CTA b, soft, no edema, nonfocal  Plan element: Switch to NOAC. Rate control. Treat pneumonia. Tim Jimenez MD [facility] Director of Cardiology Services and Cardiac Catheterization Laboratory  [facility]

## 2020-03-02 LAB
ANION GAP SERPL CALCULATED.3IONS-SCNC: 9 MEQ/L (ref 9–15)
BASOPHILS ABSOLUTE: 0 K/UL (ref 0–0.2)
BASOPHILS RELATIVE PERCENT: 0.1 %
BUN BLDV-MCNC: 28 MG/DL (ref 8–23)
CALCIUM SERPL-MCNC: 8.4 MG/DL (ref 8.5–9.9)
CHLORIDE BLD-SCNC: 90 MEQ/L (ref 95–107)
CO2: 41 MEQ/L (ref 20–31)
CREAT SERPL-MCNC: 0.37 MG/DL (ref 0.5–0.9)
EKG ATRIAL RATE: 250 BPM
EKG ATRIAL RATE: 320 BPM
EKG ATRIAL RATE: 63 BPM
EKG P AXIS: -41 DEGREES
EKG P-R INTERVAL: 100 MS
EKG Q-T INTERVAL: 258 MS
EKG Q-T INTERVAL: 300 MS
EKG Q-T INTERVAL: 342 MS
EKG QRS DURATION: 80 MS
EKG QRS DURATION: 84 MS
EKG QRS DURATION: 96 MS
EKG QTC CALCULATION (BAZETT): 349 MS
EKG QTC CALCULATION (BAZETT): 387 MS
EKG QTC CALCULATION (BAZETT): 420 MS
EKG R AXIS: 27 DEGREES
EKG R AXIS: 28 DEGREES
EKG R AXIS: 35 DEGREES
EKG T AXIS: 32 DEGREES
EKG T AXIS: 70 DEGREES
EKG T AXIS: 91 DEGREES
EKG VENTRICULAR RATE: 100 BPM
EKG VENTRICULAR RATE: 160 BPM
EKG VENTRICULAR RATE: 63 BPM
EOSINOPHILS ABSOLUTE: 0 K/UL (ref 0–0.7)
EOSINOPHILS RELATIVE PERCENT: 0.2 %
GFR AFRICAN AMERICAN: >60
GFR NON-AFRICAN AMERICAN: >60
GLUCOSE BLD-MCNC: 128 MG/DL (ref 70–99)
HCT VFR BLD CALC: 24.6 % (ref 37–47)
HEMOGLOBIN: 7.9 G/DL (ref 12–16)
LYMPHOCYTES ABSOLUTE: 0.6 K/UL (ref 1–4.8)
LYMPHOCYTES RELATIVE PERCENT: 12.3 %
MAGNESIUM: 1.9 MG/DL (ref 1.7–2.4)
MCH RBC QN AUTO: 32.8 PG (ref 27–31.3)
MCHC RBC AUTO-ENTMCNC: 32.1 % (ref 33–37)
MCV RBC AUTO: 102.3 FL (ref 82–100)
MONOCYTES ABSOLUTE: 0.7 K/UL (ref 0.2–0.8)
MONOCYTES RELATIVE PERCENT: 14.1 %
NEUTROPHILS ABSOLUTE: 3.5 K/UL (ref 1.4–6.5)
NEUTROPHILS RELATIVE PERCENT: 73.3 %
PDW BLD-RTO: 15.4 % (ref 11.5–14.5)
PLATELET # BLD: 142 K/UL (ref 130–400)
POTASSIUM REFLEX MAGNESIUM: 3.4 MEQ/L (ref 3.4–4.9)
RBC # BLD: 2.41 M/UL (ref 4.2–5.4)
SODIUM BLD-SCNC: 140 MEQ/L (ref 135–144)
WBC # BLD: 4.7 K/UL (ref 4.8–10.8)

## 2020-03-02 PROCEDURE — 2580000003 HC RX 258: Performed by: PHYSICIAN ASSISTANT

## 2020-03-02 PROCEDURE — 6360000002 HC RX W HCPCS: Performed by: INTERNAL MEDICINE

## 2020-03-02 PROCEDURE — 94761 N-INVAS EAR/PLS OXIMETRY MLT: CPT

## 2020-03-02 PROCEDURE — 94640 AIRWAY INHALATION TREATMENT: CPT

## 2020-03-02 PROCEDURE — 6370000000 HC RX 637 (ALT 250 FOR IP): Performed by: INTERNAL MEDICINE

## 2020-03-02 PROCEDURE — 2060000000 HC ICU INTERMEDIATE R&B

## 2020-03-02 PROCEDURE — 83735 ASSAY OF MAGNESIUM: CPT

## 2020-03-02 PROCEDURE — 94667 MNPJ CHEST WALL 1ST: CPT

## 2020-03-02 PROCEDURE — 99223 1ST HOSP IP/OBS HIGH 75: CPT | Performed by: INTERNAL MEDICINE

## 2020-03-02 PROCEDURE — 2500000003 HC RX 250 WO HCPCS: Performed by: PHYSICIAN ASSISTANT

## 2020-03-02 PROCEDURE — 6370000000 HC RX 637 (ALT 250 FOR IP): Performed by: PHYSICIAN ASSISTANT

## 2020-03-02 PROCEDURE — 93005 ELECTROCARDIOGRAM TRACING: CPT | Performed by: NURSE PRACTITIONER

## 2020-03-02 PROCEDURE — 94668 MNPJ CHEST WALL SBSQ: CPT

## 2020-03-02 PROCEDURE — 85025 COMPLETE CBC W/AUTO DIFF WBC: CPT

## 2020-03-02 PROCEDURE — APPNB30 APP NON BILLABLE TIME 0-30 MINS: Performed by: PHYSICIAN ASSISTANT

## 2020-03-02 PROCEDURE — 94150 VITAL CAPACITY TEST: CPT

## 2020-03-02 PROCEDURE — 36415 COLL VENOUS BLD VENIPUNCTURE: CPT

## 2020-03-02 PROCEDURE — 80048 BASIC METABOLIC PNL TOTAL CA: CPT

## 2020-03-02 PROCEDURE — 2700000000 HC OXYGEN THERAPY PER DAY

## 2020-03-02 RX ORDER — DILTIAZEM HYDROCHLORIDE 120 MG/1
120 CAPSULE, COATED, EXTENDED RELEASE ORAL 2 TIMES DAILY
Status: DISCONTINUED | OUTPATIENT
Start: 2020-03-02 | End: 2020-03-03 | Stop reason: HOSPADM

## 2020-03-02 RX ORDER — FUROSEMIDE 20 MG/1
20 TABLET ORAL DAILY
Status: DISCONTINUED | OUTPATIENT
Start: 2020-03-02 | End: 2020-03-03 | Stop reason: HOSPADM

## 2020-03-02 RX ORDER — POTASSIUM CHLORIDE 20 MEQ/1
40 TABLET, EXTENDED RELEASE ORAL ONCE
Status: COMPLETED | OUTPATIENT
Start: 2020-03-02 | End: 2020-03-02

## 2020-03-02 RX ORDER — LEVOFLOXACIN 500 MG/1
500 TABLET, FILM COATED ORAL DAILY
Status: DISCONTINUED | OUTPATIENT
Start: 2020-03-02 | End: 2020-03-03 | Stop reason: HOSPADM

## 2020-03-02 RX ORDER — POTASSIUM CHLORIDE 20 MEQ/1
20 TABLET, EXTENDED RELEASE ORAL
Status: DISCONTINUED | OUTPATIENT
Start: 2020-03-03 | End: 2020-03-03 | Stop reason: HOSPADM

## 2020-03-02 RX ADMIN — LEVOFLOXACIN 500 MG: 500 TABLET, FILM COATED ORAL at 13:31

## 2020-03-02 RX ADMIN — POTASSIUM CHLORIDE 40 MEQ: 20 TABLET, EXTENDED RELEASE ORAL at 13:31

## 2020-03-02 RX ADMIN — Medication 10 ML: at 09:46

## 2020-03-02 RX ADMIN — OSELTAMIVIR PHOSPHATE 75 MG: 75 CAPSULE ORAL at 09:45

## 2020-03-02 RX ADMIN — ASPIRIN 81 MG 81 MG: 81 TABLET ORAL at 09:44

## 2020-03-02 RX ADMIN — DILTIAZEM HYDROCHLORIDE 120 MG: 120 CAPSULE, COATED, EXTENDED RELEASE ORAL at 21:05

## 2020-03-02 RX ADMIN — APIXABAN 2.5 MG: 2.5 TABLET, FILM COATED ORAL at 09:45

## 2020-03-02 RX ADMIN — METOPROLOL TARTRATE 50 MG: 50 TABLET, FILM COATED ORAL at 21:06

## 2020-03-02 RX ADMIN — ACYCLOVIR 400 MG: 400 TABLET ORAL at 09:45

## 2020-03-02 RX ADMIN — ACETAMINOPHEN 650 MG: 325 TABLET ORAL at 05:33

## 2020-03-02 RX ADMIN — LEVALBUTEROL 1.25 MG: 1.25 SOLUTION, CONCENTRATE RESPIRATORY (INHALATION) at 07:16

## 2020-03-02 RX ADMIN — ACYCLOVIR 400 MG: 400 TABLET ORAL at 21:04

## 2020-03-02 RX ADMIN — METOPROLOL TARTRATE 5 MG: 5 INJECTION INTRAVENOUS at 04:34

## 2020-03-02 RX ADMIN — BUDESONIDE 500 MCG: 0.5 SUSPENSION RESPIRATORY (INHALATION) at 19:31

## 2020-03-02 RX ADMIN — FUROSEMIDE 20 MG: 10 INJECTION, SOLUTION INTRAVENOUS at 09:46

## 2020-03-02 RX ADMIN — Medication 10 ML: at 21:07

## 2020-03-02 RX ADMIN — DILTIAZEM HYDROCHLORIDE 120 MG: 120 CAPSULE, COATED, EXTENDED RELEASE ORAL at 09:44

## 2020-03-02 RX ADMIN — ACYCLOVIR 400 MG: 400 TABLET ORAL at 13:31

## 2020-03-02 RX ADMIN — ATORVASTATIN CALCIUM 20 MG: 20 TABLET, FILM COATED ORAL at 21:05

## 2020-03-02 RX ADMIN — PREDNISOLONE ACETATE 1 DROP: 10 SUSPENSION/ DROPS OPHTHALMIC at 13:32

## 2020-03-02 RX ADMIN — FUROSEMIDE 20 MG: 20 TABLET ORAL at 13:31

## 2020-03-02 RX ADMIN — OSELTAMIVIR PHOSPHATE 75 MG: 75 CAPSULE ORAL at 21:05

## 2020-03-02 RX ADMIN — LEVALBUTEROL 1.25 MG: 1.25 SOLUTION, CONCENTRATE RESPIRATORY (INHALATION) at 14:32

## 2020-03-02 RX ADMIN — BUDESONIDE 500 MCG: 0.5 SUSPENSION RESPIRATORY (INHALATION) at 07:16

## 2020-03-02 RX ADMIN — APIXABAN 2.5 MG: 2.5 TABLET, FILM COATED ORAL at 21:05

## 2020-03-02 RX ADMIN — POLYETHYLENE GLYCOL 3350 17 G: 17 POWDER, FOR SOLUTION ORAL at 04:34

## 2020-03-02 RX ADMIN — LEVALBUTEROL 1.25 MG: 1.25 SOLUTION, CONCENTRATE RESPIRATORY (INHALATION) at 19:31

## 2020-03-02 RX ADMIN — METHYLPREDNISOLONE SODIUM SUCCINATE 40 MG: 40 INJECTION, POWDER, FOR SOLUTION INTRAMUSCULAR; INTRAVENOUS at 09:45

## 2020-03-02 RX ADMIN — METOPROLOL TARTRATE 50 MG: 50 TABLET, FILM COATED ORAL at 09:45

## 2020-03-02 ASSESSMENT — PAIN SCALES - GENERAL
PAINLEVEL_OUTOF10: 0
PAINLEVEL_OUTOF10: 7

## 2020-03-02 ASSESSMENT — ENCOUNTER SYMPTOMS
SHORTNESS OF BREATH: 1
ABDOMINAL PAIN: 0
CHEST TIGHTNESS: 0
VOMITING: 0
NAUSEA: 0

## 2020-03-02 NOTE — FLOWSHEET NOTE
20:40 she is in bed and oxygen is at 4 L nasal cannula,pulse oximetry was checked in different fingers and the highest revealed 84% on 5 L of oxygen. A 50% aleman mask  Was applied ,pulse ox obtained a 95 to 97 % after a few minutes. 22;00 she is resting in bed and no respiratory distress    0000:patient is back on 4 L oxygen nasal cannula,96% pulse ox.    01:29 patient pulse ox on 4 liters of oxygen is 98%,will watch her closely    0315 patient is sleeping,her breathing is unlabored,supplemental oxygen maintained. 05:33 patient  Was medicated with tylenol 650 rated 7/10mg.  For complain of back pain,ten being the worst pain there is.    6:32 she is sleeping and no respiratory distress,pulse ox 96% and heart rate 114 ,atrial fibrillition

## 2020-03-02 NOTE — CARE COORDINATION
The patient is from [facility]. If the patient returns skilled to [facility], the patient will need a precert. Per Leila Russell, the patient's family was to meet today with [facility] hospice. Per the , the patient's family thought they were meeting with [address] [place] [address] today at 1 pm.  The LSW called and talked to [facility], Palliative Care. [facility], to check and see if a meeting is scheduled today with the patient's family. The  messaged Leila Russell.  Electronically signed by JOSEFINA Dorado on 3/2/20 at 10:22 AM

## 2020-03-02 NOTE — PROGRESS NOTES
Progress Note  Patient: Radha Chavez  Unit/Bed: W771/T766-92  YOB: 1934  MRN: [id number]  Acct: [de-identified]   Admitting Diagnosis: Atrial fibrillation with RVR [facility]) [I48.91]  Date:  2/28/2020  Hospital Day: 3    Chief Complaint:  SOB    Subjective    3/2/20: Resting comfortably in bed in no acute distress. Complains of persistent shortness of breath and states that she is breathing terribly. Maintaining adequate SPO2 on 5 L O2 per nasal cannula at this time. Serial troponins mildly elevated x3 at 0.024, 0.020 and 0.014. On Tamiflu for influenza B. Receiving steroids in nebulizer treatments. Recent blood pressure stable at 125/89. Currently on telemetry she is atrial fibrillation with heart rates 90s to 120s. She was noted on telemetry alarms to be RVR with heart rates 150s to 160s earlier this morning around 10 AM.  She is on low-dose oral anticoagulation with Eliquis 2.5 mg p.o. twice daily. A.m. labs reviewed. Patient with worsening anemia with hemoglobin down to 7.9 from 10.0 on admission on 2/29/2020. He has diuresed well on IV Lasix with proximately 2100 mL negative fluid balance since admission. Will be transition to p.o. Lasix starting tomorrow. 3/1/20: Pt much improved in abdominal distention with chandler insertion   she also converted to SR right after bladder decompression and she has not gone back to A-fib on tele since. She did test positive for influenza- and she has been tx recently for pneumonia and weakness. She states she feels much better    2/29/20: Pt is a 80yr old female brought by EMS form nursing home with complaints of SOB. Pt just had a recent hospitalization    for COPD exacerbation but found to have sinus tachycardia and CT chest found ascending aortic ectasia.  NO apparent chest pain then. Pt does not have Chest pain at this time- but does have SOB with and without exacerbation      Patient very poor historian. No complaints at this time.     Review of Systems:   Review of Systems   Constitutional: Negative for activity change, chills and fever. HENT: Negative for congestion. Respiratory: Positive for shortness of breath. Negative for chest tightness. Cardiovascular: Negative for chest pain, palpitations and leg swelling. Gastrointestinal: Negative for abdominal pain, nausea and vomiting. Genitourinary: York catheter in place draining clear, yellow urine   Skin: Positive for pallor. Multiple areas of ecchymosis on UE   Neurological: Negative for dizziness and syncope. Psychiatric/Behavioral: Negative for agitation and behavioral problems. Physical Examination:    /89   Pulse 120   Temp 97.7 °F (36.5 °C) (Oral)   Resp 20   Ht 5' 5\" (1.651 m) Comment: per EMR  Wt 180 lb 4.8 oz (81.8 kg)   SpO2 97%   BMI 30.00 kg/m²    Physical Exam  Constitutional:       General: She is not in acute distress. Appearance: Normal appearance. She is obese. HENT:      Head: Normocephalic and atraumatic. Cardiovascular:      Rate and Rhythm: Tachycardia present. Rhythm irregularly irregular. Pulmonary:      Effort: Pulmonary effort is normal. No respiratory distress. Breath sounds: No wheezing, rhonchi or rales. Comments: Poor inspiratory effort; diffusely diminished breath sounds  Abdominal:      Palpations: Abdomen is soft. Tenderness: There is no abdominal tenderness. Musculoskeletal: Normal range of motion. General: No swelling (no LE pitting edema bilaterally). Skin:     General: Skin is warm and dry. Coloration: Skin is pale. Neurological:      General: No focal deficit present. Mental Status: She is alert. Cranial Nerves: No cranial nerve deficit.    Psychiatric:         Mood and Affect: Mood normal.         Behavior: Behavior normal.         LABS:  CBC:   Lab Results   Component Value Date    WBC 4.7 03/02/2020    RBC 2.41 03/02/2020    HGB 7.9 03/02/2020    HCT 24.6 03/02/2020 MIP 3D reconstructions were performed  on a separate workstation. FINDINGS:  No intraluminal filling defects, pulmonary arterial tree. Cardiac size normal. No pericardial effusion. Reflux of contrast medium into hepatic veins. Thoracic aorta normal in course and caliber Right lung shows emphysematous change. Tree-in-bud changes, dependent portion right middle lobe. Dependent posterior right lower lobe subsegmental atelectatic change. Motion artifact. No nodules and no masses. No pleural effusion. Left lung shows emphysema, and motion artifact. Dependent subsegmental atelectatic change left upper lobe, and base left lower lobe. Mild bronchial wall thickening identified left lung base. . No hilar, mediastinal, or axillary lymph node enlargement. Limited imaging upper abdomen shows cyst within upper pole, left kidney. No CT evidence pulmonary embolism. Reflux contrast medium into hepatic veins, suggestive of right heart failure. Emphysema. Bibasilar, and right upper lobe dependent subsegmental atelectatic change. Left lower lobe bronchiectasis. Other findings discussed. All CT scans at this facility use dose modulation, iterative reconstruction, and/or weight based dosing when appropriate to reduce radiation dose to as low as reasonably achievable. Echocardiogram 2/5/20:  Conclusions   Summary   Normal left ventricular systolic function, no regional wall motion   abnormalities, estimated ejection fraction of 65%. Normal left ventricular size and function. Normal left ventricular wall thickness. Impaired relaxation compatible with diastolic dysfunction. ( reversed E/A   ratio)   Mild (1+) mitral regurgitation is present. No evidence of mitral valve stenosis. No evidence of aortic valve regurgitation . No evidence of aortic valve stenosis. There is Trace tricuspid regurgitation with estimated RVSP of 61 mm Hg. The left atrium is Mildly dilated.    Right ventricular systolic pressure of 61 mm Hg

## 2020-03-02 NOTE — PROGRESS NOTES
Hospitalist Progress Note      PCP: Lul Thacker MD    Date of Admission: 2/28/2020    Chief Complaint:  Still in AFIB with RVR, HR in low 100s, afebrile, feels short of breath    Medications:  Reviewed    Infusion Medications   Scheduled Medications    furosemide  20 mg Intravenous Daily    apixaban  2.5 mg Oral BID    methylPREDNISolone  40 mg Intravenous Daily    budesonide  0.5 mg Nebulization BID    levalbuterol  1.25 mg Nebulization TID    oseltamivir  75 mg Oral BID    dilTIAZem  120 mg Oral Daily    sodium chloride flush  10 mL Intravenous 2 times per day    aspirin  81 mg Oral Daily    acyclovir  400 mg Oral TID    atorvastatin  20 mg Oral Nightly    prednisoLONE acetate  1 drop Right Eye Daily    metoprolol tartrate  50 mg Oral BID     PRN Meds: levalbuterol, sodium chloride flush, acetaminophen **OR** acetaminophen, polyethylene glycol, promethazine **OR** ondansetron, metoprolol      Intake/Output Summary (Last 24 hours) at 3/2/2020 1121  Last data filed at 3/2/2020 0446  Gross per 24 hour   Intake 1110 ml   Output 1900 ml   Net -790 ml       Exam:    BP (!) 94/53   Pulse 136   Temp 97.7 °F (36.5 °C) (Oral)   Resp 20   Ht 5' 5\" (1.651 m) Comment: per EMR  Wt 180 lb 4.8 oz (81.8 kg)   SpO2 97%   BMI 30.00 kg/m²     General appearance: awake, alert, cooperative. Respiratory: diminished BS bilaterally . Cardiovascular: irregular rate and rhythm, tachycardic. Abdomen: Soft, active bowel sounds. Musculoskeletal: No edema bilaterally.       Labs:   Recent Labs     02/29/20  0558 03/01/20  0622 03/02/20  0557   WBC 6.3 5.5 4.7*   HGB 10.0* 8.9* 7.9*   HCT 31.7* 27.3* 24.6*   * 125* 142     Recent Labs     02/29/20  0557 03/01/20  0622 03/02/20  0557    143 140   K 3.7 4.0 3.4   CL 98 93* 90*   CO2 37* 40* 41*   BUN 18 32* 28*   CREATININE 0.45* 0.51 0.37*   CALCIUM 8.5 8.3* 8.4*     Recent Labs     02/28/20  1854   AST 22   ALT 19   BILITOT <0.2   ALKPHOS 82     No results for input(s): INR in the last 72 hours. Recent Labs     02/28/20  1854 02/29/20  0001 02/29/20  0558   TROPONINI 0.024* 0.020* 0.014*       Urinalysis:      Lab Results   Component Value Date    NITRU Negative 02/29/2020    WBCUA 6-10 02/18/2020    BACTERIA Negative 02/18/2020    RBCUA 3-5 02/05/2020    BLOODU Negative 02/29/2020    SPECGRAV 1.012 02/29/2020    GLUCOSEU Negative 02/29/2020       Radiology:  CTA CHEST W WO CONTRAST   Final Result      No CT evidence pulmonary embolism. Reflux contrast medium into hepatic veins, suggestive of right heart failure. Emphysema. Bibasilar, and right upper lobe dependent subsegmental atelectatic change. Left lower lobe bronchiectasis. Other findings discussed. All CT scans at this facility use dose modulation, iterative reconstruction, and/or weight based dosing when appropriate to reduce radiation dose to as low as reasonably achievable. XR CHEST PORTABLE   Final Result   NO INTERVAL CHANGE. RIGHT LOWER AND LEFT MID LUNG ATELECTASIS/PNEUMONIA. XR CHEST PORTABLE   Final Result   BILATERAL SUBSEGMENTAL ATELECTASIS/PNEUMONIA. FINDINGS ESSENTIALLY UNCHANGED, GIVEN DIFFERENCES IN RADIOGRAPHIC TECHNIQUE.               Assessment/Plan:    51-year-old female with history of chronic diastolic heart failure, COPD, hypertension was sent from the nursing home with complaints of shortness of breath.       Paroxysmal A.fib with RVR  - initially on Lovenox, switched to apixaban  - off cardizem drip, on PO cardizem and Lopressor  - cardiology following    Acute on chronic diastolic heart failure  - due to atrial fibrillation with RVR  - on IV Lasix      Acute bronchitis  - due to influenza B  - on Tamiflu     Acute exacerbation of COPD  - due to acute bronchitis,due to influenza B  - on Tamiflu, Solu-Medrol, Xopenex  - pulmonology consulted    Anemia   - monitor    Leukopenia  - monitor    Diet: Dietary Nutrition Supplements: Standard High Calorie Oral Supplement  Dietary Nutrition Supplements: Frozen Oral Supplement  Dietary Nutrition Supplements: Other Oral Supplement (see comment)  DIET LOW SODIUM 2 GM;      Disposition - home with Hospice tomorrow        Electronically signed by Jacqueline Deutsch MD on 3/2/2020 at 11:21 AM

## 2020-03-02 NOTE — PROGRESS NOTES
This patient has both a [address] document and a Living Will Declaration. A copy of both documents can be located in the patient's chart. The patient named her sister, Nahomi Cannon, as her primary agent.

## 2020-03-03 VITALS
OXYGEN SATURATION: 91 % | WEIGHT: 183 LBS | SYSTOLIC BLOOD PRESSURE: 123 MMHG | DIASTOLIC BLOOD PRESSURE: 73 MMHG | HEART RATE: 93 BPM | TEMPERATURE: 98.1 F | BODY MASS INDEX: 30.49 KG/M2 | RESPIRATION RATE: 18 BRPM | HEIGHT: 65 IN

## 2020-03-03 LAB
ANION GAP SERPL CALCULATED.3IONS-SCNC: 12 MEQ/L (ref 9–15)
ANISOCYTOSIS: ABNORMAL
BANDED NEUTROPHILS RELATIVE PERCENT: 2 % (ref 5–11)
BASOPHILS ABSOLUTE: 0 K/UL (ref 0–0.2)
BASOPHILS RELATIVE PERCENT: 0 %
BUN BLDV-MCNC: 25 MG/DL (ref 8–23)
CALCIUM SERPL-MCNC: 8.8 MG/DL (ref 8.5–9.9)
CHLORIDE BLD-SCNC: 94 MEQ/L (ref 95–107)
CO2: 39 MEQ/L (ref 20–31)
CREAT SERPL-MCNC: 0.44 MG/DL (ref 0.5–0.9)
EOSINOPHILS ABSOLUTE: 0 K/UL (ref 0–0.7)
EOSINOPHILS RELATIVE PERCENT: 0 %
GFR AFRICAN AMERICAN: >60
GFR NON-AFRICAN AMERICAN: >60
GLUCOSE BLD-MCNC: 141 MG/DL (ref 70–99)
HCT VFR BLD CALC: 23.3 % (ref 37–47)
HEMOGLOBIN: 7.3 G/DL (ref 12–16)
LYMPHOCYTES ABSOLUTE: 1 K/UL (ref 1–4.8)
LYMPHOCYTES RELATIVE PERCENT: 16 %
MCH RBC QN AUTO: 32.1 PG (ref 27–31.3)
MCHC RBC AUTO-ENTMCNC: 31.5 % (ref 33–37)
MCV RBC AUTO: 101.8 FL (ref 82–100)
MONOCYTES ABSOLUTE: 0.3 K/UL (ref 0.2–0.8)
MONOCYTES RELATIVE PERCENT: 4 %
MYELOCYTE PERCENT: 2 %
NEUTROPHILS ABSOLUTE: 5 K/UL (ref 1.4–6.5)
NEUTROPHILS RELATIVE PERCENT: 76 %
OVALOCYTES: ABNORMAL
PDW BLD-RTO: 16 % (ref 11.5–14.5)
PLATELET # BLD: 178 K/UL (ref 130–400)
PLATELET SLIDE REVIEW: ADEQUATE
POIKILOCYTES: ABNORMAL
POTASSIUM REFLEX MAGNESIUM: 4 MEQ/L (ref 3.4–4.9)
RBC # BLD: 2.28 M/UL (ref 4.2–5.4)
SODIUM BLD-SCNC: 145 MEQ/L (ref 135–144)
WBC # BLD: 6.3 K/UL (ref 4.8–10.8)

## 2020-03-03 PROCEDURE — 6370000000 HC RX 637 (ALT 250 FOR IP): Performed by: INTERNAL MEDICINE

## 2020-03-03 PROCEDURE — 6370000000 HC RX 637 (ALT 250 FOR IP): Performed by: PHYSICIAN ASSISTANT

## 2020-03-03 PROCEDURE — 94640 AIRWAY INHALATION TREATMENT: CPT

## 2020-03-03 PROCEDURE — 94664 DEMO&/EVAL PT USE INHALER: CPT

## 2020-03-03 PROCEDURE — 2580000003 HC RX 258: Performed by: PHYSICIAN ASSISTANT

## 2020-03-03 PROCEDURE — 6370000000 HC RX 637 (ALT 250 FOR IP)

## 2020-03-03 PROCEDURE — 85025 COMPLETE CBC W/AUTO DIFF WBC: CPT

## 2020-03-03 PROCEDURE — 99232 SBSQ HOSP IP/OBS MODERATE 35: CPT | Performed by: INTERNAL MEDICINE

## 2020-03-03 PROCEDURE — 80048 BASIC METABOLIC PNL TOTAL CA: CPT

## 2020-03-03 PROCEDURE — 94761 N-INVAS EAR/PLS OXIMETRY MLT: CPT

## 2020-03-03 PROCEDURE — 6360000002 HC RX W HCPCS: Performed by: INTERNAL MEDICINE

## 2020-03-03 PROCEDURE — 2700000000 HC OXYGEN THERAPY PER DAY

## 2020-03-03 PROCEDURE — 51702 INSERT TEMP BLADDER CATH: CPT

## 2020-03-03 PROCEDURE — 36415 COLL VENOUS BLD VENIPUNCTURE: CPT

## 2020-03-03 RX ORDER — METOPROLOL TARTRATE 50 MG/1
50 TABLET, FILM COATED ORAL ONCE
Status: DISCONTINUED | OUTPATIENT
Start: 2020-03-03 | End: 2020-03-03 | Stop reason: HOSPADM

## 2020-03-03 RX ORDER — SODIUM CHLORIDE FOR INHALATION 0.9 %
VIAL, NEBULIZER (ML) INHALATION
Status: COMPLETED
Start: 2020-03-03 | End: 2020-03-03

## 2020-03-03 RX ORDER — PREDNISONE 10 MG/1
10 TABLET ORAL DAILY
Status: DISCONTINUED | OUTPATIENT
Start: 2020-03-10 | End: 2020-03-03 | Stop reason: HOSPADM

## 2020-03-03 RX ORDER — PREDNISONE 10 MG/1
30 TABLET ORAL DAILY
Status: DISCONTINUED | OUTPATIENT
Start: 2020-03-04 | End: 2020-03-03 | Stop reason: HOSPADM

## 2020-03-03 RX ORDER — LEVOFLOXACIN 500 MG/1
500 TABLET, FILM COATED ORAL DAILY
Qty: 3 TABLET | Refills: 0 | Status: SHIPPED | OUTPATIENT
Start: 2020-03-04 | End: 2020-03-07

## 2020-03-03 RX ORDER — PREDNISONE 10 MG/1
30 TABLET ORAL DAILY
Qty: 6 TABLET | Refills: 0 | Status: SHIPPED | OUTPATIENT
Start: 2020-03-04 | End: 2020-03-06

## 2020-03-03 RX ORDER — METOPROLOL TARTRATE 100 MG/1
100 TABLET ORAL 2 TIMES DAILY
Qty: 60 TABLET | Refills: 3 | Status: SHIPPED | OUTPATIENT
Start: 2020-03-03

## 2020-03-03 RX ORDER — METOPROLOL TARTRATE 50 MG/1
100 TABLET, FILM COATED ORAL 2 TIMES DAILY
Status: DISCONTINUED | OUTPATIENT
Start: 2020-03-03 | End: 2020-03-03 | Stop reason: HOSPADM

## 2020-03-03 RX ORDER — OSELTAMIVIR PHOSPHATE 75 MG/1
75 CAPSULE ORAL 2 TIMES DAILY
Qty: 2 CAPSULE | Refills: 0 | Status: SHIPPED | OUTPATIENT
Start: 2020-03-03 | End: 2020-03-04

## 2020-03-03 RX ORDER — PREDNISONE 10 MG/1
20 TABLET ORAL DAILY
Status: DISCONTINUED | OUTPATIENT
Start: 2020-03-07 | End: 2020-03-03 | Stop reason: HOSPADM

## 2020-03-03 RX ADMIN — ISODIUM CHLORIDE 3 ML: 0.03 SOLUTION RESPIRATORY (INHALATION) at 02:47

## 2020-03-03 RX ADMIN — LEVALBUTEROL 1.25 MG: 1.25 SOLUTION, CONCENTRATE RESPIRATORY (INHALATION) at 07:16

## 2020-03-03 RX ADMIN — BUDESONIDE 500 MCG: 0.5 SUSPENSION RESPIRATORY (INHALATION) at 07:16

## 2020-03-03 RX ADMIN — Medication 10 ML: at 08:09

## 2020-03-03 RX ADMIN — ACYCLOVIR 400 MG: 400 TABLET ORAL at 08:04

## 2020-03-03 RX ADMIN — PREDNISOLONE ACETATE 1 DROP: 10 SUSPENSION/ DROPS OPHTHALMIC at 08:06

## 2020-03-03 RX ADMIN — METOPROLOL TARTRATE 50 MG: 50 TABLET, FILM COATED ORAL at 08:05

## 2020-03-03 RX ADMIN — METHYLPREDNISOLONE SODIUM SUCCINATE 40 MG: 40 INJECTION, POWDER, FOR SOLUTION INTRAMUSCULAR; INTRAVENOUS at 08:09

## 2020-03-03 RX ADMIN — POTASSIUM CHLORIDE 20 MEQ: 20 TABLET, EXTENDED RELEASE ORAL at 08:05

## 2020-03-03 RX ADMIN — ACYCLOVIR 400 MG: 400 TABLET ORAL at 14:35

## 2020-03-03 RX ADMIN — FUROSEMIDE 20 MG: 20 TABLET ORAL at 08:05

## 2020-03-03 RX ADMIN — LEVALBUTEROL 1.25 MG: 1.25 SOLUTION, CONCENTRATE RESPIRATORY (INHALATION) at 02:47

## 2020-03-03 RX ADMIN — ASPIRIN 81 MG 81 MG: 81 TABLET ORAL at 08:05

## 2020-03-03 RX ADMIN — LEVALBUTEROL 1.25 MG: 1.25 SOLUTION, CONCENTRATE RESPIRATORY (INHALATION) at 13:08

## 2020-03-03 RX ADMIN — OSELTAMIVIR PHOSPHATE 75 MG: 75 CAPSULE ORAL at 08:04

## 2020-03-03 RX ADMIN — LEVOFLOXACIN 500 MG: 500 TABLET, FILM COATED ORAL at 08:06

## 2020-03-03 RX ADMIN — DILTIAZEM HYDROCHLORIDE 120 MG: 120 CAPSULE, COATED, EXTENDED RELEASE ORAL at 08:04

## 2020-03-03 ASSESSMENT — PAIN SCALES - GENERAL: PAINLEVEL_OUTOF10: 0

## 2020-03-03 NOTE — PROGRESS NOTES
Progress Note  Patient: Diamond Banuelos  Unit/Bed: E875/U118-71  YOB: 1934  MRN: [id number]  Acct: [de-identified]   Admitting Diagnosis: Atrial fibrillation with RVR [facility]) [I48.91]  Admit Date:  2/28/2020  Hospital Day: 4    Chief Complaint: dyspnea     Subjective/HPI: 80year old female brought by EMS from nursing home with complaints of SOB. Pt just had a recent hospitalization for COPD exacerbation but found to have sinus tachycardia and CT chest found ascending aortic ectasia.  No apparent chest pain then. Pt does not have chest pain at this time- but does have SOB with and without exacerbation . Found to be positive new diagnosis of influenza. Today heart rate is somewhat elevated. Overall the patient is feeling much better.         EKG:  Review of Systems:   Review of Systems      Physical Examination:    /74   Pulse 129   Temp 98 °F (36.7 °C)   Resp 18   Ht 5' 5\" (1.651 m) Comment: per EMR  Wt 183 lb (83 kg)   SpO2 94%   BMI 30.45 kg/m²    Physical Exam    LABS:  CBC:   Lab Results   Component Value Date    WBC 6.3 03/03/2020    RBC 2.28 03/03/2020    HGB 7.3 03/03/2020    HCT 23.3 03/03/2020    .8 03/03/2020    MCH 32.1 03/03/2020    MCHC 31.5 03/03/2020    RDW 16.0 03/03/2020     03/03/2020     CBC with Differential:    Lab Results   Component Value Date    WBC 6.3 03/03/2020    RBC 2.28 03/03/2020    HGB 7.3 03/03/2020    HCT 23.3 03/03/2020     03/03/2020    .8 03/03/2020    MCH 32.1 03/03/2020    MCHC 31.5 03/03/2020    RDW 16.0 03/03/2020    LYMPHOPCT 12.3 03/02/2020    MONOPCT 14.1 03/02/2020    BASOPCT 0.1 03/02/2020    MONOSABS 0.7 03/02/2020    LYMPHSABS 0.6 03/02/2020    EOSABS 0.0 03/02/2020    BASOSABS 0.0 03/02/2020     CMP:    Lab Results   Component Value Date     03/03/2020    K 4.0 03/03/2020    CL 94 03/03/2020    CO2 39 03/03/2020    BUN 25 03/03/2020    CREATININE 0.44 03/03/2020    GFRAA >60.0 03/03/2020    LABGLOM >60.0

## 2020-03-03 NOTE — PROGRESS NOTES
lasix     Acute bronchitis  - due to influenza B  - on Tamiflu     Acute exacerbation of COPD  - due to acute bronchitis,due to influenza B  - treated with Tamiflu, Levaquin, Solu-Medrol, Xopenex  - OK to d/c home on prednisone taper, tamiflu and Levaquin per pulmonology     Anemia   - Hb trending down, no evidence of active bleeding  - stopped Apixaban     Leukopenia  - monitor    Diet: Dietary Nutrition Supplements: Standard High Calorie Oral Supplement  Dietary Nutrition Supplements: Frozen Oral Supplement  Dietary Nutrition Supplements: Other Oral Supplement (see comment)  DIET LOW SODIUM 2 GM; 2000 ml      Disposition - home with Hospice today        Electronically signed by Sivakumar Starks MD on 3/3/2020 at 10:59 AM

## 2020-03-03 NOTE — FLOWSHEET NOTE
Patient's discharge instructions reviewed with patient's sister per telephone. Equipment at patient's house and hospice to visit after patient arrives home.

## 2020-03-03 NOTE — FLOWSHEET NOTE
Dr. Funmi Albright saw patient. Discussed with patient the plan to go home with hospice. Patient agreeable to plan.

## 2020-03-03 NOTE — DISCHARGE SUMMARY
were performed  on a separate workstation. FINDINGS:  No intraluminal filling defects, pulmonary arterial tree. Cardiac size normal. No pericardial effusion. Reflux of contrast medium into hepatic veins. Thoracic aorta normal in course and caliber Right lung shows emphysematous change. Tree-in-bud changes, dependent portion right middle lobe. Dependent posterior right lower lobe subsegmental atelectatic change. Motion artifact. No nodules and no masses. No pleural effusion. Left lung shows emphysema, and motion artifact. Dependent subsegmental atelectatic change left upper lobe, and base left lower lobe. Mild bronchial wall thickening identified left lung base. . No hilar, mediastinal, or axillary lymph node enlargement. Limited imaging upper abdomen shows cyst within upper pole, left kidney. No CT evidence pulmonary embolism. Reflux contrast medium into hepatic veins, suggestive of right heart failure. Emphysema. Bibasilar, and right upper lobe dependent subsegmental atelectatic change. Left lower lobe bronchiectasis. Other findings discussed. All CT scans at this facility use dose modulation, iterative reconstruction, and/or weight based dosing when appropriate to reduce radiation dose to as low as reasonably achievable. Xr Chest Portable    Result Date: 2/29/2020  EXAMINATION: XR CHEST PORTABLE CLINICAL HISTORY: SHORTNESS OF BREATH COMPARISONS: CHEST RADIOGRAPHS, FEBRUARY 20, 2020, FEBRUARY 19, 2020. CT CHEST, FEBRUARY 18, 2020 FINDINGS: Patient rotated to left. Osseous structures intact. Cardiopericardial silhouette is normal. Aorta calcified. Ill-defined area of increased opacity right and left lung bases, as well as left midlung. BILATERAL SUBSEGMENTAL ATELECTASIS/PNEUMONIA. FINDINGS ESSENTIALLY UNCHANGED, GIVEN DIFFERENCES IN RADIOGRAPHIC TECHNIQUE.     Xr Chest Portable    Result Date: 2/29/2020  EXAMINATION: XR CHEST PORTABLE CLINICAL HISTORY: SHORTNESS OF BREATH COMPARISONS: FEBRUARY 28, 2020 FINDINGS: Osseous structures intact. Cardiopericardial silhouette normal. Aorta calcified. Ill-defined areas increase opacity are found in the right lower and left midlung, unchanged from prior study. NO INTERVAL CHANGE. RIGHT LOWER AND LEFT MID LUNG ATELECTASIS/PNEUMONIA.       Discharge Medications:       Katie Marmolejo   Home Medication Instructions [facility]:[id number]    Printed on:03/03/20 6881   Medication Information                      albuterol (PROVENTIL) (2.5 MG/3ML) 0.083% nebulizer solution  Take 2.5 mg by nebulization every 4 hours as needed for Wheezing              aspirin 81 MG EC tablet  Take 1 tablet by mouth daily             atorvastatin (LIPITOR) 20 MG tablet  Take 1 tablet by mouth nightly             budesonide-formoterol (SYMBICORT) 160-4.5 MCG/ACT AERO  Inhale 2 puffs into the lungs 2 times daily             dilTIAZem (CARDIZEM 12 HR) 120 MG extended release capsule  Take 120 mg by mouth 2 times daily             furosemide (LASIX) 20 MG tablet  Take 20 mg by mouth daily Give 40mg daily if edema lasts greater than 5 days             ipratropium-albuterol (DUONEB) 0.5-2.5 (3) MG/3ML SOLN nebulizer solution  Inhale 1 vial into the lungs every 6 hours as needed for Shortness of Breath             levoFLOXacin (LEVAQUIN) 500 MG tablet  Take 1 tablet by mouth daily for 3 days             magnesium hydroxide (MILK OF MAGNESIA) 400 MG/5ML suspension  Take by mouth daily as needed for Constipation             metoprolol tartrate (LOPRESSOR) 100 MG tablet  Take 1 tablet by mouth 2 times daily             oseltamivir (TAMIFLU) 75 MG capsule  Take 1 capsule by mouth 2 times daily for 1 day             POTASSIUM CHLORIDE ER PO  Take 10 mEq by mouth daily Give 20meq daily if edema lasts greater than 5 days             prednisoLONE acetate (PRED FORTE) 1 % ophthalmic suspension  Place 1 drop into the right eye daily             predniSONE (DELTASONE) 10 MG tablet  Take 10 mg by mouth daily As ordered retail pharmacy and your PCP, Yg Land MD .     ** I STRONGLY RECOMMEND that you follow up with Yg Land MD within 3 to 5 days for a post hospitalization evaluation. This specific office visit is covered by your insurance, and is not the same as your annual doctor visit/ check up. This office visit is important, as it may prevent need for repeat and/or future hospitalizations. **    Your medical team at [facility] ([facility]) appreciates the opportunity to work with you to get well!     Sincerely,  Candelario Gardner

## 2020-03-03 NOTE — PROGRESS NOTES
INPATIENT PROGRESS NOTES    PATIENT NAME: Duong Pineda  MRN: [id number]  SERVICE DATE:  March 3, 2020   SERVICE TIME:  9:09 AM      PRIMARY SERVICE: Pulmonary Disease    CHIEF COMPLAINTS:  cough    INTERVAL HPI: Patient seen and examined at bedside, Interval Notes, orders reviewed. Nursing notes noted    Patient report feels better, has mild dry cough, shortness of breath mainly exertional and improved, no chest pain, she would like to go home, no fever, no nausea no vomiting    Review of system:     GI Abdominal pain No  Skin Rash No    Social History     Tobacco Use    Smoking status: Former Smoker    Smokeless tobacco: Never Used   Substance Use Topics    Alcohol use: No         Family history unknown: Yes         OBJECTIVE    Body mass index is 30.45 kg/m². PHYSICAL EXAM:  Vitals:  BP (!) 105/52   Pulse 124   Temp 98.1 °F (36.7 °C)   Resp 18   Ht 5' 5\" (1.651 m) Comment: per EMR  Wt 183 lb (83 kg)   SpO2 (!) 89%   BMI 30.45 kg/m²     General: alert, cooperative, no distress  Head: normocephalic, atraumatic  Eyes:No gross abnormalities. ENT:  MMM no lesions  Neck:  supple and no masses  Chest : Diminished breath sounds, otherwise no wheezing, minimal rales at the bases, nontender, tympanic  Heart[de-identified] Heart sounds are normal.  Regular rate and rhythm without murmur, gallop or rub. ABD:  symmetric, soft, non-tender  Musculoskeletal : no cyanosis, no clubbing and no edema  Neuro:  Grossly normal  Skin: No rashes or nodules noted.   Lymph node:  no cervical nodes  Urology: No [place]   Psychiatric: appropriate    DATA:   Recent Labs     03/02/20  0557 03/03/20  0542   WBC 4.7* 6.3   HGB 7.9* 7.3*   HCT 24.6* 23.3*   .3* 101.8*    178     Recent Labs     03/02/20  0557 03/03/20  0542    145*   K 3.4 4.0   CL 90* 94*   CO2 41* 39*   BUN 28* 25*   CREATININE 0.37* 0.44*   GLUCOSE 128* 141*   CALCIUM 8.4* 8.8   LABGLOM >60.0 >60.0   GFRAA >60.0 >60.0       MV Settings:          No results infarct. All CT scans at this facility use dose modulation, iterative reconstruction, and/or weight based dosing when appropriate to reduce radiation dose to as low as reasonably achievable. Ct Head Wo Contrast    Result Date: 2/4/2020  EXAMINATION: CT HEAD WO CONTRAST  DATE AND TIME:2/4/2020 11:30 AM CLINICAL HISTORY: Severe headache.  fall hit head  COMPARISON: None TECHNIQUE:Axial CT from skull base to vertex without  contrast..  All CT scans at this facility use dose modulation, iterative reconstruction, and/or weight based dosing when appropriate to reduce radiation dose to as low as reasonably achievable. FINDINGS CSF spaces: Ventricles are normal in size and position. Sulci are appropriate for age. Brain parenchyma: There is no parenchymal mass, or mass effect signs of acute infarct, acute hemorrhage or extra-axial fluid. There are a few scattered hyperintensity in the periventricular white matter that can be related to microvascular disease. This  can also be seen as part of normal aging. Hypodense area in the left subinsular area likely the left temporal tip versus small remote ischemic focus. Skull base: The bony calvarium and skull base are normal.   The visualized paranasal sinuses and mastoids are clear. Age-related changes. Nothing acute intracranially. Ct Chest W Contrast    Result Date: 2/19/2020  CT of the Chest with intravenous contrast medium History: Altered mental status. Possible fall. Technical Factors: CT imaging of the chest was obtained and formatted as 5 mm contiguous axial images from the thoracic inlet through the adrenal glands. Sagittal and coronal reconstruction obtained during postprocessing. Intravenous contrast medium:  Isovue-370, 100 mL Comparison:  CTA chest of February 4, 2020, chest radiograph, same date. Findings: Right lung shows diffuse emphysematous change. Scarring anterior right upper lobe.  Dependent atelectatic change, right upper lobe achievable. Ct Cervical Spine Wo Contrast    Result Date: 2/19/2020  CT CERVICAL SPINE WO CONTRAST CLINICAL HISTORY: Generalized pain after fall COMPARISON: NONE Findings: Multiple serial axial images of the cervical spine from the base of the skull through the upper thoracic vertebra with both sagittal and coronal reconstructions was performed. There is diffuse generalized osteopenia There is straightening of the normal expected cervical lordosis. There is multilevel degenerative joint disease. Prevertebral  soft tissues are  unremarkable. The disk spaces are narrowed at the C3-4, 4-5, C5-6, C6-7,..  No acute fractures or spondylo-listhesis. There are nodules seen in both left and right lobes of thyroid. The largest on the left measures 1.3 cm. There is an old fracture dislocation of the neck of the right clavicle. There is mild to moderate emphysematous changes in the visualized upper lung parenchyma as well as biapical areas of scarring fibrosis right greater than left . NO ACUTE FRACTURES. MULTINODULAR THYROID. CORRELATE CLINICALLY. FOLLOW-UP. OTHER FINDINGS DETAILED ABOVE All CT scans at this facility use dose modulation, iterative reconstruction, and/or weight based dosing when appropriate to reduce radiation dose to as low as reasonably achievable. Ct Abdomen Pelvis W Iv Contrast Additional Contrast? None    Result Date: 2/19/2020  Examination: CT ABDOMEN PELVIS W IV CONTRAST Indication:  Change in mental status. History of multiple falls. Technique: Multiple serial axial images was performed through the abdomen and pelvis utilizing 100cc of Optiray 370. Images were reconstructed in the axial and coronal and sagittal planes. Comparison:  No comparison is available. Findings: The liver shows an area of low-attenuation within the lateral aspect of the right lobe. Too small to characterize. No other focal parenchymal abnormalities. No intrahepatic biliary dilatation.  The, spleen, pancreas, adrenals, are unremarkable. There is increased attenuation seen within the gallbladder. Could represent gallbladder sludge and/or stones. The right kidney is unremarkable. There is a partially exophytic cystic mass at the superior pole of the left kidney. It is of low-attenuation. It measures approximately 2.8 cm. Probable cyst. No bladder calculi. Large and small bowel show no sign of obstruction. The appendix is not visualized. No pericecal stranding. No diverticulitis. There is some diastases of the anterior abdominal wall with associated small umbilical hernia containing mesenteric fat. No free air. No free fluid. The visualized abdominal aorta is of normal size and caliber. No significant retroperitoneal adenopathy. Visualized osseous structures show multilevel degenerative changes. There is a compression fracture which is shown interval progression of the L3 vertebra as compared to the prior lumbar exam July 31, 2019. There is a compression fracture superior endplate of L4. This appears to be new since prior examination. 1 INTERVAL PROGRESSION OF OLD COMPRESSION FRACTURE OF L3 AS COMPARED TO PRIOR STUDY. 2 NEW COMPRESSION FRACTURE OF THE L4 SUPERIOR ENDPLATES COMPARED TO PRIOR PLAIN FILM STUDY OF JULY 31, 2019. THERE IS INCREASED ATTENUATION SEEN WITHIN THE GALLBLADDER. COULD REPRESENT GALLBLADDER SLUDGE OR STONES. CORRELATE CLINICALLY. CONSIDER FOLLOW-UP ULTRASOUND TO FURTHER EVALUATE IF CLINICALLY INDICATED All CT scans at this facility use dose modulation, iterative reconstruction, and/or weight based dosing when appropriate to reduce radiation dose to as low as reasonably achievable. Xr Chest Portable    Result Date: 2/29/2020  EXAMINATION: XR CHEST PORTABLE CLINICAL HISTORY: SHORTNESS OF BREATH COMPARISONS: CHEST RADIOGRAPHS, FEBRUARY 20, 2020, FEBRUARY 19, 2020. CT CHEST, FEBRUARY 18, 2020 FINDINGS: Patient rotated to left. Osseous structures intact.  Cardiopericardial silhouette is normal. Aorta calcified. Ill-defined area of increased opacity right and left lung bases, as well as left midlung. BILATERAL SUBSEGMENTAL ATELECTASIS/PNEUMONIA. FINDINGS ESSENTIALLY UNCHANGED, GIVEN DIFFERENCES IN RADIOGRAPHIC TECHNIQUE. Xr Chest Portable    Result Date: 2/29/2020  EXAMINATION: XR CHEST PORTABLE CLINICAL HISTORY: SHORTNESS OF BREATH COMPARISONS: FEBRUARY 28, 2020 FINDINGS: Osseous structures intact. Cardiopericardial silhouette normal. Aorta calcified. Ill-defined areas increase opacity are found in the right lower and left midlung, unchanged from prior study. NO INTERVAL CHANGE. RIGHT LOWER AND LEFT MID LUNG ATELECTASIS/PNEUMONIA. Xr Chest Portable    Result Date: 2/20/2020  EXAMINATION: XR CHEST PORTABLE CLINICAL HISTORY:  vascular congestion and SOB COMPARISONS: Chest radiograph 2/19/2020, chest CT 2/18/2020 FINDINGS: Cardiac size is borderline. Pulmonary vascularity is normal. There are radiographic findings of COPD, concordant with recent chest CT. There are small ill-defined opacities in the lateral mid lungs and in the lung bases, concordant with infiltrates reported on recent chest CT. There are atherosclerotic changes of the thoracic aorta. There is no pneumothorax. There are no pleural effusions. There are degenerative changes in the spine. EMPHYSEMA. SMALL BILATERAL INFILTRATES. NO SIGNIFICANT CHANGE FROM PRIOR IMAGING STUDIES OF 2/18/2020 AND 2/19/2020. Xr Chest Portable    Result Date: 2/19/2020  EXAMINATION: CHEST PORTABLE VIEW  CLINICAL HISTORY: Short of breath COMPARISONS: February 4, 2020. FINDINGS: Single  views of the chest is submitted. The cardiac silhouette is enlarged. Pulmonary vascular attenuated. Lung fields are hyperinflated. Right sided trachea. Atelectasis, patchy groundglass infiltrate in the bases. Right greater than left. .  No Pneumothoraces.                                                                                    ATELECTASIS, PATCHY GROUNDGLASS INFILTRATE IN THE BASES. RIGHT GREATER THAN LEFT SUPERIMPOSED UPON COPD. [facility] Search PLEASE SEE CT SCAN CHEST REPORT TO FOLLOW FOR ADDITIONAL DETAILS      Xr Chest Portable    Result Date: 2/4/2020  EXAMINATION: XR CHEST PORTABLE CLINICAL HISTORY: SHORTNESS OF BREATH COMPARISONS: CHEST RADIOGRAPHS, JULY 31, 2019, JANUARY 2, 2019 FINDINGS: Osseous structures intact. Cardiopericardial silhouette is normal. Pulmonary vasculature is normal. Ill-defined area of increased opacity visualized laterally left mid, and left lower lung. Right lung clear. LEFT MID/LOWER LUNG ATELECTASIS/PNEUMONIA            IMPRESSION AND SUGGESTION:  Patient is at risk due to   · Influenza B pneumonia  · COPD exacerbation  · Volume overload  · Multiple lung nodules, needs outpatient work-up    Recommendation  · Complete 5 days of Tamiflu treatment  · Start prednisone taper  · 5 days of levofloxacin  · Watch volume status and avoid overload  · Continue current neb  · Pulmonary hygiene  · We will need outpatient PET scan and follow-up for pulmonary nodules, however patient might go to hospice.           Electronically signed by Eliecer Ellison MD,  FCCP   on 3/3/2020 at 9:09 AM

## 2020-03-03 NOTE — PROGRESS NOTES
[facility] AT [place] Respiratory Therapy Evaluation   Current Order:  xoPENIX TID       Home Regimen: no      Ordering Physician: lesley  Re-evaluation Date:  3/6     Diagnosis: atrial fib      Patient Status: Stable / Unstable + Physician notified    The following MDI Criteria must be met in order to convert aerosol to MDI with spacer. If unable to meet, MDI will be converted to aerosol:  []  Patient able to demonstrate the ability to use MDI effectively  []  Patient alert and cooperative  []  Patient able to take deep breath with 5-10 second hold  []  Medication(s) available in this delivery method   []  Peak flow greater than or equal to 200 ml/min            Current Order Substituted To  (same drug, same frequency)   Aerosol to MDI [] Albuterol Sulfate 0.083% unit dose by aerosol Albuterol Sulfate MDI 2 puffs by inhalation with spacer    [] Levalbuterol 1.25 mg unit dose by aerosol Levalbuterol MDI 2 puffs by inhalation with spacer    [] Levalbuterol 0.63 mg unit dose by aerosol Levalbuterol MDI 2 puffs by inhalation with spacer    [] Ipratropium Bromide 0.02% unit dose by aerosol Ipratropium Bromide MDI 2 puffs by inhalation with spacer    [] Duoneb (Ipratropium + Albuterol) unit dose by aerosol Ipratropium MDI + Albuterol MDI 2 puffs by inhalation w/spacer   MDI to Aerosol [] Albuterol Sulfate MDI Albuterol Sulfate 0.083% unit dose by aerosol    [] Levalbuterol MDI 2 puffs by inhalation Levalbuterol 1.25 mg unit dose by aerosol    [] Ipratropium Bromide MDI by inhalation Ipratropium Bromide 0.02% unit dose by aerosol    [] Combivent (Ipratropium + Albuterol) MDI by inhalation Duoneb (Ipratropium + Albuterol) unit dose by aerosol   Treatment Assessment [Frequency/Schedule]:  Change frequency to: ______xop tid ____________________________________________per Protocol, P&T, MEC      Points 0 1 2 3 4   Pulmonary Status  Non-Smoker  []   Smoking history   < 20 pack years  []   Smoking history  ?  20 pack years  []   Pulmonary Disorder  (acute or chronic)  [x]   Severe or Chronic w/ Exacerbation  []     Surgical Status No [x]   Surgeries     General []   Surgery Lower []   Abdominal Thoracic or []   Upper Abdominal Thoracic with  PulmonaryDisorder  []     Chest X-ray Clear/Not  Ordered     []  Chronic Changes  Results Pending  []  Infiltrates, atelectasis, pleural effusion, or edema  [x]  Infiltrates in more than one lobe []  Infiltrate + Atelectasis, &/or pleural effusion  []    Respiratory Pattern Regular,  RR = 12-20 [x]  Increased,  RR = 21-25 []  POWELL, irregular,  or RR = 26-30 []  Decreased FEV1  or RR = 31-35 []  Severe SOB, use  of accessory muscles, or RR ? 35  []    Mental Status Alert, oriented,  Cooperative [x]  Confused but Follows commands []  Lethargic or unable to follow commands []  Obtunded  []  Comatose  []    Breath Sounds Clear to  auscultation  []  Decreased unilaterally or  in bases only []  Decreased  bilaterally  [x]  Crackles or intermittent wheezes []  Wheezes []    Cough Strong, Spontan., & nonproductive [x]  Strong,  spontaneous, &  productive []  Weak,  Nonproductive []  Weak, productive or  with wheezes []  No spontaneous  cough or may require suctioning []    Level of Activity Ambulatory []  Ambulatory w/ Assist  []  Non-ambulatory [x]  Paraplegic []  Quadriplegic []    Total    Score:__9  _____     Triage Score:__4______      Tri       Triage:     1. (>20) Freq: Q3    2. (16-20) Freq: Q4   3. (11-15) Freq: QID & Albuterol Q2 PRN    4. (6-10) Freq: TID & Albuterol Q2 PRN    5. (0-5) Freq Q4prn

## 2020-03-04 PROCEDURE — 93010 ELECTROCARDIOGRAM REPORT: CPT | Performed by: INTERNAL MEDICINE

## 2020-04-21 ENCOUNTER — TELEPHONE (OUTPATIENT)
Dept: ADMINISTRATIVE | Age: 85
End: 2020-04-21